# Patient Record
Sex: FEMALE | Race: WHITE | NOT HISPANIC OR LATINO | Employment: FULL TIME | URBAN - METROPOLITAN AREA
[De-identification: names, ages, dates, MRNs, and addresses within clinical notes are randomized per-mention and may not be internally consistent; named-entity substitution may affect disease eponyms.]

---

## 2017-01-16 ENCOUNTER — ALLSCRIPTS OFFICE VISIT (OUTPATIENT)
Dept: OTHER | Facility: OTHER | Age: 52
End: 2017-01-16

## 2017-04-10 ENCOUNTER — ALLSCRIPTS OFFICE VISIT (OUTPATIENT)
Dept: OTHER | Facility: OTHER | Age: 52
End: 2017-04-10

## 2017-04-10 DIAGNOSIS — Z12.31 ENCOUNTER FOR SCREENING MAMMOGRAM FOR MALIGNANT NEOPLASM OF BREAST: ICD-10-CM

## 2017-07-20 ENCOUNTER — ALLSCRIPTS OFFICE VISIT (OUTPATIENT)
Dept: OTHER | Facility: OTHER | Age: 52
End: 2017-07-20

## 2017-10-25 ENCOUNTER — ALLSCRIPTS OFFICE VISIT (OUTPATIENT)
Dept: OTHER | Facility: OTHER | Age: 52
End: 2017-10-25

## 2017-10-25 DIAGNOSIS — Z13.1 ENCOUNTER FOR SCREENING FOR DIABETES MELLITUS: ICD-10-CM

## 2017-10-25 DIAGNOSIS — Z12.31 ENCOUNTER FOR SCREENING MAMMOGRAM FOR MALIGNANT NEOPLASM OF BREAST: ICD-10-CM

## 2017-10-25 DIAGNOSIS — M54.50 LOW BACK PAIN: ICD-10-CM

## 2017-10-25 DIAGNOSIS — Z13.220 ENCOUNTER FOR SCREENING FOR LIPOID DISORDERS: ICD-10-CM

## 2017-10-25 DIAGNOSIS — M19.90 OSTEOARTHRITIS: ICD-10-CM

## 2017-10-27 NOTE — PROGRESS NOTES
Assessment  1  Arthritis, degenerative (715 90) (M19 90)   2  Chronic low back pain (724 2,338 29) (M54 5,G89 29)   3  Screening, lipid (V77 91) (Z13 220)   4  Encounter for screening examination for impaired glucose regulation and diabetes mellitus (V77 1)   (Z13 1)   5  Encounter for screening mammogram for breast cancer (V76 12) (Z12 31)   6  Encounter for screening colonoscopy (V76 51) (Z12 11)    Plan  Arthritis, degenerative, Chronic low back pain, Encounter for screening examination for impaired  glucose regulation and diabetes mellitus, Screening, lipid    · (1) CBC/PLT/DIFF; Status:Active; Requested LIDYA:[id number];    · (1) COMPREHENSIVE METABOLIC PANEL; Status:Active; Requested [id number];   Chronic low back pain    · Hydrocodone-Acetaminophen 7 5-325 MG Oral Tablet; TAKE 1 TABLET EVERY 4 HOURS AS  NEEDED  Encounter for screening colonoscopy    · COLONOSCOPY; Status:Active; Requested OQM:[id number];    · 1 - Philippe SAN, Elidia Wilkerson  (Gastroenterology) Co-Management  *  Status: Active  Requested for:  [id number]  Care Summary provided  : Yes  Encounter for screening mammogram for breast cancer    · * MAMMO SCREENING BILATERAL W CAD; Status:Hold For - Scheduling; Requested for:2017;   PMH: History of screening mammography    · * MAMMO SCREENING BILATERAL W CAD; Status:Active; Requested GCR:[id number];   PMH: Screening for malignant neoplasm of colon    · COLONOSCOPY; Status:Active; Requested CRYSTAL:[id number];   Screening, lipid    · (1) LIPID PANEL, FASTING; Status:Active; Requested IFJ:[id number];     Discussion/Summary  Discussion Summary:   Discussed importance of continued daily activity, continue low fat low cholesterol diet  GO for mammogram  Go for colonoscopy  Patient will consider in future as she is trying to get insurance coverage  Commended on stopping smoking   Discussed low cost lab alternatives including Call [address] [place] for cost adjusted options for those without insurance or local lab [facility] [phone]  Continue with regular PCP follow up  Refill given  Counseling Documentation With Imm: The patient was counseled regarding diagnostic results,-- instructions for management,-- risk factor reductions,-- prognosis,-- patient and family education,-- impressions,-- risks and benefits of treatment options,-- importance of compliance with treatment  Medication SE Review and Pt Understands Tx: Possible side effects of new medications were reviewed with the patient/guardian today  The treatment plan was reviewed with the patient/guardian  The patient/guardian understands and agrees with the treatment plan      Chief Complaint  Chief Complaint Free Text Note Form: PT here for 3 month follow up and medication refills  PT stated no complaints is self pay      History of Present Illness  HPI: 45 yo female notes she stopped smoking  Had been trying to do so for a while  HAs not had labs  Notes she was motivated to stop smoking due to the cosmetics effects of it  No recent imaging  Has not had colonoscopy, pap  Pain stable  Chronic but notes it is livable  NO change in bowel or bladder fxn  Worse with weather changes  Looking for new job, does not have insurance  Chronic Pain (Follow-Up): The patient is being seen for follow-up of chronic back pain  The patient reports no change in the condition  Interval symptoms:  stable back pain,-- denies abdominal pain-- and-- stable lower extremity pain  Tobacco Cessation Pathway: The patient is being seen for Stopped smoking this past week  Doing well  NO complaints at this time  Feels good  and clinic follow-up for tobacco cessation assistance  Review of Systems  Complete-Female:   Constitutional: no fever  ENT: chorinc horase voice per patient unchanged  Cardiovascular: no chest pain-- and-- no palpitations  Respiratory: no shortness of breath-- and-- no cough  Gastrointestinal: No prior colonoscopy  , but-- no abdominal pain,-- no nausea,-- no vomiting-- and-- no diarrhea  Active Problems  1  Advance directive in chart (V49 89) (Z78 9)   2  Arthritis, degenerative (715 90) (M19 90)   3  Chronic low back pain (724 2,338 29) (M54 5,G89 29)   4  Vitamin D deficiency (268 9) (E55 9)    Past Medical History  1  History of Acute suppurative otitis media of right ear without spontaneous rupture of tympanic   membrane, recurrence not specified (382 00) (H66 001)   2  History of Carrier or suspected carrier of other venereal diseases (V02 8) (Z22 4)   3  History of Chronic low back pain (724 2,338 29) (M54 5,G89 29)   4  History of Eustachian tube dysfunction (381 81) (H69 80)   5  History of osteoarthritis (V13 4) (Z87 39)   6  History of Pain in joint of left hip (719 45) (M25 552)   7  History of Right ankle sprain (845 00) (S93 401A)   8  History of Rotator cuff tendinitis (726 10) (M75 80)   9  History of Stress reaction (308 9) (F43 0)   10  History of Viral upper respiratory infection (465 9) (J06 9,B97 89)  Active Problems And Past Medical History Reviewed: The active problems and past medical history were reviewed and updated today  Surgical History  1  History of Dental Surgery   2  History of Tonsillectomy With Adenoidectomy   3  History of Tubal Ligation  Surgical History Reviewed: The surgical history was reviewed and updated today  Family History  Mother    1  Family history of Benign essential hypertension   2  History of stroke  Father    3  Family history of Benign essential hypertension  Grandparent    4  Family history of malignant neoplasm of stomach (V16 0) (Z80 0)  Maternal Grandfather    5  History of cardiac disorder  Family History    6  Family history of Type 2 Diabetes Mellitus  Family History Reviewed: The family history was reviewed and updated today         Social History   · Advance directive in chart (V49 89) (Z78 9)   · Caffeine Use   · Former smoker (V15 82) (Y09 773)   · No illicit drug use   · Rarely consumes alcohol (V49 89) (Z78 9)  Social History Reviewed: The social history was reviewed and updated today  Current Meds   1  Fish Oil 1200 MG Oral Capsule; TAKE AS DIRECTED; Therapy: (Recorded:24Oct2017) to Recorded   2  Hydrocodone-Acetaminophen 7 5-325 MG Oral Tablet; TAKE 1 TABLET EVERY 4 HOURS AS   NEEDED; Last Rx:[id number] Ordered   3  Multivitamins TABS; TAKE 1 TABLET DAILY; Therapy: (SYLWIA:55JTG3423) to Recorded   4  Vitamin B-12 TABS; TAKE 1 TABLET DAILY; Therapy: (JIZJTQWU:57GFJ2985) to Recorded  Medication List Reviewed: The medication list was reviewed and updated today  Allergies  1  Codeine Derivatives  2  No Known Environmental Allergies   3  No Known Food Allergies    Vitals  Vital Signs    Recorded: 13AUT7426 10:48AM   Temperature 98 1 F, Oral   Heart Rate 58   Systolic 203, LUE, Sitting   Diastolic 60, LUE, Sitting   Height 5 ft 5 in   Weight 113 lb 4 oz   BMI Calculated 18 85   BSA Calculated 1 55   O2 Saturation 98, RA     Physical Exam    Constitutional   General appearance: No acute distress, well appearing and well nourished  -- Alert, thing build, seated in room in NAD  Eyes   Conjunctiva and lids: No swelling, erythema or discharge  Ears, Nose, Mouth, and Throat   Oropharynx: Normal with no erythema, edema, exudate or lesions  -- MMM, normal pharynx  Pulmonary   Respiratory effort: No increased work of breathing or signs of respiratory distress  -- CTA throughout  No crackles, no wheeze  Auscultation of lungs: Clear to auscultation  Cardiovascular   Auscultation of heart: Normal rate and rhythm, normal S1 and S2, without murmurs  -- RRR -- No LE edema  Abdomen soft  Musculoskeletal + LS spine paraspinus muscle TTP  Neurologic No gross focal neuro deficits on exam    Psychiatric   Mood and affect: Normal          Health Management  History of Breast cancer screening   Digital Bilateral Screening Mammogram With CAD; every 1 year; Last 30LJI4545;  Next Due:  57AXT4034; Overdue  History of Need for influenza vaccination   Fluzone everyuadrivalent Intramuscular Suspension; every 1 year; Next Due: 98UWY5896; Overdue  History of Screening for depression   PHevery-2 Adult Depression Screening; every 1 year; Last 78IPL7234; Next Due: 74WWH8825; Active  PHevery-9 Adult Depression Screening; every 1 year; Last 14Qsw6466; Next Due: 83SRK1955;   Overdue    Future Appointments    Date/Time Provider Specialty Site   01/25/2018 04:30 PM Shelley Chakraborty MD Internal Medicine [facility]     Signatures   Electronically signed by : Pj Oconnor [facility]; Oct 25 2017 12:06PM EST                       (Author)    Electronically signed by : Isaias Pallas, MD; Oct 26 2017  5:35PM EST

## 2018-01-13 VITALS
OXYGEN SATURATION: 98 % | TEMPERATURE: 98.1 F | HEART RATE: 58 BPM | WEIGHT: 113.25 LBS | SYSTOLIC BLOOD PRESSURE: 108 MMHG | DIASTOLIC BLOOD PRESSURE: 60 MMHG | BODY MASS INDEX: 18.87 KG/M2 | HEIGHT: 65 IN

## 2018-01-14 VITALS
SYSTOLIC BLOOD PRESSURE: 102 MMHG | TEMPERATURE: 98.1 F | HEART RATE: 58 BPM | OXYGEN SATURATION: 98 % | BODY MASS INDEX: 19.16 KG/M2 | DIASTOLIC BLOOD PRESSURE: 58 MMHG | WEIGHT: 115 LBS | HEIGHT: 65 IN

## 2018-01-15 VITALS
TEMPERATURE: 98.4 F | DIASTOLIC BLOOD PRESSURE: 62 MMHG | HEIGHT: 65 IN | OXYGEN SATURATION: 97 % | SYSTOLIC BLOOD PRESSURE: 102 MMHG | WEIGHT: 108 LBS | HEART RATE: 74 BPM | BODY MASS INDEX: 17.99 KG/M2

## 2018-01-15 VITALS
TEMPERATURE: 99.1 F | DIASTOLIC BLOOD PRESSURE: 72 MMHG | HEIGHT: 65 IN | WEIGHT: 108.5 LBS | BODY MASS INDEX: 18.08 KG/M2 | SYSTOLIC BLOOD PRESSURE: 117 MMHG | OXYGEN SATURATION: 99 % | HEART RATE: 55 BPM

## 2018-01-15 NOTE — PROGRESS NOTES
Assessment    1  Viral upper respiratory infection (465 9) (J06 9)    Plan  Viral upper respiratory infection    · Chlorpheniramine Maleate 4 MG Oral Tablet; 1 tablet po q8h prn nasal congestion   · Nasacort Allergy 24HR 55 MCG/ACT Nasal Aerosol; 2 sprays each nostril daily    Discussion/Summary    Patient's symptoms are most consistent with viral upper respiratory infection  I believe the rash is secondary to her coughing rather than a true exanthem  Treatment at this point will be supportive with use of chlorpheniramine and over-the-counter and nasal steroids  Her cough is not severe enough where it wants a cough suppressant  Patient states that Mucinex is sufficient for cough control  She's instructed to contact the office should she develop any fever or worsening symptoms  Chief Complaint    1  Cold Symptoms  Patient is here c/o cough, pain on ears, congestion behind ears, shortness of breath, chills, symptoms are going on for three days, and they are getting worse  History of Present Illness  HPI: The patient presents with a three-day history of sinus congestion, ear congestion, feeling of fullness behind the ears, and generalized malaise associated with some moderate increase in chronic low back pain  She denies any fevers or chills  She's been taking DayQuil and Mucinex with minimal relief of symptoms  She also notes the appearance of a faint erythematous rash in the upper chest        Cold Symptoms:   Associated symptoms include nasal congestion, runny nose, post nasal drainage, scratchy throat, productive cough, plugged ear(s), ear pain and fatigue, but no sore throat, no hoarseness, no dry cough, no facial pressure, no facial pain, no headache, no swollen lymph nodes, no wheezing, no shortness of breath, no weakness, no nausea, no vomiting, no diarrhea, no fever and no chills  Review of Systems    Constitutional: as noted in HPI    ENT: as noted in HPI     Cardiovascular: no complaints of slow or fast heart rate, no chest pain, no palpitations, no leg claudication or lower extremity edema  Respiratory: as noted in HPI  Gastrointestinal: no complaints of abdominal pain, no constipation, no nausea or diarrhea, no vomiting, no bloody stools  Genitourinary: no complaints of dysuria, no incontinence, no pelvic pain, no dysmenorrhea, no vaginal discharge or abnormal vaginal bleeding  Musculoskeletal: myalgias  Integumentary: rash  Active Problems    1  Arthritis, degenerative (715 90) (M19 90)   2  Chronic low back pain (724 2,338 29) (M54 5,G89 29)   3  Vitamin D deficiency (268 9) (E55 9)    Past Medical History  Active Problems And Past Medical History Reviewed: The active problems and past medical history were reviewed and updated today  Social History    · Being A Social Drinker   · Caffeine Use   · Current Every Day Smoker (305 1)   · Former smoker (V15 82) (M05 262)   · Marital History -  (V61 03)   · 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  The social history was reviewed and is unchanged  Current Meds   1  Antioxidant TABS; TAKE 1 TABLET DAILY; Therapy: (Recorded:58Xfe0512) to Recorded   2  Fish Oil 1200 MG Oral Capsule; Therapy: ([place]) to Recorded   3  Hydrocodone-Acetaminophen 7 5-325 MG Oral Tablet; TAKE 1 TABLET EVERY 6 HOURS   AS NEEDED; Last Rx:[id number] Ordered   4  Multivitamins TABS; TAKE 1 TABLET DAILY; Therapy: ([place]) to Recorded   5  Vitamin B-12 TABS; TAKE 1 TABLET DAILY; Therapy: ([place]) to Recorded   6  Vitamin D3 5000 UNIT Oral Tablet; Take 1 tablet daily; Therapy: (Recorded:50Gcf5980) to Recorded    The medication list was reviewed and updated today  Allergies    1   Codeine Derivatives    Vitals   Recorded: 77DZU6810 10:00AM   Temperature 98 7 F, Oral   Heart Rate 76   Systolic 98, LUE, Sitting   Diastolic 70, LUE, Sitting   Height 5 ft 4 5 in Weight 116 lb 4 oz   BMI Calculated 19 65   BSA Calculated 1 56   O2 Saturation 96     Physical Exam    Constitutional   General appearance: No acute distress, well appearing and well nourished  acutely ill, uncomfortable, normal body odor, underweight, clothing appropriate, well groomed, well hydrated and appearance reflects stated age  Eyes   Conjunctiva and lids: No swelling, erythema or discharge  Pupils and irises: Equal, round and reactive to light  Ears, Nose, Mouth, and Throat   External inspection of ears and nose: Normal     Otoscopic examination: Tympanic membranes translucent with normal light reflex  Canals patent without erythema  Nasal mucosa, septum, and turbinates: Normal without edema or erythema  Oropharynx: Normal with no erythema, edema, exudate or lesions  Pulmonary   Respiratory effort: No increased work of breathing or signs of respiratory distress  Auscultation of lungs: Clear to auscultation  Cardiovascular   Auscultation of heart: Normal rate and rhythm, normal S1 and S2, without murmurs  Examination of extremities for edema and/or varicosities: Normal     Musculoskeletal   Gait and station: Normal     Skin A mild erythematous rash consisting of small telangiectatic vessels is noted the mid and upper chest  It is nonpruritic  Neurologic Grossly no focal deficits     Psychiatric   Orientation to person, place, and time: Normal     Mood and affect: Normal          Future Appointments    Date/Time Provider Specialty Site   03/31/2016 04:30 PM Aaliyah Reza MD Internal Medicine [facility]     Signatures   Electronically signed by : Brandon Kyle MD; Lamberto 15 2016 10:31AM EST                       (Author)

## 2018-01-25 ENCOUNTER — OFFICE VISIT (OUTPATIENT)
Dept: INTERNAL MEDICINE CLINIC | Facility: CLINIC | Age: 53
End: 2018-01-25

## 2018-01-25 VITALS
WEIGHT: 111.8 LBS | OXYGEN SATURATION: 98 % | BODY MASS INDEX: 18.63 KG/M2 | HEIGHT: 65 IN | TEMPERATURE: 98.4 F | DIASTOLIC BLOOD PRESSURE: 60 MMHG | SYSTOLIC BLOOD PRESSURE: 108 MMHG | HEART RATE: 64 BPM

## 2018-01-25 DIAGNOSIS — M54.50 CHRONIC MIDLINE LOW BACK PAIN WITHOUT SCIATICA: Primary | ICD-10-CM

## 2018-01-25 DIAGNOSIS — G89.29 CHRONIC MIDLINE LOW BACK PAIN WITHOUT SCIATICA: Primary | ICD-10-CM

## 2018-01-25 PROCEDURE — 99213 OFFICE O/P EST LOW 20 MIN: CPT | Performed by: INTERNAL MEDICINE

## 2018-01-25 RX ORDER — HYDROCODONE BITARTRATE AND ACETAMINOPHEN 7.5; 325 MG/1; MG/1
1 TABLET ORAL EVERY 4 HOURS PRN
COMMUNITY
End: 2018-01-25 | Stop reason: SDUPTHER

## 2018-01-25 RX ORDER — HYDROCODONE BITARTRATE AND ACETAMINOPHEN 7.5; 325 MG/1; MG/1
1 TABLET ORAL EVERY 4 HOURS PRN
Qty: 90 TABLET | Refills: 0 | Status: SHIPPED | OUTPATIENT
Start: 2018-01-25 | End: 2018-02-24

## 2018-01-25 RX ORDER — UBIDECARENONE 75 MG
1 CAPSULE ORAL DAILY
COMMUNITY
End: 2018-01-25 | Stop reason: SDDI

## 2018-01-25 RX ORDER — AMOXICILLIN 500 MG
1200 CAPSULE ORAL DAILY
COMMUNITY
End: 2021-01-25 | Stop reason: ALTCHOICE

## 2018-01-25 RX ORDER — PNV NO.121/IRON/FOLIC ACID 28MG-0.8MG
1 TABLET ORAL DAILY
COMMUNITY
End: 2018-04-26

## 2018-01-25 NOTE — PATIENT INSTRUCTIONS
Pain medication was renewed  The patient is in between jobs at the present time and does not have health insurance but once she is successful in obtaining gainful employment she will proceed with the previously recommended testing

## 2018-01-25 NOTE — PROGRESS NOTES
Assessment/Plan:    No problem-specific Assessment & Plan notes found for this encounter  Problem List Items Addressed This Visit        Other    Chronic low back pain - Primary    Relevant Medications    HYDROcodone-acetaminophen (NORCO) 7 5-325 mg per tablet            Subjective:      Patient ID: Radha Duggan is a 45 y o  female  The patient presents to the office for a follow-up visit relating to chronic low back pain  She relates that she has discontinued smoking  It has been almost 2 weeks since her last cigarette and she is feeling fairly comfortable  Additionally, she notes that her back pain seems to be less since she has stopped smoking  She has not had any significant cravings  She has a little bit concerned after losing 1/4 of an inch in height upon check and in the office today  The following portions of the patient's history were reviewed and updated as appropriate: allergies, current medications, past social history and problem list     Review of Systems   Constitutional: Negative for activity change and appetite change  HENT: Negative  Eyes: Negative  Respiratory: Negative  Cardiovascular: Negative  Gastrointestinal: Negative  Genitourinary: Negative  Musculoskeletal: Positive for back pain (Without sciatica and somewhat improved after smoking cessation)  Neurological: Negative  Psychiatric/Behavioral: Negative  Objective:     Physical Exam   Constitutional: She is oriented to person, place, and time  She appears well-developed and well-nourished  HENT:   Head: Normocephalic and atraumatic  Eyes: Conjunctivae and EOM are normal  Pupils are equal, round, and reactive to light  No scleral icterus  Neck: Normal range of motion  Neck supple  No JVD present  Cardiovascular: Normal rate and regular rhythm  Exam reveals no gallop  No murmur heard  Pulmonary/Chest: Effort normal and breath sounds normal    Abdominal: Soft   She exhibits no distension  Musculoskeletal: Normal range of motion  Neurological: She is alert and oriented to person, place, and time  Skin: Skin is warm and dry  Psychiatric: She has a normal mood and affect

## 2018-03-22 ENCOUNTER — TELEPHONE (OUTPATIENT)
Dept: INTERNAL MEDICINE CLINIC | Facility: CLINIC | Age: 53
End: 2018-03-22

## 2018-03-22 NOTE — TELEPHONE ENCOUNTER
Noted  Will refill when Dr Smith Sanford is in [address] [facility] office tomorrow

## 2018-03-22 NOTE — TELEPHONE ENCOUNTER
Jemima called and stated that she needed her Hydrocodone refilled, the last time she was in she stated that the quantity of the medication was wrong, it is supposed to be a quantity of 120  I sent her to the prescription refill line but I also wanted to document it to make you aware that is what she wants  Thank you

## 2018-03-23 DIAGNOSIS — M54.50 CHRONIC BILATERAL LOW BACK PAIN WITHOUT SCIATICA: Primary | ICD-10-CM

## 2018-03-23 DIAGNOSIS — G89.29 CHRONIC BILATERAL LOW BACK PAIN WITHOUT SCIATICA: Primary | ICD-10-CM

## 2018-03-23 RX ORDER — HYDROCODONE BITARTRATE AND ACETAMINOPHEN 7.5; 325 MG/1; MG/1
1 TABLET ORAL EVERY 4 HOURS PRN
Qty: 120 TABLET | Refills: 0 | Status: SHIPPED | OUTPATIENT
Start: 2018-03-23 | End: 2018-04-26 | Stop reason: SDUPTHER

## 2018-04-26 ENCOUNTER — OFFICE VISIT (OUTPATIENT)
Dept: INTERNAL MEDICINE CLINIC | Facility: CLINIC | Age: 53
End: 2018-04-26

## 2018-04-26 VITALS
WEIGHT: 121.4 LBS | HEIGHT: 65 IN | BODY MASS INDEX: 20.23 KG/M2 | HEART RATE: 61 BPM | TEMPERATURE: 99.1 F | DIASTOLIC BLOOD PRESSURE: 68 MMHG | OXYGEN SATURATION: 97 % | SYSTOLIC BLOOD PRESSURE: 120 MMHG

## 2018-04-26 DIAGNOSIS — G89.29 CHRONIC BILATERAL LOW BACK PAIN WITHOUT SCIATICA: ICD-10-CM

## 2018-04-26 DIAGNOSIS — M47.817 SPONDYLOSIS OF LUMBOSACRAL REGION WITHOUT MYELOPATHY OR RADICULOPATHY: Primary | ICD-10-CM

## 2018-04-26 DIAGNOSIS — M54.50 CHRONIC BILATERAL LOW BACK PAIN WITHOUT SCIATICA: ICD-10-CM

## 2018-04-26 DIAGNOSIS — K59.09 OTHER CONSTIPATION: ICD-10-CM

## 2018-04-26 PROCEDURE — 99212 OFFICE O/P EST SF 10 MIN: CPT | Performed by: INTERNAL MEDICINE

## 2018-04-26 RX ORDER — DIPHENOXYLATE HYDROCHLORIDE AND ATROPINE SULFATE 2.5; .025 MG/1; MG/1
1 TABLET ORAL DAILY
COMMUNITY

## 2018-04-26 RX ORDER — HYDROCODONE BITARTRATE AND ACETAMINOPHEN 7.5; 325 MG/1; MG/1
1 TABLET ORAL EVERY 4 HOURS PRN
Qty: 120 TABLET | Refills: 0 | Status: SHIPPED | OUTPATIENT
Start: 2018-04-26 | End: 2018-06-18 | Stop reason: SDUPTHER

## 2018-04-26 NOTE — PROGRESS NOTES
Assessment/Plan:    Arthritis, degenerative  Improved with the use of probiotics  Most likely due to combination of smoking cessation and use of hydrocodone       Diagnoses and all orders for this visit:    Spondylosis of lumbosacral region without myelopathy or radiculopathy    Chronic bilateral low back pain without sciatica  -     HYDROcodone-acetaminophen (NORCO) 7 5-325 mg per tablet; Take 1 tablet by mouth every 4 (four) hours as needed for pain Earliest Fill Date: 4/26/18 Max Daily Amount: 6 tablets    Other constipation    Other orders  -     multivitamin (THERAGRAN) TABS; Take 1 tablet by mouth daily  -     Probiotic PACK; Take by mouth          Subjective:      Patient ID: Tad Beauchamp is a 45 y o  female  Patient presents for follow-up visit for chronic low back pain  She has managed to stop smoking approximately 2 months ago  In the interim she has gained 10 lb of weight but she states that she feels better  She is a little upset over the fact that she now has a small protuberant belly  She has begun doing some toning exercises  Her back pain is somewhat improved  She finds that on some days she home it is taking her hydrocodone tablet for the 3rd dose in the evening because she does not have severe enough back pain  Appetite remains good  She did develop some constipation after stopping smoking but this improved with the use of probiotics  Family History   Problem Relation Age of Onset    Hypertension Mother      Benign Essential    Stroke Mother     Hypertension Father      Benign Essential    Other Maternal Grandfather      Cardiac disorder    Other Other      Malignant neoplasm of stomach    Diabetes type II Other      Social History     Social History    Marital status:      Spouse name: N/A    Number of children: N/A    Years of education: N/A     Occupational History    Not on file       Social History Main Topics    Smoking status: Former Smoker     Quit date: 1/12/2018    Smokeless tobacco: Never Used    Alcohol use Yes      Comment: Rarely consumes alcohol    Drug use: No    Sexual activity: Not on file     Other Topics Concern    Not on file     Social History Narrative    Advance directive in chart    Caffeine Use         Past Medical History:   Diagnosis Date   Georgina Sheaprd Carrier of disease     Carrier or suspected carrier of other venereal diseases: Hepatitis B (Previous exposure, cannot donate blood)    Chronic low back pain     Eustachian tube dysfunction     Rotator cuff tendinitis        Current Outpatient Prescriptions:     calcium carbonate-vitamin D (OSCAL-D) 500 mg-200 units per tablet, Take 1 tablet by mouth daily, Disp: , Rfl:     HYDROcodone-acetaminophen (NORCO) 7 5-325 mg per tablet, Take 1 tablet by mouth every 4 (four) hours as needed for pain Earliest Fill Date: 4/26/18 Max Daily Amount: 6 tablets, Disp: 120 tablet, Rfl: 0    multivitamin (THERAGRAN) TABS, Take 1 tablet by mouth daily, Disp: , Rfl:     Omega-3 Fatty Acids (FISH OIL) 1200 MG CAPS, Take by mouth daily, Disp: , Rfl:     Probiotic PACK, Take by mouth, Disp: , Rfl:   Allergies   Allergen Reactions    Codeine Nausea Only and Vomiting     Past Surgical History:   Procedure Laterality Date    DENTAL SURGERY      Impacted wisdom teeth    TONSILECTOMY AND ADNOIDECTOMY      TUBAL LIGATION Bilateral          Review of Systems   Constitutional: Positive for unexpected weight change (Beneficial weight gain after smoking cessation)  HENT: Negative  Eyes: Negative  Respiratory: Negative  Cardiovascular: Negative  Gastrointestinal: Negative  Endocrine: Negative  Genitourinary: Negative  Musculoskeletal: Positive for back pain (Chronic low back pain which is mildly improved)  Skin: Negative  Neurological: Negative  Hematological: Negative            Objective:      /68 (BP Location: Left arm, Patient Position: Sitting, Cuff Size: Standard)   Pulse 61 Temp 99 1 °F (37 3 °C) (Oral)   Ht 5' 5" (1 651 m)   Wt 55 1 kg (121 lb 6 4 oz)   SpO2 97%   BMI 20 20 kg/m²          Physical Exam   Constitutional: She is oriented to person, place, and time  She appears well-developed and well-nourished  HENT:   Head: Normocephalic and atraumatic  Eyes: EOM are normal  Pupils are equal, round, and reactive to light  Neck: Neck supple  No JVD present  No tracheal deviation present  Cardiovascular: Normal rate, regular rhythm, normal heart sounds and intact distal pulses  Pulmonary/Chest: Effort normal and breath sounds normal  She has no rales  Abdominal: Soft  She exhibits no distension  There is no tenderness  Musculoskeletal: Normal range of motion  She exhibits no edema or deformity  Lymphadenopathy:     She has no cervical adenopathy  Neurological: She is alert and oriented to person, place, and time  Skin: Skin is warm and dry  Psychiatric: She has a normal mood and affect   Thought content normal

## 2018-04-26 NOTE — ASSESSMENT & PLAN NOTE
Improved with the use of probiotics    Most likely due to combination of smoking cessation and use of hydrocodone

## 2018-04-26 NOTE — PATIENT INSTRUCTIONS
Hydrocodone was renewed  The patient is still not covered by any type of health insurance but hopefully with resumption of gainful employment at [address] [place] is a benefit she will be able to once again have some follow-up labs and needed studies done

## 2018-06-18 DIAGNOSIS — G89.29 CHRONIC BILATERAL LOW BACK PAIN WITHOUT SCIATICA: ICD-10-CM

## 2018-06-18 DIAGNOSIS — M54.50 CHRONIC BILATERAL LOW BACK PAIN WITHOUT SCIATICA: ICD-10-CM

## 2018-06-18 RX ORDER — HYDROCODONE BITARTRATE AND ACETAMINOPHEN 7.5; 325 MG/1; MG/1
1 TABLET ORAL EVERY 4 HOURS PRN
Qty: 120 TABLET | Refills: 0 | Status: SHIPPED | OUTPATIENT
Start: 2018-06-18 | End: 2018-07-26 | Stop reason: SDUPTHER

## 2018-07-26 DIAGNOSIS — M54.50 CHRONIC BILATERAL LOW BACK PAIN WITHOUT SCIATICA: ICD-10-CM

## 2018-07-26 DIAGNOSIS — G89.29 CHRONIC BILATERAL LOW BACK PAIN WITHOUT SCIATICA: ICD-10-CM

## 2018-07-27 RX ORDER — HYDROCODONE BITARTRATE AND ACETAMINOPHEN 7.5; 325 MG/1; MG/1
1 TABLET ORAL EVERY 4 HOURS PRN
Qty: 120 TABLET | Refills: 0 | Status: SHIPPED | OUTPATIENT
Start: 2018-07-27 | End: 2018-09-06 | Stop reason: SDUPTHER

## 2018-08-15 ENCOUNTER — OFFICE VISIT (OUTPATIENT)
Dept: INTERNAL MEDICINE CLINIC | Facility: CLINIC | Age: 53
End: 2018-08-15

## 2018-08-15 VITALS
HEIGHT: 65 IN | OXYGEN SATURATION: 97 % | BODY MASS INDEX: 20.76 KG/M2 | DIASTOLIC BLOOD PRESSURE: 52 MMHG | TEMPERATURE: 97.5 F | WEIGHT: 124.6 LBS | SYSTOLIC BLOOD PRESSURE: 102 MMHG | HEART RATE: 55 BPM

## 2018-08-15 DIAGNOSIS — M47.817 SPONDYLOSIS OF LUMBOSACRAL REGION WITHOUT MYELOPATHY OR RADICULOPATHY: ICD-10-CM

## 2018-08-15 DIAGNOSIS — M54.50 CHRONIC MIDLINE LOW BACK PAIN WITHOUT SCIATICA: Primary | ICD-10-CM

## 2018-08-15 DIAGNOSIS — G89.29 CHRONIC MIDLINE LOW BACK PAIN WITHOUT SCIATICA: Primary | ICD-10-CM

## 2018-08-15 DIAGNOSIS — M70.61 TROCHANTERIC BURSITIS OF RIGHT HIP: ICD-10-CM

## 2018-08-15 PROCEDURE — 99213 OFFICE O/P EST LOW 20 MIN: CPT | Performed by: INTERNAL MEDICINE

## 2018-08-15 NOTE — ASSESSMENT & PLAN NOTE
No change to current medications    We will continue with as needed hydrocodone and the addition of homeopathic treatments as noted

## 2018-08-15 NOTE — ASSESSMENT & PLAN NOTE
Will initiate some homeopathic remedies for the trochanteric bursitis with arnica cream and tablets

## 2018-08-15 NOTE — PATIENT INSTRUCTIONS
The patient did not want to consider any inter trochanteric steroid injections at this time  She will trial with over-the-counter anti-inflammatory medications and homeopathic remedies such as arnica [place]  She anticipates getting a job at [employer] at the end of the month at which time she will have healthcare insurance and hopefully can begin some diagnostics

## 2018-08-15 NOTE — ASSESSMENT & PLAN NOTE
The pain is controlled with current medications  We had a discussion about the addition of Celebrex and/or Cymbalta as an adjunctive therapy  We will defer for now

## 2018-08-15 NOTE — PROGRESS NOTES
Assessment/Plan:    Chronic low back pain  The pain is controlled with current medications  We had a discussion about the addition of Celebrex and/or Cymbalta as an adjunctive therapy  We will defer for now  Trochanteric bursitis of right hip    Will initiate some homeopathic remedies for the trochanteric bursitis with arnica cream and tablets  Arthritis, degenerative    No change to current medications  We will continue with as needed hydrocodone and the addition of homeopathic treatments as noted       Diagnoses and all orders for this visit:    Chronic midline low back pain without sciatica    Spondylosis of lumbosacral region without myelopathy or radiculopathy    Trochanteric bursitis of right hip          Subjective:      Patient ID: Hiral Ren is a 45 y o  female  Patient presents for 3 month follow-up for chronic low back pain  She is currently doing well  She is only requiring to take water to doses of her pain medication per day she has a new problem today which is that of some right hip discomfort  She is concerned that she could have some arthritis in her hip  The pain bothers her somewhat going up and downstairs but for the most part is just precipitated by walking and is aggravated when she tries to sleep on that side  She has had no fevers or chills  Family History   Problem Relation Age of Onset    Hypertension Mother         Benign Essential    Stroke Mother     Hypertension Father         Benign Essential    Other Maternal Grandfather         Cardiac disorder    Other Other         Malignant neoplasm of stomach    Diabetes type II Other      Social History     Social History    Marital status:      Spouse name: N/A    Number of children: N/A    Years of education: N/A     Occupational History    Not on file       Social History Main Topics    Smoking status: Former Smoker     Quit date: 1/12/2018    Smokeless tobacco: Never Used    Alcohol use Yes Comment: Rarely consumes alcohol    Drug use: No    Sexual activity: Not on file     Other Topics Concern    Not on file     Social History Narrative    Advance directive in chart    Caffeine Use         Past Medical History:   Diagnosis Date   [facility] Carrier of disease     Carrier or suspected carrier of other venereal diseases: Hepatitis B (Previous exposure, cannot donate blood)    Chronic low back pain     Eustachian tube dysfunction     Rotator cuff tendinitis        Current Outpatient Prescriptions:     calcium carbonate-vitamin D (OSCAL-D) 500 mg-200 units per tablet, Take 1 tablet by mouth daily, Disp: , Rfl:     HYDROcodone-acetaminophen (NORCO) 7 5-325 mg per tablet, Take 1 tablet by mouth every 4 (four) hours as needed for pain Max Daily Amount: 6 tablets, Disp: 120 tablet, Rfl: 0    multivitamin (THERAGRAN) TABS, Take 1 tablet by mouth daily, Disp: , Rfl:     Probiotic PACK, Take by mouth, Disp: , Rfl:     Omega-3 Fatty Acids (FISH OIL) 1200 MG CAPS, Take by mouth daily, Disp: , Rfl:   Allergies   Allergen Reactions    Codeine Nausea Only and Vomiting     Past Surgical History:   Procedure Laterality Date    DENTAL SURGERY      Impacted wisdom teeth    TONSILECTOMY AND ADNOIDECTOMY      TUBAL LIGATION Bilateral          Review of Systems   Constitutional: Negative  HENT: Negative  Eyes: Negative  Respiratory: Negative  Cardiovascular: Negative  Gastrointestinal: Negative  Endocrine: Negative  Genitourinary: Negative  Musculoskeletal: Positive for arthralgias (Right hip discomfort)  Neurological: Negative  Hematological: Negative  Objective:      /52 (BP Location: Left arm, Patient Position: Sitting, Cuff Size: Adult)   Pulse 55   Temp 97 5 °F (36 4 °C) (Oral)   Ht 5' 4 76" (1 645 m)   Wt 56 5 kg (124 lb 9 6 oz)   SpO2 97%   BMI 20 89 kg/m²          Physical Exam   Constitutional: She is oriented to person, place, and time   She appears well-developed and well-nourished  HENT:   Head: Normocephalic and atraumatic  Eyes: Conjunctivae are normal  Pupils are equal, round, and reactive to light  Neck: No JVD present  No tracheal deviation present  No thyromegaly present  Cardiovascular: Normal rate and regular rhythm  Pulmonary/Chest: Effort normal  No respiratory distress  Abdominal: Soft  She exhibits no distension  Musculoskeletal: Normal range of motion  She exhibits tenderness (There is tenderness of the right trochanteric bursa)  She exhibits no deformity  Neurological: She is alert and oriented to person, place, and time  Grossly, no focal neurological deficits  Skin: Skin is warm and dry  Psychiatric: She has a normal mood and affect  Her behavior is normal    Vitals reviewed

## 2018-09-06 DIAGNOSIS — G89.29 CHRONIC BILATERAL LOW BACK PAIN WITHOUT SCIATICA: ICD-10-CM

## 2018-09-06 DIAGNOSIS — M54.50 CHRONIC BILATERAL LOW BACK PAIN WITHOUT SCIATICA: ICD-10-CM

## 2018-09-07 RX ORDER — HYDROCODONE BITARTRATE AND ACETAMINOPHEN 7.5; 325 MG/1; MG/1
1 TABLET ORAL EVERY 4 HOURS PRN
Qty: 120 TABLET | Refills: 0 | Status: SHIPPED | OUTPATIENT
Start: 2018-09-07 | End: 2018-10-15 | Stop reason: SDUPTHER

## 2018-10-15 DIAGNOSIS — M54.50 CHRONIC BILATERAL LOW BACK PAIN WITHOUT SCIATICA: ICD-10-CM

## 2018-10-15 DIAGNOSIS — G89.29 CHRONIC BILATERAL LOW BACK PAIN WITHOUT SCIATICA: ICD-10-CM

## 2018-10-15 RX ORDER — HYDROCODONE BITARTRATE AND ACETAMINOPHEN 7.5; 325 MG/1; MG/1
1 TABLET ORAL EVERY 4 HOURS PRN
Qty: 120 TABLET | Refills: 0 | Status: SHIPPED | OUTPATIENT
Start: 2018-10-15 | End: 2018-11-15 | Stop reason: SDUPTHER

## 2018-10-18 ENCOUNTER — TELEPHONE (OUTPATIENT)
Dept: INTERNAL MEDICINE CLINIC | Facility: CLINIC | Age: 53
End: 2018-10-18

## 2018-10-18 NOTE — TELEPHONE ENCOUNTER
[facility] prior authorization started for Hydrocodone-Acetaminophen 7 5-325 mg - Take 1 tab q4h prn, [phone]  Prior auth completed over-the-phone since requested fax form was never received by our office      [facility] prior auth approved, PA# [id number], Eff 9/18/18 - 10/18/18

## 2018-10-22 ENCOUNTER — OFFICE VISIT (OUTPATIENT)
Dept: INTERNAL MEDICINE CLINIC | Facility: CLINIC | Age: 53
End: 2018-10-22
Payer: COMMERCIAL

## 2018-10-22 VITALS
SYSTOLIC BLOOD PRESSURE: 100 MMHG | OXYGEN SATURATION: 98 % | WEIGHT: 122 LBS | HEIGHT: 65 IN | HEART RATE: 47 BPM | BODY MASS INDEX: 20.33 KG/M2 | TEMPERATURE: 97.7 F | DIASTOLIC BLOOD PRESSURE: 60 MMHG

## 2018-10-22 DIAGNOSIS — M54.50 CHRONIC MIDLINE LOW BACK PAIN WITHOUT SCIATICA: Primary | ICD-10-CM

## 2018-10-22 DIAGNOSIS — E55.9 VITAMIN D DEFICIENCY: ICD-10-CM

## 2018-10-22 DIAGNOSIS — G89.29 CHRONIC MIDLINE LOW BACK PAIN WITHOUT SCIATICA: Primary | ICD-10-CM

## 2018-10-22 PROCEDURE — 99213 OFFICE O/P EST LOW 20 MIN: CPT | Performed by: INTERNAL MEDICINE

## 2018-10-23 NOTE — PROGRESS NOTES
Assessment/Plan:    Chronic low back pain  Will schedule x-rays of the lumbar spine and consider following this with EMG studies  Vitamin D deficiency  Continue vitamin-D supplementation       Diagnoses and all orders for this visit:    Chronic midline low back pain without sciatica  -     CBC and differential  -     Comprehensive metabolic panel  -     C-reactive protein  -     Sedimentation rate, automated  -     Lipid panel; Future  -     XR spine lumbar minimum 4 views non injury; Future    Vitamin D deficiency          Subjective:      Patient ID: Tawny Bustillo is a 45 y o  female  Patient presents to the office for follow-up visit for chronic back pain  She also has a history of vitamin-D deficiency  With her new job she now has medical insurance and cannot afford to have some evaluations done in reference to her low back pain  She does not have any sciatic radiation at this time  She has not had any imaging done for her chronic back pain  She did briefly attend physical therapy but only for a few sessions several years ago  She has been maintained on hydrocodone for back pain and would like to find an alternative  Family History   Problem Relation Age of Onset    Hypertension Mother         Benign Essential    Stroke Mother     Hypertension Father         Benign Essential    Other Maternal Grandfather         Cardiac disorder    Other Other         Malignant neoplasm of stomach    Diabetes type II Other      Social History     Social History    Marital status:      Spouse name: N/A    Number of children: N/A    Years of education: N/A     Occupational History    Not on file       Social History Main Topics    Smoking status: Former Smoker     Quit date: 1/12/2018    Smokeless tobacco: Never Used    Alcohol use Yes      Comment: Rarely consumes alcohol    Drug use: No    Sexual activity: Not on file     Other Topics Concern    Not on file     Social History Narrative Advance directive in chart    Caffeine Use         Past Medical History:   Diagnosis Date   [facility] Carrier of disease     Carrier or suspected carrier of other venereal diseases: Hepatitis B (Previous exposure, cannot donate blood)    Chronic low back pain     Eustachian tube dysfunction     Rotator cuff tendinitis        Current Outpatient Prescriptions:     calcium carbonate-vitamin D (OSCAL-D) 500 mg-200 units per tablet, Take 1 tablet by mouth daily, Disp: , Rfl:     HYDROcodone-acetaminophen (NORCO) 7 5-325 mg per tablet, Take 1 tablet by mouth every 4 (four) hours as needed for pain Max Daily Amount: 6 tablets, Disp: 120 tablet, Rfl: 0    multivitamin (THERAGRAN) TABS, Take 1 tablet by mouth daily, Disp: , Rfl:     Omega-3 Fatty Acids (FISH OIL) 1200 MG CAPS, Take by mouth daily, Disp: , Rfl:     Probiotic PACK, Take by mouth, Disp: , Rfl:   Allergies   Allergen Reactions    Codeine Nausea Only and Vomiting     Past Surgical History:   Procedure Laterality Date    DENTAL SURGERY      Impacted wisdom teeth    TONSILECTOMY AND ADNOIDECTOMY      TUBAL LIGATION Bilateral        Review of Systems   Constitutional: Negative  HENT: Negative  Eyes: Negative  Respiratory: Negative  Cardiovascular: Negative  Gastrointestinal: Negative  Endocrine: Negative  Genitourinary: Negative  Musculoskeletal: Positive for back pain (Chronic low back pain)  Skin: Negative  Allergic/Immunologic: Negative  Neurological: Negative  Hematological: Negative  Psychiatric/Behavioral: Negative  Objective:      /60 (BP Location: Left arm, Patient Position: Sitting, Cuff Size: Adult)   Pulse (!) 47   Temp 97 7 °F (36 5 °C) (Oral)   Ht 5' 4 76" (1 645 m)   Wt 55 3 kg (122 lb)   SpO2 98%   BMI 20 45 kg/m²          Physical Exam   Constitutional: She is oriented to person, place, and time  She appears well-developed and well-nourished  No distress     HENT:   Head: Normocephalic and atraumatic  Eyes: Conjunctivae are normal  No scleral icterus  Neck: Neck supple  No JVD present  No tracheal deviation present  Cardiovascular: Normal rate, regular rhythm and normal heart sounds  Pulmonary/Chest: Effort normal  No respiratory distress  Abdominal: She exhibits no distension  Musculoskeletal: Normal range of motion  She exhibits no edema or deformity  Lymphadenopathy:     She has no cervical adenopathy  Neurological: She is alert and oriented to person, place, and time  Skin: Skin is warm and dry  She is not diaphoretic  Psychiatric: She has a normal mood and affect  Thought content normal    Vitals reviewed

## 2018-10-25 ENCOUNTER — TELEPHONE (OUTPATIENT)
Dept: INTERNAL MEDICINE CLINIC | Facility: CLINIC | Age: 53
End: 2018-10-25

## 2018-10-25 NOTE — TELEPHONE ENCOUNTER
Pt called requesting that the prior auth for Hydrocodone-Acetaminophen 7 5-325 mg be back dated  She indicated that her insurance coverage began on 9/1/18, but she had to pay out-of-pocket for her Rx on 9/7/18  Current prior auth eff date is 9/18/18 - 10/18/18

## 2018-10-25 NOTE — TELEPHONE ENCOUNTER
Called into [facility], [phone] to try and get this resolved for the pt  They indicated that they can't backdate the prior auth b/c there was no rejected claim at the pharmacy when Rx was filled  Called pt and notified her  She said she was going to call [employer] and see what can be done  She indicated that she paid $84 00 for the Rx and was told she could be reimbursed       Case ID # [id number]

## 2018-11-05 ENCOUNTER — TRANSCRIBE ORDERS (OUTPATIENT)
Dept: RADIOLOGY | Facility: HOSPITAL | Age: 53
End: 2018-11-05

## 2018-11-05 ENCOUNTER — ANNUAL EXAM (OUTPATIENT)
Dept: OBGYN CLINIC | Facility: CLINIC | Age: 53
End: 2018-11-05
Payer: COMMERCIAL

## 2018-11-05 ENCOUNTER — HOSPITAL ENCOUNTER (OUTPATIENT)
Dept: RADIOLOGY | Facility: HOSPITAL | Age: 53
Discharge: HOME | End: 2018-11-05
Attending: INTERNAL MEDICINE
Payer: COMMERCIAL

## 2018-11-05 VITALS
SYSTOLIC BLOOD PRESSURE: 124 MMHG | BODY MASS INDEX: 21.07 KG/M2 | HEIGHT: 64 IN | WEIGHT: 123.4 LBS | DIASTOLIC BLOOD PRESSURE: 72 MMHG

## 2018-11-05 DIAGNOSIS — G89.29 CHRONIC MIDLINE LOW BACK PAIN WITHOUT SCIATICA: ICD-10-CM

## 2018-11-05 DIAGNOSIS — Z12.39 BREAST CANCER SCREENING: ICD-10-CM

## 2018-11-05 DIAGNOSIS — Z01.419 ENCOUNTER FOR ANNUAL ROUTINE GYNECOLOGICAL EXAMINATION: Primary | ICD-10-CM

## 2018-11-05 DIAGNOSIS — M54.50 CHRONIC MIDLINE LOW BACK PAIN WITHOUT SCIATICA: ICD-10-CM

## 2018-11-05 PROCEDURE — 72110 X-RAY EXAM L-2 SPINE 4/>VWS: CPT

## 2018-11-05 PROCEDURE — S0610 ANNUAL GYNECOLOGICAL EXAMINA: HCPCS | Performed by: OBSTETRICS & GYNECOLOGY

## 2018-11-05 NOTE — PROGRESS NOTES
Assessment/Plan:    Pap smear done as well as annual   Encouraged self-breast examination as well as calcium supplementation  Recommend baseline mammogram   Also recommending baseline screening colonoscopy  Reviewed menopausal symptoms  She will continue to follow-up with her primary care physician as scheduled  Return to office in 1 year  No problem-specific Assessment & Plan notes found for this encounter  Diagnoses and all orders for this visit:    Encounter for annual routine gynecological examination    Breast cancer screening  -     Mammo screening bilateral w cad; Future          Subjective:      Patient ID: Maksim Felix is a 45 y o  female  HPI     This is a 49-year-old female  ( x2) presents as a new patient for annual well gyn exam   Her last gyn exam was approximately 8 years ago  Patient went through menopause at age 44  She has never been on hormone replacement therapy  Patient has been  for 17 years  She has last been sexually active approximately 3 years ago  She does recall having an abnormal Pap smear many years prior  She denies any vaginal bleeding or spotting  No changes in bowel or bladder function  She denies any hot flashes or night sweats  She does follow up with her primary care physician for chronic back pain every 3 months  She has never had a screening mammogram or colonoscopy  She did have routine labs done today  The following portions of the patient's history were reviewed and updated as appropriate: allergies, current medications, past family history, past medical history, past social history, past surgical history and problem list     Review of Systems   Constitutional: Negative for fatigue, fever and unexpected weight change  Respiratory: Negative for cough, chest tightness, shortness of breath and wheezing  Cardiovascular: Negative  Negative for chest pain and palpitations  Gastrointestinal: Negative    Negative for abdominal distention, abdominal pain, blood in stool, constipation, diarrhea, nausea and vomiting  Genitourinary: Negative  Negative for difficulty urinating, dyspareunia, dysuria, flank pain, frequency, genital sores, hematuria, pelvic pain, urgency, vaginal bleeding, vaginal discharge and vaginal pain  Skin: Negative for rash  Objective:      /72   Ht 5' 4" (1 626 m)   Wt 56 kg (123 lb 6 4 oz)   Breastfeeding? No   BMI 21 18 kg/m²          Physical Exam   Constitutional: She appears well-developed and well-nourished  Cardiovascular: Normal rate and regular rhythm  Pulmonary/Chest: Effort normal and breath sounds normal  Right breast exhibits no inverted nipple, no mass, no nipple discharge, no skin change and no tenderness  Left breast exhibits no inverted nipple, no mass, no nipple discharge, no skin change and no tenderness  Abdominal: Soft  Bowel sounds are normal  She exhibits no distension  There is no tenderness  There is no rebound and no guarding  Genitourinary: Rectum normal, vagina normal and uterus normal  There is no lesion on the right labia  There is no lesion on the left labia  Cervix exhibits no discharge and no friability  Right adnexum displays no mass, no tenderness and no fullness  Left adnexum displays no mass, no tenderness and no fullness  No vaginal discharge found  Genitourinary Comments: The vagina is evident of estrogen deficiency  There is no evidence of pelvic floor prolapse  Rectovaginal exam is confirmatory

## 2018-11-06 LAB
ALBUMIN SERPL-MCNC: 4.4 G/DL (ref 3.6–5.1)
ALBUMIN/GLOB SERPL: 2.3 (CALC) (ref 1–2.5)
ALP SERPL-CCNC: 48 U/L (ref 33–130)
ALT SERPL-CCNC: 9 U/L (ref 6–29)
AST SERPL-CCNC: 12 U/L (ref 10–35)
BASOPHILS # BLD AUTO: 50 CELLS/UL (ref 0–200)
BASOPHILS NFR BLD AUTO: 0.9 %
BILIRUB SERPL-MCNC: 0.5 MG/DL (ref 0.2–1.2)
BUN SERPL-MCNC: 21 MG/DL (ref 7–25)
BUN/CREAT SERPL: NORMAL (CALC) (ref 6–22)
CALCIUM SERPL-MCNC: 9.3 MG/DL (ref 8.6–10.4)
CHLORIDE SERPL-SCNC: 108 MMOL/L (ref 98–110)
CHOLEST SERPL-MCNC: 191 MG/DL
CHOLEST/HDLC SERPL: 3.2 (CALC)
CO2 SERPL-SCNC: 27 MMOL/L (ref 20–32)
CREAT SERPL-MCNC: 0.81 MG/DL (ref 0.5–1.05)
CRP SERPL-MCNC: 1.1 MG/L
EOSINOPHIL # BLD AUTO: 101 CELLS/UL (ref 15–500)
EOSINOPHIL NFR BLD AUTO: 1.8 %
ERYTHROCYTE [DISTWIDTH] IN BLOOD BY AUTOMATED COUNT: 12.5 % (ref 11–15)
ERYTHROCYTE [SEDIMENTATION RATE] IN BLOOD BY WESTERGREN METHOD: 6 MM/H
GLOBULIN SER CALC-MCNC: 1.9 G/DL (CALC) (ref 1.9–3.7)
GLUCOSE SERPL-MCNC: 88 MG/DL (ref 65–99)
HCT VFR BLD AUTO: 44 % (ref 35–45)
HDLC SERPL-MCNC: 60 MG/DL
HGB BLD-MCNC: 14.7 G/DL (ref 11.7–15.5)
LDLC SERPL CALC-MCNC: 115 MG/DL (CALC)
LYMPHOCYTES # BLD AUTO: 1848 CELLS/UL (ref 850–3900)
LYMPHOCYTES NFR BLD AUTO: 33 %
MCH RBC QN AUTO: 31.7 PG (ref 27–33)
MCHC RBC AUTO-ENTMCNC: 33.4 G/DL (ref 32–36)
MCV RBC AUTO: 95 FL (ref 80–100)
MONOCYTES # BLD AUTO: 482 CELLS/UL (ref 200–950)
MONOCYTES NFR BLD AUTO: 8.6 %
NEUTROPHILS # BLD AUTO: 3119 CELLS/UL (ref 1500–7800)
NEUTROPHILS NFR BLD AUTO: 55.7 %
NONHDLC SERPL-MCNC: 131 MG/DL (CALC)
PLATELET # BLD AUTO: 229 THOUSAND/UL (ref 140–400)
PMV BLD REES-ECKER: 10.1 FL (ref 7.5–12.5)
POTASSIUM SERPL-SCNC: 4.4 MMOL/L (ref 3.5–5.3)
PROT SERPL-MCNC: 6.3 G/DL (ref 6.1–8.1)
RBC # BLD AUTO: 4.63 MILLION/UL (ref 3.8–5.1)
SL AMB EGFR AFRICAN AMERICAN: 95 ML/MIN/1.73M2
SL AMB EGFR NON AFRICAN AMERICAN: 82 ML/MIN/1.73M2
SODIUM SERPL-SCNC: 142 MMOL/L (ref 135–146)
TRIGL SERPL-MCNC: 72 MG/DL
WBC # BLD AUTO: 5.6 THOUSAND/UL (ref 3.8–10.8)

## 2018-11-09 LAB
CLINICAL INFO: NORMAL
CYTOLOGY CMNT CVX/VAG CYTO-IMP: NORMAL
DATE PREVIOUS BX: NORMAL
HPV E6+E7 MRNA CVX QL NAA+PROBE: NOT DETECTED
LMP START DATE: NORMAL
SL AMB PREV. PAP:: NORMAL
SPECIMEN SOURCE CVX/VAG CYTO: NORMAL

## 2018-11-15 ENCOUNTER — OFFICE VISIT (OUTPATIENT)
Dept: INTERNAL MEDICINE CLINIC | Facility: CLINIC | Age: 53
End: 2018-11-15
Payer: COMMERCIAL

## 2018-11-15 VITALS
DIASTOLIC BLOOD PRESSURE: 60 MMHG | BODY MASS INDEX: 20.03 KG/M2 | OXYGEN SATURATION: 93 % | SYSTOLIC BLOOD PRESSURE: 102 MMHG | HEART RATE: 57 BPM | TEMPERATURE: 98.3 F | HEIGHT: 65 IN | WEIGHT: 120.2 LBS

## 2018-11-15 DIAGNOSIS — J01.00 ACUTE NON-RECURRENT MAXILLARY SINUSITIS: Primary | ICD-10-CM

## 2018-11-15 DIAGNOSIS — G89.29 CHRONIC BILATERAL LOW BACK PAIN WITHOUT SCIATICA: ICD-10-CM

## 2018-11-15 DIAGNOSIS — M54.50 CHRONIC BILATERAL LOW BACK PAIN WITHOUT SCIATICA: ICD-10-CM

## 2018-11-15 PROCEDURE — 1036F TOBACCO NON-USER: CPT | Performed by: NURSE PRACTITIONER

## 2018-11-15 PROCEDURE — 99213 OFFICE O/P EST LOW 20 MIN: CPT | Performed by: NURSE PRACTITIONER

## 2018-11-15 PROCEDURE — 3008F BODY MASS INDEX DOCD: CPT | Performed by: NURSE PRACTITIONER

## 2018-11-15 RX ORDER — HYDROCODONE BITARTRATE AND ACETAMINOPHEN 7.5; 325 MG/1; MG/1
1 TABLET ORAL EVERY 4 HOURS PRN
Qty: 120 TABLET | Refills: 0 | Status: SHIPPED | OUTPATIENT
Start: 2018-11-15 | End: 2018-12-20 | Stop reason: SDUPTHER

## 2018-11-15 RX ORDER — FLUTICASONE PROPIONATE 50 MCG
1 SPRAY, SUSPENSION (ML) NASAL DAILY
Qty: 16 G | Refills: 0 | Status: SHIPPED | OUTPATIENT
Start: 2018-11-15 | End: 2019-06-10 | Stop reason: ALTCHOICE

## 2018-11-15 RX ORDER — AMOXICILLIN AND CLAVULANATE POTASSIUM 875; 125 MG/1; MG/1
1 TABLET, FILM COATED ORAL EVERY 12 HOURS SCHEDULED
Qty: 14 TABLET | Refills: 0 | Status: SHIPPED | OUTPATIENT
Start: 2018-11-15 | End: 2018-11-22

## 2018-11-15 NOTE — LETTER
November 15, 2018     Radha Duggan  [address]    Patient: Radha Duggan   YOB: 1965   Date of Visit: 11/15/2018       To Whom It May Concern,    Please excuse Tang Palaciso from work on 11/15/2018  She is currently under our professional care  If you have any questions please do not hesitate to call the office  Thank you!         [id number] [facility]       CC: No Recipients

## 2018-11-15 NOTE — PROGRESS NOTES
Assessment/Plan:     Diagnoses and all orders for this visit:    Acute non-recurrent maxillary sinusitis  -     amoxicillin-clavulanate (AUGMENTIN) 875-125 mg per tablet; Take 1 tablet by mouth every 12 (twelve) hours for 7 days  -     fluticasone (FLONASE) 50 mcg/act nasal spray; 1 spray into each nostril daily  -     Can continue OTC cold medication for symptom relief  -     Notify office if symptoms fail to improve in 3-5 days  Subjective:      Patient ID: Konrad Garza is a 45 y o  female  HPI    Patient is here today complaining of cold symptoms  Symptom onset was 3 weeks ago  Symptoms include right ear pain, right teeth pain, sinus pressure, feverish feeling and chills  Symptoms have been waxing and waning  OTC Tylenol cold/sinus and Mucinex D with no relief  The following portions of the patient's history were reviewed and updated as appropriate: allergies, current medications, past family history, past medical history, past social history, past surgical history and problem list     Review of Systems   Constitutional: Positive for activity change (run down), chills, diaphoresis, fatigue and fever  Negative for appetite change  HENT: Positive for congestion, ear pain (right side), postnasal drip (intermittent), rhinorrhea, sinus pain, sinus pressure and tinnitus (resolved)  Negative for sore throat  Eyes: Positive for discharge (watery)  Negative for redness and itching  Respiratory: Negative for cough, chest tightness, shortness of breath and wheezing  Neurological: Positive for dizziness (resolved) and headaches           Past Medical History:   Diagnosis Date    Carrier of disease     Carrier or suspected carrier of other venereal diseases: Hepatitis B (Previous exposure, cannot donate blood)    Chronic low back pain     Eustachian tube dysfunction     Rotator cuff tendinitis          Current Outpatient Prescriptions:     calcium carbonate-vitamin D (OSCAL-D) 500 mg-200 units per tablet, Take 1 tablet by mouth daily, Disp: , Rfl:     HYDROcodone-acetaminophen (NORCO) 7 5-325 mg per tablet, Take 1 tablet by mouth every 4 (four) hours as needed for pain Max Daily Amount: 6 tablets, Disp: 120 tablet, Rfl: 0    multivitamin (THERAGRAN) TABS, Take 1 tablet by mouth daily, Disp: , Rfl:     Omega-3 Fatty Acids (FISH OIL) 1200 MG CAPS, Take 1,200 mg by mouth daily  , Disp: , Rfl:     Probiotic CAPS, Take 1 capsule by mouth daily  , Disp: , Rfl:     Allergies   Allergen Reactions    Codeine Nausea Only and Vomiting       Social History   Past Surgical History:   Procedure Laterality Date    DENTAL SURGERY      Impacted wisdom teeth    TONSILECTOMY AND ADNOIDECTOMY      TUBAL LIGATION Bilateral      Family History   Problem Relation Age of Onset    Hypertension Mother         Benign Essential    Stroke Mother     Hypertension Father         Benign Essential    Other Maternal Grandfather         Cardiac disorder    Other Other         Malignant neoplasm of stomach    Diabetes type II Other        Objective:  /60 (BP Location: Left arm, Patient Position: Sitting, Cuff Size: Standard)   Pulse 57   Temp 98 3 °F (36 8 °C) (Oral)   Ht 5' 4 76" (1 645 m)   Wt 54 5 kg (120 lb 3 2 oz)   SpO2 93%   BMI 20 15 kg/m²      Physical Exam   Constitutional: She is oriented to person, place, and time  She appears well-developed and well-nourished  No distress  HENT:   Head: Normocephalic and atraumatic  Right Ear: External ear normal  A middle ear effusion is present  Left Ear: Tympanic membrane and external ear normal    Nose: Rhinorrhea present  No mucosal edema  Right sinus exhibits no maxillary sinus tenderness and no frontal sinus tenderness  Left sinus exhibits maxillary sinus tenderness  Left sinus exhibits no frontal sinus tenderness  Mouth/Throat: Uvula is midline, oropharynx is clear and moist and mucous membranes are normal  Normal dentition   No oropharyngeal exudate, posterior oropharyngeal edema or posterior oropharyngeal erythema  Raspy voice   Eyes: Pupils are equal, round, and reactive to light  Conjunctivae and EOM are normal    Neck: Normal range of motion  Neck supple  Cardiovascular: Normal rate, regular rhythm and normal heart sounds  No murmur heard  Pulmonary/Chest: Effort normal and breath sounds normal  No respiratory distress  She has no decreased breath sounds  She has no wheezes  She has no rhonchi  Musculoskeletal: She exhibits no edema  Lymphadenopathy:     She has no cervical adenopathy  Neurological: She is alert and oriented to person, place, and time  Skin: Skin is warm and dry  She is not diaphoretic  Psychiatric: She has a normal mood and affect  Her behavior is normal    Vitals reviewed

## 2018-11-15 NOTE — PATIENT INSTRUCTIONS
Start antibiotic and take as directed  Start floanse 2 spray each nostril daily x10 days then 1 spray each nostril daily until symptoms resolve  Can continue over the counter cold medications  Follow up if not improving in 3-5 days

## 2018-12-20 DIAGNOSIS — M54.50 CHRONIC BILATERAL LOW BACK PAIN WITHOUT SCIATICA: ICD-10-CM

## 2018-12-20 DIAGNOSIS — G89.29 CHRONIC BILATERAL LOW BACK PAIN WITHOUT SCIATICA: ICD-10-CM

## 2018-12-20 RX ORDER — HYDROCODONE BITARTRATE AND ACETAMINOPHEN 7.5; 325 MG/1; MG/1
1 TABLET ORAL EVERY 4 HOURS PRN
Qty: 120 TABLET | Refills: 0 | Status: SHIPPED | OUTPATIENT
Start: 2018-12-20 | End: 2019-01-14 | Stop reason: SDUPTHER

## 2019-01-07 ENCOUNTER — OFFICE VISIT (OUTPATIENT)
Dept: INTERNAL MEDICINE CLINIC | Facility: CLINIC | Age: 54
End: 2019-01-07
Payer: COMMERCIAL

## 2019-01-07 VITALS
DIASTOLIC BLOOD PRESSURE: 70 MMHG | HEART RATE: 68 BPM | OXYGEN SATURATION: 98 % | SYSTOLIC BLOOD PRESSURE: 118 MMHG | TEMPERATURE: 98.7 F

## 2019-01-07 DIAGNOSIS — R68.89 FLU-LIKE SYMPTOMS: Primary | ICD-10-CM

## 2019-01-07 LAB
SL AMB POCT RAPID FLU A: NORMAL
SL AMB POCT RAPID FLU B: NORMAL

## 2019-01-07 PROCEDURE — 87804 INFLUENZA ASSAY W/OPTIC: CPT | Performed by: NURSE PRACTITIONER

## 2019-01-07 PROCEDURE — 1036F TOBACCO NON-USER: CPT | Performed by: NURSE PRACTITIONER

## 2019-01-07 PROCEDURE — 99213 OFFICE O/P EST LOW 20 MIN: CPT | Performed by: NURSE PRACTITIONER

## 2019-01-07 NOTE — LETTER
January 7, 2019     Patient: Nesha Caicedo   YOB: 1965   Date of Visit: 1/7/2019       To Whom it May Concern:    Carlos Selby is under my professional care  She was seen in my office on 1/7/2019  She may return to work on 1/9/19  If you have any questions or concerns, please don't hesitate to call           Sincerely,          SASCHA Meza        CC: No Recipients

## 2019-01-07 NOTE — PATIENT INSTRUCTIONS
Influenza is negative  Appears viral  Would recommend mucinex OTC  Stay hydrated  Rest  Return for new/worsenign symtpoms

## 2019-01-07 NOTE — PROGRESS NOTES
Assessment/Plan:    Influenza is negative  Appears viral  Would recommend mucinex DM OTC  Stay hydrated  Rest  Return for new/worsenign symtpoms     Diagnoses and all orders for this visit:    Flu-like symptoms  -     POCT rapid flu A and B        Subjective:      Patient ID: Bernardo Vickers is a 52 y o  female  URI    This is a new problem  The current episode started yesterday  There has been no fever  Associated symptoms include coughing (dry), ear pain, headaches, a plugged ear sensation, rhinorrhea, a sore throat and swollen glands  Pertinent negatives include no chest pain, congestion, diarrhea, nausea, rash, sinus pain, vomiting or wheezing  Treatments tried: dayquil and motrin  The treatment provided no relief  The following portions of the patient's history were reviewed and updated as appropriate: allergies, current medications, past family history, past medical history, past social history, past surgical history and problem list     Review of Systems   Constitutional: Positive for chills and fatigue  Negative for appetite change, fever and unexpected weight change  HENT: Positive for ear pain, postnasal drip, rhinorrhea and sore throat  Negative for congestion, sinus pain and sinus pressure  Respiratory: Positive for cough (dry)  Negative for chest tightness, shortness of breath and wheezing  Cardiovascular: Negative for chest pain and palpitations  Gastrointestinal: Negative for constipation, diarrhea, nausea and vomiting  Musculoskeletal: Positive for myalgias  Skin: Negative for rash  Neurological: Positive for headaches  Negative for dizziness and light-headedness           Past Medical History:   Diagnosis Date    Carrier of disease     Carrier or suspected carrier of other venereal diseases: Hepatitis B (Previous exposure, cannot donate blood)    Chronic low back pain     Eustachian tube dysfunction     Rotator cuff tendinitis          Current Outpatient Prescriptions:    calcium carbonate-vitamin D (OSCAL-D) 500 mg-200 units per tablet, Take 1 tablet by mouth daily, Disp: , Rfl:     fluticasone (FLONASE) 50 mcg/act nasal spray, 1 spray into each nostril daily, Disp: 16 g, Rfl: 0    HYDROcodone-acetaminophen (NORCO) 7 5-325 mg per tablet, Take 1 tablet by mouth every 4 (four) hours as needed for pain Max Daily Amount: 6 tablets, Disp: 120 tablet, Rfl: 0    multivitamin (THERAGRAN) TABS, Take 1 tablet by mouth daily, Disp: , Rfl:     Omega-3 Fatty Acids (FISH OIL) 1200 MG CAPS, Take 1,200 mg by mouth daily  , Disp: , Rfl:     Probiotic CAPS, Take 1 capsule by mouth daily  , Disp: , Rfl:     Allergies   Allergen Reactions    Codeine Nausea Only and Vomiting       Social History   Past Surgical History:   Procedure Laterality Date    DENTAL SURGERY      Impacted wisdom teeth    TONSILECTOMY AND ADNOIDECTOMY      TUBAL LIGATION Bilateral      Family History   Problem Relation Age of Onset    Hypertension Mother         Benign Essential    Stroke Mother     Hypertension Father         Benign Essential    Other Maternal Grandfather         Cardiac disorder    Other Other         Malignant neoplasm of stomach    Diabetes type II Other        Objective:  /70 (BP Location: Left arm, Patient Position: Sitting, Cuff Size: Standard)   Pulse 68   Temp 98 7 °F (37 1 °C) (Oral)   SpO2 98%      Physical Exam   Constitutional: She is oriented to person, place, and time  She appears well-developed and well-nourished  No distress  HENT:   Head: Normocephalic and atraumatic  Right Ear: Hearing, tympanic membrane, external ear and ear canal normal    Left Ear: Hearing, tympanic membrane, external ear and ear canal normal    Nose: No mucosal edema or rhinorrhea  Right sinus exhibits maxillary sinus tenderness  Right sinus exhibits no frontal sinus tenderness  Left sinus exhibits no maxillary sinus tenderness and no frontal sinus tenderness     Mouth/Throat: Uvula is midline, oropharynx is clear and moist and mucous membranes are normal  No oropharyngeal exudate  Cardiovascular: Normal rate and regular rhythm  Pulmonary/Chest: Effort normal and breath sounds normal  No respiratory distress  She has no wheezes  Lymphadenopathy:     She has no cervical adenopathy  Neurological: She is alert and oriented to person, place, and time  Skin: Skin is warm and dry  No rash noted  Psychiatric: She has a normal mood and affect  Her behavior is normal  Judgment and thought content normal    Nursing note and vitals reviewed

## 2019-01-10 ENCOUNTER — TELEPHONE (OUTPATIENT)
Dept: INTERNAL MEDICINE CLINIC | Facility: CLINIC | Age: 54
End: 2019-01-10

## 2019-01-10 NOTE — TELEPHONE ENCOUNTER
Pt called back  Reports mucinex is improving her URI symptoms, but when it wears off she continues with ear and sinus pain  Additionally she reports that she is starting with GI symptoms - looser stool, cramping in abd intermittently   She has started a bland diet  D/W pt that her symptoms still appear viral   They started on Sunday and has only been four days I would hold an antibiotic without her being re-seen as there was no symptoms of bacterial infection when I evaluated her on Monday  I encouraged her to continue her mucinex and flonase  In regard to her GI symptoms would continue with bland diet - soup, chicken broth, rice, toast   Avoiding dairy, sugars  Staying well hydrated is important as well  Informed pt I would extend her work note through weekend  Pt still wants abx and wants to speak with Dr Bo Money   I informed her he was on hospital rotation, but I would pass message along to him  Will send him a text as well as forward message to him  Of note she had a negative influenza on Monday

## 2019-01-10 NOTE — TELEPHONE ENCOUNTER
Jemima called this morning, she was seen on Monday for a sick visit  She is still taking Mucinex and is running a fever on and off  She hasn't been feeling well still and was asking if there can be an extension on her work note  She stated that when she lays down she feels a little better but once she stands up she gets a headache and needs to sit back down  And with her constantly moving around at work she doesn't feel like she should go back to work today  Also, because she is not feeling well she wants to know if there can be something else sent into the pharamcy, she did say that with being out of work and not making money she really can't afford to come in again to get something new

## 2019-01-14 ENCOUNTER — OFFICE VISIT (OUTPATIENT)
Dept: INTERNAL MEDICINE CLINIC | Facility: CLINIC | Age: 54
End: 2019-01-14
Payer: COMMERCIAL

## 2019-01-14 ENCOUNTER — TELEPHONE (OUTPATIENT)
Dept: INTERNAL MEDICINE CLINIC | Facility: CLINIC | Age: 54
End: 2019-01-14

## 2019-01-14 VITALS
HEART RATE: 51 BPM | TEMPERATURE: 98.6 F | SYSTOLIC BLOOD PRESSURE: 122 MMHG | OXYGEN SATURATION: 98 % | WEIGHT: 116.6 LBS | BODY MASS INDEX: 19.55 KG/M2 | DIASTOLIC BLOOD PRESSURE: 74 MMHG

## 2019-01-14 DIAGNOSIS — G89.29 CHRONIC BILATERAL LOW BACK PAIN WITHOUT SCIATICA: ICD-10-CM

## 2019-01-14 DIAGNOSIS — M54.50 CHRONIC BILATERAL LOW BACK PAIN WITHOUT SCIATICA: ICD-10-CM

## 2019-01-14 DIAGNOSIS — H65.91 RIGHT NON-SUPPURATIVE OTITIS MEDIA: Primary | ICD-10-CM

## 2019-01-14 DIAGNOSIS — J20.8 ACUTE BRONCHITIS DUE TO OTHER SPECIFIED ORGANISMS: ICD-10-CM

## 2019-01-14 PROCEDURE — 99213 OFFICE O/P EST LOW 20 MIN: CPT | Performed by: INTERNAL MEDICINE

## 2019-01-14 RX ORDER — CHLORPHENIRAMINE MALEATE 4 MG/1
4 TABLET ORAL EVERY 6 HOURS PRN
Qty: 30 TABLET | Refills: 0 | Status: SHIPPED | OUTPATIENT
Start: 2019-01-14 | End: 2019-06-10 | Stop reason: ALTCHOICE

## 2019-01-14 RX ORDER — HYDROCODONE BITARTRATE AND ACETAMINOPHEN 7.5; 325 MG/1; MG/1
1 TABLET ORAL EVERY 4 HOURS PRN
Qty: 120 TABLET | Refills: 0 | Status: SHIPPED | OUTPATIENT
Start: 2019-01-14 | End: 2019-02-08 | Stop reason: SDUPTHER

## 2019-01-14 RX ORDER — AZITHROMYCIN 250 MG/1
500 TABLET, FILM COATED ORAL EVERY 24 HOURS
Qty: 6 TABLET | Refills: 0 | Status: SHIPPED | OUTPATIENT
Start: 2019-01-14 | End: 2019-01-14 | Stop reason: SDUPTHER

## 2019-01-14 RX ORDER — AZITHROMYCIN 250 MG/1
TABLET, FILM COATED ORAL
Qty: 6 TABLET | Refills: 0 | Status: SHIPPED | OUTPATIENT
Start: 2019-01-14 | End: 2019-01-18

## 2019-01-14 NOTE — ASSESSMENT & PLAN NOTE
Continues with hydrocodone and acetaminophen  X-rays of her low back was reviewed  She has some joint space narrowing at L5-S1 with some facet arthropathy of the lower lumbar spine  We discussed physical therapy as a main stay in her treatment of her chronic back pain    Now that she has insurance this is an option that may be of value

## 2019-01-14 NOTE — PROGRESS NOTES
Assessment/Plan:    Chronic low back pain  Continues with hydrocodone and acetaminophen  X-rays of her low back was reviewed  She has some joint space narrowing at L5-S1 with some facet arthropathy of the lower lumbar spine  We discussed physical therapy as a main stay in her treatment of her chronic back pain  Now that she has insurance this is an option that may be of value    Acute bronchitis due to other specified organisms  Continue Mucinex tablets  We will prescribe azithromycin as well as chlorpheniramine as decongestant    Right non-suppurative otitis media  Chlorpheniramine and azithromycin have been prescribed  Diagnoses and all orders for this visit:    Right non-suppurative otitis media  -     azithromycin (ZITHROMAX) 250 mg tablet; Take 2 tablets (500 mg total) by mouth every 24 hours for 5 days  -     chlorpheniramine (CHLOR-TRIMETON) 4 MG tablet; Take 1 tablet (4 mg total) by mouth every 6 (six) hours as needed for rhinitis for up to 7 days    Acute bronchitis due to other specified organisms  -     azithromycin (ZITHROMAX) 250 mg tablet; Take 2 tablets (500 mg total) by mouth every 24 hours for 5 days    Chronic bilateral low back pain without sciatica  -     HYDROcodone-acetaminophen (NORCO) 7 5-325 mg per tablet; Take 1 tablet by mouth every 4 (four) hours as needed for pain for up to 30 days Max Daily Amount: 6 tablets          Subjective:      Patient ID: Grisel Meier is a 52 y o  female  Patient presents to the office for follow-up for upper respiratory infection  She complains of significant right ear discomfort along with nasal congestion sinus fullness and a painful, nonproductive cough  She denies fevers or chills but feels as if she may have had a low-grade fever today  Previously she had tested negative for influenza  She has been treated symptomatic lately and is not improving  She denies any sore throat    Given her many years of smoking there may be a very mild COPD element to her symptomatology  She denies night sweats or shaking chills  Appetite has been fair  Weight has been stable        Family History   Problem Relation Age of Onset    Hypertension Mother         Benign Essential    Stroke Mother     Hypertension Father         Benign Essential    Other Maternal Grandfather         Cardiac disorder    Other Other         Malignant neoplasm of stomach    Diabetes type II Other      Social History     Social History    Marital status:      Spouse name: N/A    Number of children: N/A    Years of education: N/A     Occupational History    Not on file       Social History Main Topics    Smoking status: Former Smoker     Quit date: 1/12/2018    Smokeless tobacco: Never Used    Alcohol use Yes      Comment: Rarely consumes alcohol    Drug use: No    Sexual activity: Not on file     Other Topics Concern    Not on file     Social History Narrative    Advance directive in chart    Caffeine Use         Past Medical History:   Diagnosis Date   Luis Eduardo Jyotsna Carrier of disease     Carrier or suspected carrier of other venereal diseases: Hepatitis B (Previous exposure, cannot donate blood)    Chronic low back pain     Eustachian tube dysfunction     Rotator cuff tendinitis        Current Outpatient Prescriptions:     calcium carbonate-vitamin D (OSCAL-D) 500 mg-200 units per tablet, Take 1 tablet by mouth daily, Disp: , Rfl:     fluticasone (FLONASE) 50 mcg/act nasal spray, 1 spray into each nostril daily, Disp: 16 g, Rfl: 0    HYDROcodone-acetaminophen (NORCO) 7 5-325 mg per tablet, Take 1 tablet by mouth every 4 (four) hours as needed for pain for up to 30 days Max Daily Amount: 6 tablets, Disp: 120 tablet, Rfl: 0    multivitamin (THERAGRAN) TABS, Take 1 tablet by mouth daily, Disp: , Rfl:     Omega-3 Fatty Acids (FISH OIL) 1200 MG CAPS, Take 1,200 mg by mouth daily  , Disp: , Rfl:     Probiotic CAPS, Take 1 capsule by mouth daily  , Disp: , Rfl:    azithromycin (ZITHROMAX) 250 mg tablet, Take 2 tablets (500 mg total) by mouth every 24 hours for 5 days, Disp: 6 tablet, Rfl: 0    chlorpheniramine (CHLOR-TRIMETON) 4 MG tablet, Take 1 tablet (4 mg total) by mouth every 6 (six) hours as needed for rhinitis for up to 7 days, Disp: 30 tablet, Rfl: 0  Allergies   Allergen Reactions    Codeine Nausea Only and Vomiting     Past Surgical History:   Procedure Laterality Date    DENTAL SURGERY      Impacted wisdom teeth    TONSILECTOMY AND ADNOIDECTOMY      TUBAL LIGATION Bilateral          Review of Systems   Constitutional: Positive for fatigue and fever (Possibly low-grade)  HENT: Positive for ear pain and sinus pressure  Eyes: Negative  Respiratory: Positive for cough  Negative for shortness of breath, wheezing and stridor  Cardiovascular: Positive for chest pain (Associated with coughing)  Gastrointestinal: Negative  Genitourinary: Negative  Musculoskeletal: Positive for myalgias  Allergic/Immunologic: Negative  Neurological: Negative  Hematological: Negative  Psychiatric/Behavioral: Negative  Objective:      /74 (BP Location: Left arm, Patient Position: Sitting, Cuff Size: Standard)   Pulse (!) 51   Temp 98 6 °F (37 °C) (Oral)   Wt 52 9 kg (116 lb 9 6 oz) Comment: with boots  SpO2 98%   BMI 19 55 kg/m²          Physical Exam   Constitutional: She is oriented to person, place, and time  She appears well-developed and well-nourished  No distress  HENT:   Head: Normocephalic and atraumatic  Right Ear: External ear normal  No drainage  Tympanic membrane is retracted  Decreased hearing is noted  Left Ear: Hearing, tympanic membrane, external ear and ear canal normal    Neck: No tracheal tenderness present  Carotid bruit is not present  No tracheal deviation present  No thyroid mass present  Cardiovascular: Normal rate, regular rhythm and normal heart sounds  No murmur heard    Pulmonary/Chest: Effort normal and breath sounds normal  No respiratory distress  She has no wheezes  She has no rales  Abdominal: Soft  She exhibits no distension  Musculoskeletal: Normal range of motion  She exhibits no edema  Lymphadenopathy:     She has no cervical adenopathy  Neurological: She is alert and oriented to person, place, and time  Skin: Skin is warm and dry  No rash noted  She is not diaphoretic  Psychiatric: She has a normal mood and affect  Thought content normal    Vitals reviewed

## 2019-01-14 NOTE — TELEPHONE ENCOUNTER
[address] called looking for clarification on the azithromycin (ZITHROMAX) 250 mg tablet     The quantity sent does not match the instructions  They are requesting the script be resent with proper instructions or updated quantity      Thank you-

## 2019-01-15 NOTE — TELEPHONE ENCOUNTER
Patient called to see if this was handled  Attempted three times to reach the patient but, there was no answer  Left a message to call the office if she would still like care advice

## 2019-02-08 DIAGNOSIS — M54.50 CHRONIC BILATERAL LOW BACK PAIN WITHOUT SCIATICA: ICD-10-CM

## 2019-02-08 DIAGNOSIS — G89.29 CHRONIC BILATERAL LOW BACK PAIN WITHOUT SCIATICA: ICD-10-CM

## 2019-02-08 RX ORDER — HYDROCODONE BITARTRATE AND ACETAMINOPHEN 7.5; 325 MG/1; MG/1
1 TABLET ORAL EVERY 4 HOURS PRN
Qty: 120 TABLET | Refills: 0 | Status: SHIPPED | OUTPATIENT
Start: 2019-02-08 | End: 2019-03-01 | Stop reason: SDUPTHER

## 2019-03-01 DIAGNOSIS — M54.50 CHRONIC BILATERAL LOW BACK PAIN WITHOUT SCIATICA: ICD-10-CM

## 2019-03-01 DIAGNOSIS — G89.29 CHRONIC BILATERAL LOW BACK PAIN WITHOUT SCIATICA: ICD-10-CM

## 2019-03-01 RX ORDER — HYDROCODONE BITARTRATE AND ACETAMINOPHEN 7.5; 325 MG/1; MG/1
1 TABLET ORAL EVERY 4 HOURS PRN
Qty: 120 TABLET | Refills: 0 | Status: SHIPPED | OUTPATIENT
Start: 2019-03-01 | End: 2019-03-25 | Stop reason: SDUPTHER

## 2019-03-25 DIAGNOSIS — G89.29 CHRONIC BILATERAL LOW BACK PAIN WITHOUT SCIATICA: ICD-10-CM

## 2019-03-25 DIAGNOSIS — M54.50 CHRONIC BILATERAL LOW BACK PAIN WITHOUT SCIATICA: ICD-10-CM

## 2019-03-25 RX ORDER — HYDROCODONE BITARTRATE AND ACETAMINOPHEN 7.5; 325 MG/1; MG/1
1 TABLET ORAL EVERY 4 HOURS PRN
Qty: 120 TABLET | Refills: 0 | Status: SHIPPED | OUTPATIENT
Start: 2019-03-25 | End: 2019-04-17 | Stop reason: SDUPTHER

## 2019-03-27 ENCOUNTER — HOSPITAL ENCOUNTER (OUTPATIENT)
Dept: RADIOLOGY | Facility: HOSPITAL | Age: 54
Discharge: HOME | End: 2019-03-27
Attending: OBSTETRICS & GYNECOLOGY
Payer: COMMERCIAL

## 2019-03-27 VITALS — BODY MASS INDEX: 19.33 KG/M2 | WEIGHT: 116 LBS | HEIGHT: 65 IN

## 2019-03-27 DIAGNOSIS — Z12.39 BREAST CANCER SCREENING: ICD-10-CM

## 2019-03-27 PROCEDURE — 77067 SCR MAMMO BI INCL CAD: CPT

## 2019-04-17 DIAGNOSIS — M54.50 CHRONIC BILATERAL LOW BACK PAIN WITHOUT SCIATICA: ICD-10-CM

## 2019-04-17 DIAGNOSIS — G89.29 CHRONIC BILATERAL LOW BACK PAIN WITHOUT SCIATICA: ICD-10-CM

## 2019-04-17 RX ORDER — HYDROCODONE BITARTRATE AND ACETAMINOPHEN 7.5; 325 MG/1; MG/1
1 TABLET ORAL EVERY 4 HOURS PRN
Qty: 120 TABLET | Refills: 0 | Status: SHIPPED | OUTPATIENT
Start: 2019-04-17 | End: 2019-05-13 | Stop reason: SDUPTHER

## 2019-05-13 DIAGNOSIS — M54.50 CHRONIC BILATERAL LOW BACK PAIN WITHOUT SCIATICA: ICD-10-CM

## 2019-05-13 DIAGNOSIS — G89.29 CHRONIC BILATERAL LOW BACK PAIN WITHOUT SCIATICA: ICD-10-CM

## 2019-05-14 RX ORDER — HYDROCODONE BITARTRATE AND ACETAMINOPHEN 7.5; 325 MG/1; MG/1
1 TABLET ORAL EVERY 4 HOURS PRN
Qty: 120 TABLET | Refills: 0 | Status: SHIPPED | OUTPATIENT
Start: 2019-05-14 | End: 2019-06-03

## 2019-06-10 ENCOUNTER — OFFICE VISIT (OUTPATIENT)
Dept: INTERNAL MEDICINE CLINIC | Facility: CLINIC | Age: 54
End: 2019-06-10
Payer: COMMERCIAL

## 2019-06-10 VITALS
TEMPERATURE: 98.3 F | OXYGEN SATURATION: 98 % | HEIGHT: 65 IN | DIASTOLIC BLOOD PRESSURE: 58 MMHG | BODY MASS INDEX: 19.46 KG/M2 | SYSTOLIC BLOOD PRESSURE: 100 MMHG | HEART RATE: 63 BPM | WEIGHT: 116.8 LBS

## 2019-06-10 DIAGNOSIS — Z11.59 NEED FOR HEPATITIS C SCREENING TEST: ICD-10-CM

## 2019-06-10 DIAGNOSIS — Z12.11 SCREENING FOR MALIGNANT NEOPLASM OF COLON: ICD-10-CM

## 2019-06-10 DIAGNOSIS — Z23 NEED FOR DIPHTHERIA-TETANUS-PERTUSSIS (TDAP) VACCINE: ICD-10-CM

## 2019-06-10 DIAGNOSIS — M47.817 SPONDYLOSIS OF LUMBOSACRAL REGION WITHOUT MYELOPATHY OR RADICULOPATHY: ICD-10-CM

## 2019-06-10 DIAGNOSIS — G89.29 CHRONIC MIDLINE LOW BACK PAIN WITHOUT SCIATICA: Primary | ICD-10-CM

## 2019-06-10 DIAGNOSIS — M54.50 CHRONIC MIDLINE LOW BACK PAIN WITHOUT SCIATICA: Primary | ICD-10-CM

## 2019-06-10 PROBLEM — R68.89 FLU-LIKE SYMPTOMS: Status: RESOLVED | Noted: 2019-01-07 | Resolved: 2019-06-10

## 2019-06-10 PROBLEM — M70.61 TROCHANTERIC BURSITIS OF RIGHT HIP: Status: RESOLVED | Noted: 2018-08-15 | Resolved: 2019-06-10

## 2019-06-10 PROBLEM — H65.91 RIGHT NON-SUPPURATIVE OTITIS MEDIA: Status: RESOLVED | Noted: 2019-01-14 | Resolved: 2019-06-10

## 2019-06-10 PROBLEM — J01.00 ACUTE NON-RECURRENT MAXILLARY SINUSITIS: Status: RESOLVED | Noted: 2018-11-15 | Resolved: 2019-06-10

## 2019-06-10 PROBLEM — J20.8 ACUTE BRONCHITIS DUE TO OTHER SPECIFIED ORGANISMS: Status: RESOLVED | Noted: 2019-01-14 | Resolved: 2019-06-10

## 2019-06-10 PROCEDURE — 99213 OFFICE O/P EST LOW 20 MIN: CPT | Performed by: INTERNAL MEDICINE

## 2019-06-10 PROCEDURE — 3008F BODY MASS INDEX DOCD: CPT | Performed by: INTERNAL MEDICINE

## 2019-06-10 PROCEDURE — 1036F TOBACCO NON-USER: CPT | Performed by: INTERNAL MEDICINE

## 2019-06-10 RX ORDER — HYDROCODONE BITARTRATE AND ACETAMINOPHEN 7.5; 325 MG/1; MG/1
1 TABLET ORAL EVERY 4 HOURS PRN
Qty: 120 TABLET | Refills: 0 | Status: SHIPPED | OUTPATIENT
Start: 2019-06-10 | End: 2019-07-11 | Stop reason: SDUPTHER

## 2019-07-11 DIAGNOSIS — M54.50 CHRONIC MIDLINE LOW BACK PAIN WITHOUT SCIATICA: ICD-10-CM

## 2019-07-11 DIAGNOSIS — G89.29 CHRONIC MIDLINE LOW BACK PAIN WITHOUT SCIATICA: ICD-10-CM

## 2019-07-11 RX ORDER — HYDROCODONE BITARTRATE AND ACETAMINOPHEN 7.5; 325 MG/1; MG/1
1 TABLET ORAL EVERY 4 HOURS PRN
Qty: 120 TABLET | Refills: 0 | Status: SHIPPED | OUTPATIENT
Start: 2019-07-11 | End: 2019-08-09 | Stop reason: SDUPTHER

## 2019-08-09 DIAGNOSIS — G89.29 CHRONIC MIDLINE LOW BACK PAIN WITHOUT SCIATICA: ICD-10-CM

## 2019-08-09 DIAGNOSIS — M54.50 CHRONIC MIDLINE LOW BACK PAIN WITHOUT SCIATICA: ICD-10-CM

## 2019-08-09 RX ORDER — HYDROCODONE BITARTRATE AND ACETAMINOPHEN 7.5; 325 MG/1; MG/1
1 TABLET ORAL EVERY 4 HOURS PRN
Qty: 120 TABLET | Refills: 0 | Status: SHIPPED | OUTPATIENT
Start: 2019-08-09 | End: 2019-09-04 | Stop reason: SDUPTHER

## 2019-09-04 DIAGNOSIS — M54.50 CHRONIC MIDLINE LOW BACK PAIN WITHOUT SCIATICA: ICD-10-CM

## 2019-09-04 DIAGNOSIS — G89.29 CHRONIC MIDLINE LOW BACK PAIN WITHOUT SCIATICA: ICD-10-CM

## 2019-09-05 RX ORDER — HYDROCODONE BITARTRATE AND ACETAMINOPHEN 7.5; 325 MG/1; MG/1
1 TABLET ORAL EVERY 4 HOURS PRN
Qty: 120 TABLET | Refills: 0 | Status: SHIPPED | OUTPATIENT
Start: 2019-09-05 | End: 2019-09-27 | Stop reason: SDUPTHER

## 2019-09-27 DIAGNOSIS — G89.29 CHRONIC MIDLINE LOW BACK PAIN WITHOUT SCIATICA: ICD-10-CM

## 2019-09-27 DIAGNOSIS — M54.50 CHRONIC MIDLINE LOW BACK PAIN WITHOUT SCIATICA: ICD-10-CM

## 2019-09-27 NOTE — TELEPHONE ENCOUNTER
[id number] [address] Traore per PDMP site    Last appt, 6/10/19    Next appt, none pending    Will have patient reschedule follow up appt

## 2019-09-30 RX ORDER — HYDROCODONE BITARTRATE AND ACETAMINOPHEN 7.5; 325 MG/1; MG/1
1 TABLET ORAL EVERY 4 HOURS PRN
Qty: 120 TABLET | Refills: 0 | Status: SHIPPED | OUTPATIENT
Start: 2019-09-30 | End: 2019-10-30 | Stop reason: SDUPTHER

## 2019-10-30 DIAGNOSIS — M54.50 CHRONIC MIDLINE LOW BACK PAIN WITHOUT SCIATICA: ICD-10-CM

## 2019-10-30 DIAGNOSIS — G89.29 CHRONIC MIDLINE LOW BACK PAIN WITHOUT SCIATICA: ICD-10-CM

## 2019-10-30 RX ORDER — HYDROCODONE BITARTRATE AND ACETAMINOPHEN 7.5; 325 MG/1; MG/1
1 TABLET ORAL EVERY 4 HOURS PRN
Qty: 120 TABLET | Refills: 0 | Status: SHIPPED | OUTPATIENT
Start: 2019-10-30 | End: 2019-11-29 | Stop reason: SDUPTHER

## 2019-11-11 ENCOUNTER — OFFICE VISIT (OUTPATIENT)
Dept: INTERNAL MEDICINE CLINIC | Age: 54
End: 2019-11-11
Payer: COMMERCIAL

## 2019-11-11 VITALS
BODY MASS INDEX: 19.22 KG/M2 | SYSTOLIC BLOOD PRESSURE: 108 MMHG | TEMPERATURE: 97.6 F | HEIGHT: 65 IN | DIASTOLIC BLOOD PRESSURE: 64 MMHG | WEIGHT: 115.4 LBS | HEART RATE: 64 BPM | OXYGEN SATURATION: 98 %

## 2019-11-11 DIAGNOSIS — H10.33 ACUTE CONJUNCTIVITIS OF BOTH EYES, UNSPECIFIED ACUTE CONJUNCTIVITIS TYPE: ICD-10-CM

## 2019-11-11 DIAGNOSIS — H10.33 ACUTE BACTERIAL CONJUNCTIVITIS OF BOTH EYES: Primary | ICD-10-CM

## 2019-11-11 PROCEDURE — 99213 OFFICE O/P EST LOW 20 MIN: CPT | Performed by: INTERNAL MEDICINE

## 2019-11-11 RX ORDER — OFLOXACIN 3 MG/ML
1 SOLUTION/ DROPS OPHTHALMIC 4 TIMES DAILY
Qty: 5 ML | Refills: 0 | Status: SHIPPED | OUTPATIENT
Start: 2019-11-11 | End: 2019-12-02 | Stop reason: ALTCHOICE

## 2019-11-11 RX ORDER — KETOTIFEN FUMARATE 0.35 MG/ML
1 SOLUTION/ DROPS OPHTHALMIC 2 TIMES DAILY
Qty: 5 ML | Refills: 0 | Status: SHIPPED | OUTPATIENT
Start: 2019-11-11 | End: 2021-01-25 | Stop reason: ALTCHOICE

## 2019-11-11 NOTE — PATIENT INSTRUCTIONS
Conjunctivitis   AMBULATORY CARE:   Conjunctivitis,  or pink eye, is inflammation of your conjunctiva  The conjunctiva is a thin tissue that covers the front of your eye and the back of your eyelids  The conjunctiva helps protect your eye and keep it moist  Conjunctivitis may be caused by bacteria, allergies, or a virus  If your conjunctivitis is caused by bacteria, it may get better on its own in about 7 days  Viral conjunctivitis can last up to 3 weeks  Common symptoms may include any of the following: You will usually have symptoms in both eyes if your conjunctivitis is caused by allergies  You may also have other allergic symptoms, such as a rash or runny nose  Symptoms will usually start in 1 eye if your conjunctivitis is caused by a virus or bacteria  · Redness in the whites of your eye    · Itching in your eye or around your eye    · Feeling like there is something in your eye    · Watery or thick, sticky discharge    · Crusty eyelids when you wake up in the morning    · Burning, stinging, or swelling in your eye    · Pain when you see bright light  Seek care immediately if:   · You have worsening eye pain  · The swelling in your eye gets worse, even after treatment  · Your vision suddenly becomes worse or you cannot see at all  Contact your healthcare provider if:   · You develop a fever and ear pain  · You have tiny bumps or spots of blood on your eye  · You have questions or concerns about your condition or care  Treatment  will depend on the cause of your conjunctivitis  You may need antibiotics or allergy medicine as a pill, eye drop, or eye ointment  Manage your symptoms:   · Apply a cool compress  Wet a washcloth with cold water and place it on your eye  This will help decrease itching and irritation  · Do not wear contact lenses  They can irritate your eye  Throw away the pair you are using and ask when you can wear them again   Use a new pair of lenses when your healthcare provider says it is okay  · Avoid irritants  Stay away from smoke filled areas  Shield your eyes from wind and sun  · Flush your eye  You may need to flush your eye with saline to help decrease your symptoms  Ask for more information on how to flush your eye  Medicines:  Treatment depends on what is causing your conjunctivitis  You may be given any of the following:  · Allergy medicine  helps decrease itchy, red, swollen eyes caused by allergies  It may be given as a pill, eye drops, or nasal spray  · Antibiotics  may be needed if your conjunctivitis is caused by bacteria  This medicine may be given as a pill, eye drops, or eye ointment  · Take your medicine as directed  Contact your healthcare provider if you think your medicine is not helping or if you have side effects  Tell him or her if you are allergic to any medicine  Keep a list of the medicines, vitamins, and herbs you take  Include the amounts, and when and why you take them  Bring the list or the pill bottles to follow-up visits  Carry your medicine list with you in case of an emergency  Prevent the spread of conjunctivitis:   · Wash your hands with soap and water often  Wash your hands before and after you touch your eyes  Also wash your hands before you prepare or eat food and after you use the bathroom or change a diaper  · Avoid allergens  Try to avoid the things that cause your allergies, such as pets, dust, or grass  · Avoid contact with others  Do not share towels or washcloths  Try to stay away from others as much as possible  Ask when you can return to work or school  · Throw away eye makeup  The bacteria that caused your conjunctivitis can stay in eye makeup  Throw away mascara and other eye makeup  © 2017 [address]  Information is for End User's use only and may not be sold, redistributed or otherwise used for commercial purposes   All illustrations and images included in [facility] are the copyrighted property of [employer]  or [employer]  The above information is an  only  It is not intended as medical advice for individual conditions or treatments  Talk to your doctor, nurse or pharmacist before following any medical regimen to see if it is safe and effective for you

## 2019-11-11 NOTE — LETTER
November 11, 2019     Patient: Smith Lawson   YOB: 1965   Date of Visit: 11/11/2019       To Whom it May Concern:    Mode Hernández is under my professional care  She was seen in my office on 11/11/2019  She may return to work on 11/13/19  If you have any questions or concerns, please don't hesitate to call           Sincerely,          Nisreen Burnham DO        CC: No Recipients

## 2019-11-11 NOTE — PROGRESS NOTES
Assessment/Plan:    Acute bacterial conjunctivitis of the left eye  - will start patient on ofloxacin eyedrops  -she has been counseled to avoid using make up and to throw away the make up she used on the infected left eye  -she was counseled to avoid touching her eyes  -will give patient a doctor's excuse note for today and tomorrow    Acute allergic conjunctivitis of both eyes  - will start patient on ketotifen eyedrops     Diagnoses and all orders for this visit:    Acute bacterial conjunctivitis of both eyes  -     ketotifen (ZADITOR) 0 025 % ophthalmic solution; Administer 1 drop to both eyes 2 (two) times a day  -     ofloxacin (OCUFLOX) 0 3 % ophthalmic solution; Administer 1 drop to both eyes 4 (four) times a day    Acute conjunctivitis of both eyes, unspecified acute conjunctivitis type  -     ketotifen (ZADITOR) 0 025 % ophthalmic solution; Administer 1 drop to both eyes 2 (two) times a day          Subjective:      Patient ID: Mindy Weber is a 52 y o  female  HPI  Patient presents with complaints of redness, itching, tearing that started in her left eye about 3 days ago and proceeded to involve the right eye also  She subsequently developed a light yellow discharge that sticks her eyelashes together and forms a crust on her eyelashes  She denies  pain but admits to initial foreign body sensation in her left eye  She also denies photophobia and change in vision  She denies fever, chills, night sweats, chest pain, shortness of breath, palpitations, nausea, vomiting abdominal pain, diarrhea, constipation  Of note, patient has a history of seasonal allergies  She denies a history of contact  The following portions of the patient's history were reviewed and updated as appropriate:   She  has a past medical history of Carrier of disease, Chronic low back pain, Eustachian tube dysfunction, and Rotator cuff tendinitis    She   Patient Active Problem List    Diagnosis Date Noted    Acute bacterial conjunctivitis of both eyes 11/11/2019    Other constipation 04/26/2018    Vitamin D deficiency 08/28/2014    Arthritis, degenerative 11/08/2013    Chronic low back pain 11/08/2013     She  has a past surgical history that includes Dental surgery; TONSILECTOMY AND ADNOIDECTOMY; and Tubal ligation (Bilateral)  Her family history includes Diabetes type II in her other; Hypertension in her father and mother; Other in her maternal grandfather and other; Stroke in her mother  She  reports that she quit smoking about 21 months ago  Her smoking use included cigarettes  She has never used smokeless tobacco  She reports that she drinks alcohol  She reports that she does not use drugs  Current Outpatient Medications   Medication Sig Dispense Refill    calcium carbonate-vitamin D (OSCAL-D) 500 mg-200 units per tablet Take 1 tablet by mouth daily      HYDROcodone-acetaminophen (NORCO) 7 5-325 mg per tablet Take 1 tablet by mouth every 4 (four) hours as needed for painMax Daily Amount: 6 tablets 120 tablet 0    Omega-3 Fatty Acids (FISH OIL) 1200 MG CAPS Take 1,200 mg by mouth daily        Probiotic CAPS Take 1 capsule by mouth daily        ketotifen (ZADITOR) 0 025 % ophthalmic solution Administer 1 drop to both eyes 2 (two) times a day 5 mL 0    multivitamin (THERAGRAN) TABS Take 1 tablet by mouth daily      ofloxacin (OCUFLOX) 0 3 % ophthalmic solution Administer 1 drop to both eyes 4 (four) times a day 5 mL 0     No current facility-administered medications for this visit        Current Outpatient Medications on File Prior to Visit   Medication Sig    calcium carbonate-vitamin D (OSCAL-D) 500 mg-200 units per tablet Take 1 tablet by mouth daily    HYDROcodone-acetaminophen (NORCO) 7 5-325 mg per tablet Take 1 tablet by mouth every 4 (four) hours as needed for painMax Daily Amount: 6 tablets    Omega-3 Fatty Acids (FISH OIL) 1200 MG CAPS Take 1,200 mg by mouth daily      Probiotic CAPS Take 1 capsule by mouth daily      multivitamin (THERAGRAN) TABS Take 1 tablet by mouth daily     No current facility-administered medications on file prior to visit  She is allergic to codeine       Review of Systems   Constitutional: Negative for activity change, chills, fatigue, fever and unexpected weight change  HENT: Negative for ear pain, postnasal drip, rhinorrhea, sinus pressure and sore throat  Eyes: Positive for discharge (clear colored discharge with crusty discharge on her lashes  started with a foreign body sensation 3 days ago), redness and itching  Negative for photophobia, pain and visual disturbance  Respiratory: Negative for cough, choking, chest tightness, shortness of breath and wheezing  Cardiovascular: Negative for chest pain, palpitations and leg swelling  Gastrointestinal: Negative for abdominal pain, constipation, diarrhea, nausea and vomiting  Genitourinary: Negative for dysuria and hematuria  Musculoskeletal: Negative for arthralgias, back pain, gait problem, joint swelling, myalgias and neck stiffness  Skin: Negative for pallor and rash  Neurological: Negative for dizziness, tremors, seizures, syncope, light-headedness and headaches  Hematological: Negative for adenopathy  Psychiatric/Behavioral: Negative for behavioral problems           Past Medical History:   Diagnosis Date   [facility] Carrier of disease     Carrier or suspected carrier of other venereal diseases: Hepatitis B (Previous exposure, cannot donate blood)    Chronic low back pain     Eustachian tube dysfunction     Rotator cuff tendinitis          Current Outpatient Medications:     calcium carbonate-vitamin D (OSCAL-D) 500 mg-200 units per tablet, Take 1 tablet by mouth daily, Disp: , Rfl:     HYDROcodone-acetaminophen (NORCO) 7 5-325 mg per tablet, Take 1 tablet by mouth every 4 (four) hours as needed for painMax Daily Amount: 6 tablets, Disp: 120 tablet, Rfl: 0    Omega-3 Fatty Acids (FISH OIL) 1200 MG CAPS, Take 1,200 mg by mouth daily  , Disp: , Rfl:     Probiotic CAPS, Take 1 capsule by mouth daily  , Disp: , Rfl:     ketotifen (ZADITOR) 0 025 % ophthalmic solution, Administer 1 drop to both eyes 2 (two) times a day, Disp: 5 mL, Rfl: 0    multivitamin (THERAGRAN) TABS, Take 1 tablet by mouth daily, Disp: , Rfl:     ofloxacin (OCUFLOX) 0 3 % ophthalmic solution, Administer 1 drop to both eyes 4 (four) times a day, Disp: 5 mL, Rfl: 0    Allergies   Allergen Reactions    Codeine Nausea Only and Vomiting       Social History   Past Surgical History:   Procedure Laterality Date    DENTAL SURGERY      Impacted wisdom teeth    TONSILECTOMY AND ADNOIDECTOMY      TUBAL LIGATION Bilateral      Family History   Problem Relation Age of Onset    Hypertension Mother         Benign Essential    Stroke Mother     Hypertension Father         Benign Essential    Other Maternal Grandfather         Cardiac disorder    Other Other         Malignant neoplasm of stomach    Diabetes type II Other        Objective:  /64 (BP Location: Left arm, Patient Position: Sitting, Cuff Size: Standard)   Pulse 64   Temp 97 6 °F (36 4 °C) (Tympanic)   Ht 5' 5" (1 651 m) Comment: shoes off  Wt 52 3 kg (115 lb 6 4 oz) Comment: shoes off  SpO2 98%   BMI 19 20 kg/m²        Physical Exam   Constitutional: She is oriented to person, place, and time  She appears well-developed and well-nourished  No distress  HENT:   Head: Normocephalic and atraumatic  Right Ear: External ear normal    Left Ear: External ear normal    Nose: Nose normal    Mouth/Throat: Oropharynx is clear and moist  No oropharyngeal exudate  Eyes: Pupils are equal, round, and reactive to light  EOM are normal  Right eye exhibits chemosis  Right eye exhibits no discharge  Left eye exhibits chemosis and discharge  Right conjunctiva is injected  Left conjunctiva is injected  No scleral icterus  Neck: Normal range of motion  Neck supple  No JVD present   No tracheal deviation present  No thyromegaly present  Cardiovascular: Normal rate, regular rhythm, normal heart sounds and intact distal pulses  Exam reveals no gallop and no friction rub  No murmur heard  Pulmonary/Chest: Effort normal and breath sounds normal  No respiratory distress  She has no wheezes  She has no rales  She exhibits no tenderness  Abdominal: Soft  Bowel sounds are normal  She exhibits no distension and no mass  There is no tenderness  There is no rebound and no guarding  Musculoskeletal: Normal range of motion  She exhibits no edema, tenderness or deformity  Lymphadenopathy:     She has no cervical adenopathy  Neurological: She is alert and oriented to person, place, and time  She has normal reflexes  No cranial nerve deficit  She exhibits normal muscle tone  Coordination normal    Skin: Skin is warm and dry  No rash noted  She is not diaphoretic  No erythema  No pallor  Psychiatric: She has a normal mood and affect   Her behavior is normal

## 2019-11-20 ENCOUNTER — ANNUAL EXAM (OUTPATIENT)
Dept: OBGYN CLINIC | Facility: CLINIC | Age: 54
End: 2019-11-20
Payer: COMMERCIAL

## 2019-11-20 VITALS
WEIGHT: 116.8 LBS | DIASTOLIC BLOOD PRESSURE: 62 MMHG | HEIGHT: 65 IN | BODY MASS INDEX: 19.46 KG/M2 | SYSTOLIC BLOOD PRESSURE: 108 MMHG

## 2019-11-20 DIAGNOSIS — Z12.39 BREAST CANCER SCREENING: ICD-10-CM

## 2019-11-20 DIAGNOSIS — Z01.419 ENCOUNTER FOR ANNUAL ROUTINE GYNECOLOGICAL EXAMINATION: Primary | ICD-10-CM

## 2019-11-20 PROCEDURE — S0612 ANNUAL GYNECOLOGICAL EXAMINA: HCPCS | Performed by: OBSTETRICS & GYNECOLOGY

## 2019-11-20 NOTE — PROGRESS NOTES
Assessment/Plan:  Pap smear done for cytology, reflex HPV  Encouraged self-breast examination as well as calcium supplementation  Continue annual mammogram   Reviewed colon cancer screening including colonoscopy  She will continue to follow-up with her family physician as scheduled  Return to office in 1 year or p r n  No problem-specific Assessment & Plan notes found for this encounter  Diagnoses and all orders for this visit:    Encounter for annual routine gynecological examination  -     Thinprep Tis Pap Reflex HPV mRNA E6/E7    Breast cancer screening  -     Mammo screening bilateral w 3d & cad; Future          Subjective:      Patient ID: Kaylan Hitchcock is a 52 y o  female  HPI     This is a 66-year-old female  ( x2, age 25, 23) presents for her annual gyn exam   Patient went through menopause at age 44  She has never been on hormone replacement therapy  She denies any vaginal bleeding or spotting  No changes in bowel or bladder function  Patient has been  for 18 years  She was last sexually active approximately 4 years ago  Patient does follow up with her family physician on a regular basis  She has never had a screening colonoscopy  The following portions of the patient's history were reviewed and updated as appropriate: allergies, current medications, past family history, past medical history, past social history, past surgical history and problem list     Review of Systems   Constitutional: Negative for fatigue, fever and unexpected weight change  Respiratory: Negative for cough, chest tightness, shortness of breath and wheezing  Cardiovascular: Negative  Negative for chest pain and palpitations  Gastrointestinal: Negative  Negative for abdominal distention, abdominal pain, blood in stool, constipation, diarrhea, nausea and vomiting  Genitourinary: Negative    Negative for difficulty urinating, dyspareunia, dysuria, flank pain, frequency, genital sores, hematuria, pelvic pain, urgency, vaginal bleeding, vaginal discharge and vaginal pain  Skin: Negative for rash  Objective:      /62   Ht 5' 5" (1 651 m)   Wt 53 kg (116 lb 12 8 oz)   Breastfeeding? No   BMI 19 44 kg/m²          Physical Exam   Constitutional: She appears well-developed and well-nourished  Cardiovascular: Normal rate and regular rhythm  Pulmonary/Chest: Effort normal and breath sounds normal  Right breast exhibits no inverted nipple, no mass, no nipple discharge, no skin change and no tenderness  Left breast exhibits no inverted nipple, no mass, no nipple discharge, no skin change and no tenderness  Abdominal: Soft  Bowel sounds are normal  She exhibits no distension  There is no tenderness  There is no rebound and no guarding  Genitourinary: Vagina normal and uterus normal  There is no lesion on the right labia  There is no lesion on the left labia  Cervix exhibits no discharge and no friability  Right adnexum displays no mass, no tenderness and no fullness  Left adnexum displays no mass, no tenderness and no fullness  No vaginal discharge found  Genitourinary Comments: The vagina is evident of slight estrogen deficiency  There is no evidence of pelvic floor prolapse  Rectovaginal exam is confirmatory

## 2019-11-26 LAB
CLINICAL INFO: NORMAL
CYTOLOGY CMNT CVX/VAG CYTO-IMP: NORMAL
DATE PREVIOUS BX: NORMAL
LMP START DATE: NORMAL
SL AMB PREV. PAP:: NORMAL
SPECIMEN SOURCE CVX/VAG CYTO: NORMAL

## 2019-11-29 DIAGNOSIS — M54.50 CHRONIC MIDLINE LOW BACK PAIN WITHOUT SCIATICA: ICD-10-CM

## 2019-11-29 DIAGNOSIS — G89.29 CHRONIC MIDLINE LOW BACK PAIN WITHOUT SCIATICA: ICD-10-CM

## 2019-11-29 RX ORDER — HYDROCODONE BITARTRATE AND ACETAMINOPHEN 7.5; 325 MG/1; MG/1
1 TABLET ORAL EVERY 4 HOURS PRN
Qty: 120 TABLET | Refills: 0 | Status: SHIPPED | OUTPATIENT
Start: 2019-11-29 | End: 2019-12-26 | Stop reason: SDUPTHER

## 2019-12-02 ENCOUNTER — OFFICE VISIT (OUTPATIENT)
Dept: INTERNAL MEDICINE CLINIC | Facility: CLINIC | Age: 54
End: 2019-12-02
Payer: COMMERCIAL

## 2019-12-02 VITALS
SYSTOLIC BLOOD PRESSURE: 106 MMHG | DIASTOLIC BLOOD PRESSURE: 64 MMHG | OXYGEN SATURATION: 98 % | BODY MASS INDEX: 18.66 KG/M2 | HEART RATE: 65 BPM | HEIGHT: 65 IN | WEIGHT: 112 LBS | TEMPERATURE: 98.2 F

## 2019-12-02 DIAGNOSIS — Z12.11 SCREENING FOR COLON CANCER: Primary | ICD-10-CM

## 2019-12-02 DIAGNOSIS — Z12.11 SCREENING FOR COLORECTAL CANCER: ICD-10-CM

## 2019-12-02 DIAGNOSIS — G89.29 CHRONIC MIDLINE LOW BACK PAIN WITHOUT SCIATICA: ICD-10-CM

## 2019-12-02 DIAGNOSIS — L81.9 PIGMENTED SKIN LESIONS: ICD-10-CM

## 2019-12-02 DIAGNOSIS — M54.50 CHRONIC MIDLINE LOW BACK PAIN WITHOUT SCIATICA: ICD-10-CM

## 2019-12-02 DIAGNOSIS — M47.817 SPONDYLOSIS OF LUMBOSACRAL REGION WITHOUT MYELOPATHY OR RADICULOPATHY: ICD-10-CM

## 2019-12-02 DIAGNOSIS — Z12.12 SCREENING FOR COLORECTAL CANCER: ICD-10-CM

## 2019-12-02 PROBLEM — K59.09 OTHER CONSTIPATION: Status: RESOLVED | Noted: 2018-04-26 | Resolved: 2019-12-02

## 2019-12-02 PROBLEM — H10.33 ACUTE BACTERIAL CONJUNCTIVITIS OF BOTH EYES: Status: RESOLVED | Noted: 2019-11-11 | Resolved: 2019-12-02

## 2019-12-02 PROCEDURE — 3008F BODY MASS INDEX DOCD: CPT | Performed by: INTERNAL MEDICINE

## 2019-12-02 PROCEDURE — 1036F TOBACCO NON-USER: CPT | Performed by: INTERNAL MEDICINE

## 2019-12-02 PROCEDURE — 99214 OFFICE O/P EST MOD 30 MIN: CPT | Performed by: INTERNAL MEDICINE

## 2019-12-02 NOTE — PROGRESS NOTES
Assessment/Plan:    Pigmented skin lesions  Multiple pigmented lesions of the left lower extremity  These have been present for a number of years  A referral was made to Dermatology to evaluate for possible excision    Chronic low back pain  Continued exercises for her low back and for strengthening of her abdominal muscles  As needed hydrocodone will be refilled    Screening for colorectal cancer  A referral was made to Gastroenterology for colon cancer screening  Arthritis, degenerative  Continued use of OTC anti-inflammatories as well as hydrocodone with Tylenol and physical training/exercises  Diagnoses and all orders for this visit:    Screening for colon cancer  -     Ambulatory referral for colonoscopy; Future    Pigmented skin lesions  -     Ambulatory referral to Dermatology; Future    Chronic midline low back pain without sciatica    Screening for colorectal cancer    Spondylosis of lumbosacral region without myelopathy or radiculopathy    Other orders  -     Cancel: Cologuard; Future          Subjective:   Chief Complaint   Patient presents with    Follow-up     10 M f/u visit for chronic low back pain   Skin Lesion     She c/o skin lesions on her left leg  Patient ID: Alfredo Cerda is a 52 y o  female w/ PMHx of chronic low back pain who presents today for a medication refill  Pt has been on low dose opiods for her low back pain and notes that she still continues to experience chronic pain  Pt states that she is now experiencing bilateral tingling extending down her lower limbs to her calves  She also recently began sharp stabbing pain in her right middle back  Pt denies chest pain, abdominal pain, dyspnea, and changes in bladder/bowel habits        HPI    The following portions of the patient's history were reviewed and updated as appropriate: past family history, past medical history, past social history, past surgical history and problem list     Review of Systems Constitutional: Negative  HENT: Positive for dental problem (Periodontal issues)  Multiple dental complaints  But is seeing a periodontist through her insurance provider  Eyes: Negative  Respiratory: Negative  Cardiovascular: Negative  Gastrointestinal: Negative  Endocrine: Negative  Genitourinary: Negative  Musculoskeletal: Positive for back pain (Chronic low back pain)  Allergic/Immunologic: Negative  Neurological: Negative  Psychiatric/Behavioral: Negative  Objective:      /64 (BP Location: Left arm, Patient Position: Sitting, Cuff Size: Standard)   Pulse 65   Temp 98 2 °F (36 8 °C) (Oral)   Ht 5' 4 88" (1 648 m)   Wt 50 8 kg (112 lb)   SpO2 98%   BMI 18 71 kg/m²          Physical Exam   Constitutional: She is oriented to person, place, and time  She appears well-developed and well-nourished  HENT:   Head: Normocephalic and atraumatic  Eyes: Pupils are equal, round, and reactive to light  Conjunctivae and EOM are normal    Neck: Normal range of motion  Neck supple  No JVD present  No tracheal deviation present  No thyromegaly present  Cardiovascular: Normal rate, regular rhythm, normal heart sounds and intact distal pulses  Exam reveals no gallop and no friction rub  No murmur heard  Pulmonary/Chest: Effort normal and breath sounds normal  No stridor  No respiratory distress  She has no wheezes  She has no rales  She exhibits no tenderness  Abdominal: Soft  Bowel sounds are normal  She exhibits no distension  There is no tenderness  Musculoskeletal: She exhibits no edema, tenderness or deformity    -Left paraspinal tenderness with palpable muscle spasm   -Left leg: negative SLR at 45 degrees  -Right leg: negative SLR at 45 degrees  Lymphadenopathy:     She has no cervical adenopathy  Neurological: She is alert and oriented to person, place, and time  Skin: Skin is warm and dry  Capillary refill takes less than 2 seconds  Multiple small round black macular lesions on the thighs and the distal extremities  Psychiatric: She has a normal mood and affect  Her behavior is normal    Vitals reviewed

## 2019-12-02 NOTE — PATIENT INSTRUCTIONS
-Continue with lower back sciatic stretches  Consider physical therapy or personal training to strengthen your core/abdominal muscles in addition to aforementioned stretches    -Take NSAIDs as needed when experiencing lower back stiffness   -Dermatology referral for lesions on the lower extremity

## 2019-12-02 NOTE — ASSESSMENT & PLAN NOTE
Continued use of OTC anti-inflammatories as well as hydrocodone with Tylenol and physical training/exercises

## 2019-12-02 NOTE — ASSESSMENT & PLAN NOTE
Continued exercises for her low back and for strengthening of her abdominal muscles    As needed hydrocodone will be refilled

## 2019-12-02 NOTE — ASSESSMENT & PLAN NOTE
Multiple pigmented lesions of the left lower extremity  These have been present for a number of years    A referral was made to Dermatology to evaluate for possible excision

## 2019-12-26 DIAGNOSIS — G89.29 CHRONIC MIDLINE LOW BACK PAIN WITHOUT SCIATICA: ICD-10-CM

## 2019-12-26 DIAGNOSIS — M54.50 CHRONIC MIDLINE LOW BACK PAIN WITHOUT SCIATICA: ICD-10-CM

## 2019-12-26 RX ORDER — HYDROCODONE BITARTRATE AND ACETAMINOPHEN 7.5; 325 MG/1; MG/1
1 TABLET ORAL EVERY 4 HOURS PRN
Qty: 120 TABLET | Refills: 0 | Status: SHIPPED | OUTPATIENT
Start: 2019-12-26 | End: 2020-02-13 | Stop reason: SDUPTHER

## 2020-02-13 DIAGNOSIS — G89.29 CHRONIC MIDLINE LOW BACK PAIN WITHOUT SCIATICA: ICD-10-CM

## 2020-02-13 DIAGNOSIS — M54.50 CHRONIC MIDLINE LOW BACK PAIN WITHOUT SCIATICA: ICD-10-CM

## 2020-02-13 RX ORDER — HYDROCODONE BITARTRATE AND ACETAMINOPHEN 7.5; 325 MG/1; MG/1
1 TABLET ORAL EVERY 4 HOURS PRN
Qty: 120 TABLET | Refills: 0 | Status: SHIPPED | OUTPATIENT
Start: 2020-02-13 | End: 2020-03-13 | Stop reason: SDUPTHER

## 2020-03-03 ENCOUNTER — OFFICE VISIT (OUTPATIENT)
Dept: INTERNAL MEDICINE CLINIC | Facility: CLINIC | Age: 55
End: 2020-03-03
Payer: COMMERCIAL

## 2020-03-03 VITALS
HEART RATE: 52 BPM | WEIGHT: 113.6 LBS | DIASTOLIC BLOOD PRESSURE: 60 MMHG | OXYGEN SATURATION: 97 % | BODY MASS INDEX: 18.93 KG/M2 | HEIGHT: 65 IN | TEMPERATURE: 98 F | SYSTOLIC BLOOD PRESSURE: 94 MMHG

## 2020-03-03 DIAGNOSIS — M47.817 SPONDYLOSIS OF LUMBOSACRAL REGION WITHOUT MYELOPATHY OR RADICULOPATHY: ICD-10-CM

## 2020-03-03 DIAGNOSIS — G89.29 CHRONIC MIDLINE LOW BACK PAIN WITHOUT SCIATICA: ICD-10-CM

## 2020-03-03 DIAGNOSIS — Z12.11 SCREENING FOR COLON CANCER: Primary | ICD-10-CM

## 2020-03-03 DIAGNOSIS — M54.50 CHRONIC MIDLINE LOW BACK PAIN WITHOUT SCIATICA: ICD-10-CM

## 2020-03-03 PROCEDURE — 1036F TOBACCO NON-USER: CPT | Performed by: INTERNAL MEDICINE

## 2020-03-03 PROCEDURE — 99213 OFFICE O/P EST LOW 20 MIN: CPT | Performed by: INTERNAL MEDICINE

## 2020-03-03 NOTE — PROGRESS NOTES
Assessment/Plan:    Chronic low back pain  Patient needs to begin a comprehensive exercise regimen to strengthen her low back and abdominal muscles  The issue remains largely financial to cover the co-pay is associated with therapy    Arthritis, degenerative  Mild degenerative changes of the lower lumbar spine       Diagnoses and all orders for this visit:    Screening for colon cancer  -     Ambulatory referral to Gastroenterology; Future  -     CBC and Platelet; Future  -     Comprehensive metabolic panel; Future    Chronic midline low back pain without sciatica  -     CBC and Platelet; Future  -     Comprehensive metabolic panel; Future    Spondylosis of lumbosacral region without myelopathy or radiculopathy  -     CBC and Platelet; Future  -     Comprehensive metabolic panel; Future          Subjective:      Patient ID: Erich Fregoso is a 62 y o  female  Patient presents today for follow up  She states she has been doing well but is still dealing with her low back pain and sciatica  Jarad Arnett helps to keep her pain controlled and she also is looking into going to the gym more regularly  She is otherwise in good health  She has not had her colonoscopy yet but plans to get it done soon  She is also waiting to see dermatology about a mole on her left leg        Family History   Problem Relation Age of Onset    Hypertension Mother         Benign Essential    Stroke Mother     Hypertension Father         Benign Essential    Other Maternal Grandfather         Cardiac disorder    Other Other         Malignant neoplasm of stomach    Diabetes type II Other      Social History     Socioeconomic History    Marital status:      Spouse name: Not on file    Number of children: Not on file    Years of education: Not on file    Highest education level: Not on file   Occupational History    Not on file   Social Needs    Financial resource strain: Not on file    Food insecurity:     Worry: Not on file Inability: Not on file    Transportation needs:     Medical: Not on file     Non-medical: Not on file   Tobacco Use    Smoking status: Former Smoker     Types: Cigarettes     Last attempt to quit: 2018     Years since quittin 1    Smokeless tobacco: Never Used    Tobacco comment: vapes flavored juice with nicotene   Substance and Sexual Activity    Alcohol use: Yes     Comment: Rarely consumes alcohol    Drug use: No    Sexual activity: Not on file   Lifestyle    Physical activity:     Days per week: Not on file     Minutes per session: Not on file    Stress: Not on file   Relationships    Social connections:     Talks on phone: Not on file     Gets together: Not on file     Attends Congregation service: Not on file     Active member of club or organization: Not on file     Attends meetings of clubs or organizations: Not on file     Relationship status: Not on file    Intimate partner violence:     Fear of current or ex partner: Not on file     Emotionally abused: Not on file     Physically abused: Not on file     Forced sexual activity: Not on file   Other Topics Concern    Not on file   Social History Narrative    Advance directive in chart    Caffeine Use     Past Medical History:   Diagnosis Date    Carrier of disease     Carrier or suspected carrier of other venereal diseases: Hepatitis B (Previous exposure, cannot donate blood)    Chronic low back pain     Eustachian tube dysfunction     Rotator cuff tendinitis        Current Outpatient Medications:     calcium carbonate-vitamin D (OSCAL-D) 500 mg-200 units per tablet, Take 1 tablet by mouth daily, Disp: , Rfl:     HYDROcodone-acetaminophen (NORCO) 7 5-325 mg per tablet, Take 1 tablet by mouth every 4 (four) hours as needed for painMax Daily Amount: 6 tablets, Disp: 120 tablet, Rfl: 0    ketotifen (ZADITOR) 0 025 % ophthalmic solution, Administer 1 drop to both eyes 2 (two) times a day, Disp: 5 mL, Rfl: 0    multivitamin ([place]) TABS, Take 1 tablet by mouth daily, Disp: , Rfl:     Omega-3 Fatty Acids (FISH OIL) 1200 MG CAPS, Take 1,200 mg by mouth daily  , Disp: , Rfl:     Probiotic CAPS, Take 1 capsule by mouth daily  , Disp: , Rfl:   Allergies   Allergen Reactions    Codeine Nausea Only and Vomiting     Past Surgical History:   Procedure Laterality Date    DENTAL SURGERY      Impacted wisdom teeth    TONSILECTOMY AND ADNOIDECTOMY      TUBAL LIGATION Bilateral          Review of Systems   Constitutional: Negative for fatigue and fever  HENT: Negative for sore throat and trouble swallowing  Eyes: Negative for visual disturbance  Respiratory: Negative for cough and shortness of breath  Cardiovascular: Negative for chest pain, palpitations and leg swelling  Gastrointestinal: Negative for abdominal pain, constipation and diarrhea  Genitourinary: Negative for difficulty urinating  Musculoskeletal: Positive for back pain (chronic low back pain with radiculopathy)  Neurological: Positive for numbness (sciatica)  Negative for weakness and headaches  Objective:      BP 94/60 (BP Location: Left arm, Patient Position: Sitting, Cuff Size: Standard)   Pulse (!) 52   Temp 98 °F (36 7 °C) (Oral)   Ht 5' 4 88" (1 648 m)   Wt 51 5 kg (113 lb 9 6 oz)   SpO2 97%   BMI 18 97 kg/m²          Physical Exam   Constitutional: She is oriented to person, place, and time  No distress  HENT:   Head: Normocephalic and atraumatic  Eyes: EOM are normal    Neck: No JVD present  Cardiovascular: Normal rate and regular rhythm  Pulmonary/Chest: Breath sounds normal  No respiratory distress  Abdominal: Soft  There is no tenderness  There is no rebound and no guarding  Musculoskeletal: She exhibits no edema  Neurological: She is alert and oriented to person, place, and time  Skin: Skin is warm and dry  Capillary refill takes less than 2 seconds

## 2020-03-05 NOTE — ASSESSMENT & PLAN NOTE
Patient needs to begin a comprehensive exercise regimen to strengthen her low back and abdominal muscles    The issue remains largely financial to cover the co-pay is associated with therapy

## 2020-03-13 DIAGNOSIS — G89.29 CHRONIC MIDLINE LOW BACK PAIN WITHOUT SCIATICA: ICD-10-CM

## 2020-03-13 DIAGNOSIS — M54.50 CHRONIC MIDLINE LOW BACK PAIN WITHOUT SCIATICA: ICD-10-CM

## 2020-03-16 RX ORDER — HYDROCODONE BITARTRATE AND ACETAMINOPHEN 7.5; 325 MG/1; MG/1
1 TABLET ORAL EVERY 4 HOURS PRN
Qty: 120 TABLET | Refills: 0 | Status: SHIPPED | OUTPATIENT
Start: 2020-03-16 | End: 2020-04-17 | Stop reason: SDUPTHER

## 2020-04-17 DIAGNOSIS — G89.29 CHRONIC MIDLINE LOW BACK PAIN WITHOUT SCIATICA: ICD-10-CM

## 2020-04-17 DIAGNOSIS — M54.50 CHRONIC MIDLINE LOW BACK PAIN WITHOUT SCIATICA: ICD-10-CM

## 2020-04-17 RX ORDER — HYDROCODONE BITARTRATE AND ACETAMINOPHEN 7.5; 325 MG/1; MG/1
1 TABLET ORAL EVERY 4 HOURS PRN
Qty: 120 TABLET | Refills: 0 | Status: SHIPPED | OUTPATIENT
Start: 2020-04-17 | End: 2020-05-27 | Stop reason: SDUPTHER

## 2020-05-27 DIAGNOSIS — G89.29 CHRONIC MIDLINE LOW BACK PAIN WITHOUT SCIATICA: ICD-10-CM

## 2020-05-27 DIAGNOSIS — M54.50 CHRONIC MIDLINE LOW BACK PAIN WITHOUT SCIATICA: ICD-10-CM

## 2020-05-28 RX ORDER — HYDROCODONE BITARTRATE AND ACETAMINOPHEN 7.5; 325 MG/1; MG/1
1 TABLET ORAL EVERY 4 HOURS PRN
Qty: 120 TABLET | Refills: 0 | Status: SHIPPED | OUTPATIENT
Start: 2020-05-28 | End: 2020-07-06 | Stop reason: SDUPTHER

## 2020-06-09 ENCOUNTER — OFFICE VISIT (OUTPATIENT)
Dept: INTERNAL MEDICINE CLINIC | Facility: CLINIC | Age: 55
End: 2020-06-09
Payer: COMMERCIAL

## 2020-06-09 VITALS
HEIGHT: 65 IN | DIASTOLIC BLOOD PRESSURE: 64 MMHG | HEART RATE: 50 BPM | OXYGEN SATURATION: 97 % | SYSTOLIC BLOOD PRESSURE: 96 MMHG | WEIGHT: 112.4 LBS | BODY MASS INDEX: 18.73 KG/M2 | TEMPERATURE: 98.7 F

## 2020-06-09 DIAGNOSIS — M47.817 SPONDYLOSIS OF LUMBOSACRAL REGION WITHOUT MYELOPATHY OR RADICULOPATHY: Primary | ICD-10-CM

## 2020-06-09 DIAGNOSIS — Z12.11 SCREENING FOR COLON CANCER: ICD-10-CM

## 2020-06-09 DIAGNOSIS — M54.50 CHRONIC MIDLINE LOW BACK PAIN WITHOUT SCIATICA: ICD-10-CM

## 2020-06-09 DIAGNOSIS — G89.29 CHRONIC MIDLINE LOW BACK PAIN WITHOUT SCIATICA: ICD-10-CM

## 2020-06-09 PROCEDURE — 99213 OFFICE O/P EST LOW 20 MIN: CPT | Performed by: INTERNAL MEDICINE

## 2020-06-09 PROCEDURE — 1036F TOBACCO NON-USER: CPT | Performed by: INTERNAL MEDICINE

## 2020-06-09 PROCEDURE — 3008F BODY MASS INDEX DOCD: CPT | Performed by: INTERNAL MEDICINE

## 2020-07-06 DIAGNOSIS — M54.50 CHRONIC MIDLINE LOW BACK PAIN WITHOUT SCIATICA: ICD-10-CM

## 2020-07-06 DIAGNOSIS — G89.29 CHRONIC MIDLINE LOW BACK PAIN WITHOUT SCIATICA: ICD-10-CM

## 2020-07-09 RX ORDER — HYDROCODONE BITARTRATE AND ACETAMINOPHEN 7.5; 325 MG/1; MG/1
1 TABLET ORAL EVERY 4 HOURS PRN
Qty: 120 TABLET | Refills: 0 | Status: SHIPPED | OUTPATIENT
Start: 2020-07-09 | End: 2020-08-13 | Stop reason: SDUPTHER

## 2020-07-13 ENCOUNTER — TELEPHONE (OUTPATIENT)
Dept: INTERNAL MEDICINE CLINIC | Facility: CLINIC | Age: 55
End: 2020-07-13

## 2020-07-13 NOTE — TELEPHONE ENCOUNTER
Left message on machine for patient to call me at [facility]  Pt has pending cologuard order  Does she still have kit? If so, Dr Rylee Garcia would like her to return it within next week  Please let me know if I have to reorder

## 2020-08-13 DIAGNOSIS — G89.29 CHRONIC MIDLINE LOW BACK PAIN WITHOUT SCIATICA: ICD-10-CM

## 2020-08-13 DIAGNOSIS — M54.50 CHRONIC MIDLINE LOW BACK PAIN WITHOUT SCIATICA: ICD-10-CM

## 2020-08-13 RX ORDER — HYDROCODONE BITARTRATE AND ACETAMINOPHEN 7.5; 325 MG/1; MG/1
1 TABLET ORAL EVERY 4 HOURS PRN
Qty: 120 TABLET | Refills: 0 | Status: SHIPPED | OUTPATIENT
Start: 2020-08-13 | End: 2020-09-15 | Stop reason: SDUPTHER

## 2020-08-14 ENCOUNTER — TELEPHONE (OUTPATIENT)
Dept: INTERNAL MEDICINE CLINIC | Facility: CLINIC | Age: 55
End: 2020-08-14

## 2020-08-14 NOTE — TELEPHONE ENCOUNTER
Pharmacy needs a lil more inf on the pt past medical history and if there is any history of Pt or pain management

## 2020-08-27 ENCOUNTER — TELEPHONE (OUTPATIENT)
Dept: INTERNAL MEDICINE CLINIC | Facility: CLINIC | Age: 55
End: 2020-08-27

## 2020-09-15 ENCOUNTER — OFFICE VISIT (OUTPATIENT)
Dept: INTERNAL MEDICINE CLINIC | Facility: CLINIC | Age: 55
End: 2020-09-15
Payer: COMMERCIAL

## 2020-09-15 VITALS
OXYGEN SATURATION: 98 % | TEMPERATURE: 98.4 F | HEIGHT: 65 IN | SYSTOLIC BLOOD PRESSURE: 92 MMHG | DIASTOLIC BLOOD PRESSURE: 62 MMHG | WEIGHT: 114.8 LBS | HEART RATE: 54 BPM | BODY MASS INDEX: 19.13 KG/M2

## 2020-09-15 DIAGNOSIS — M54.50 CHRONIC MIDLINE LOW BACK PAIN WITHOUT SCIATICA: ICD-10-CM

## 2020-09-15 DIAGNOSIS — Z11.59 NEED FOR HEPATITIS C SCREENING TEST: Primary | ICD-10-CM

## 2020-09-15 DIAGNOSIS — G89.29 CHRONIC MIDLINE LOW BACK PAIN WITHOUT SCIATICA: ICD-10-CM

## 2020-09-15 PROCEDURE — 99213 OFFICE O/P EST LOW 20 MIN: CPT | Performed by: INTERNAL MEDICINE

## 2020-09-15 PROCEDURE — 3725F SCREEN DEPRESSION PERFORMED: CPT | Performed by: INTERNAL MEDICINE

## 2020-09-15 PROCEDURE — 1036F TOBACCO NON-USER: CPT | Performed by: INTERNAL MEDICINE

## 2020-09-15 RX ORDER — HYDROCODONE BITARTRATE AND ACETAMINOPHEN 7.5; 325 MG/1; MG/1
1 TABLET ORAL EVERY 4 HOURS PRN
Qty: 120 TABLET | Refills: 0 | Status: SHIPPED | OUTPATIENT
Start: 2020-09-15 | End: 2020-10-19 | Stop reason: SDUPTHER

## 2020-09-15 NOTE — PROGRESS NOTES
Assessment/Plan:    Problem List Items Addressed This Visit        Other    Chronic low back pain     Patient is currently using an inversion table along with her pain medications in an effort to better mitigate her back pain         Relevant Medications    HYDROcodone-acetaminophen (NORCO) 7 5-325 mg per tablet      Other Visit Diagnoses     Need for hepatitis C screening test    -  Primary    Relevant Orders    Hepatitis C antibody        Family History   Problem Relation Age of Onset    Hypertension Mother         Benign Essential    Stroke Mother     Hypertension Father         Benign Essential    Other Maternal Grandfather         Cardiac disorder    Other Other         Malignant neoplasm of stomach    Diabetes type II Other      Social History     Socioeconomic History    Marital status:      Spouse name: Not on file    Number of children: Not on file    Years of education: Not on file    Highest education level: Not on file   Occupational History    Not on file   Social Needs    Financial resource strain: Not on file    Food insecurity     Worry: Not on file     Inability: Not on file    Transportation needs     Medical: Not on file     Non-medical: Not on file   Tobacco Use    Smoking status: Former Smoker     Types: Cigarettes     Last attempt to quit: 2018     Years since quittin 6    Smokeless tobacco: Never Used    Tobacco comment: vapes flavored juice with nicotene   Substance and Sexual Activity    Alcohol use: Yes     Comment: Rarely consumes alcohol    Drug use: No    Sexual activity: Not on file   Lifestyle    Physical activity     Days per week: Not on file     Minutes per session: Not on file    Stress: Not on file   Relationships    Social connections     Talks on phone: Not on file     Gets together: Not on file     Attends Worship service: Not on file     Active member of club or organization: Not on file     Attends meetings of clubs or organizations: Not on file     Relationship status: Not on file    Intimate partner violence     Fear of current or ex partner: Not on file     Emotionally abused: Not on file     Physically abused: Not on file     Forced sexual activity: Not on file   Other Topics Concern    Not on file   Social History Narrative    Advance directive in chart    Caffeine Use     Past Medical History:   Diagnosis Date    Carrier of disease     Carrier or suspected carrier of other venereal diseases: Hepatitis B (Previous exposure, cannot donate blood)    Chronic low back pain     Eustachian tube dysfunction     Rotator cuff tendinitis        Current Outpatient Medications:     calcium carbonate-vitamin D (OSCAL-D) 500 mg-200 units per tablet, Take 1 tablet by mouth daily, Disp: , Rfl:     HYDROcodone-acetaminophen (NORCO) 7 5-325 mg per tablet, Take 1 tablet by mouth every 4 (four) hours as needed for painMax Daily Amount: 6 tablets, Disp: 120 tablet, Rfl: 0    multivitamin (THERAGRAN) TABS, Take 1 tablet by mouth daily, Disp: , Rfl:     Omega-3 Fatty Acids (FISH OIL) 1200 MG CAPS, Take 1,200 mg by mouth daily  , Disp: , Rfl:     Probiotic CAPS, Take 1 capsule by mouth daily  , Disp: , Rfl:     ketotifen (ZADITOR) 0 025 % ophthalmic solution, Administer 1 drop to both eyes 2 (two) times a day (Patient not taking: Reported on 6/9/2020), Disp: 5 mL, Rfl: 0  Allergies   Allergen Reactions    Codeine Nausea Only and Vomiting     Past Surgical History:   Procedure Laterality Date    DENTAL SURGERY      Impacted wisdom teeth    TONSILECTOMY AND ADNOIDECTOMY      TUBAL LIGATION Bilateral          [employer] software was used to dictate this note  It may contain errors with dictating incorrect words or incorrect spelling  Please contact the provider directly with any questions  Subjective:      Patient ID: Kay Singh is a 62 y o  female  HPI patient presents to the office for a follow up on her lower back pain   She has degenerative arthritis and spondylosis of the lumbosacral spine  Last seen 6/9/2020  She is stable and is thinking about getting an inversion table  She has used her mother's with some benefit  She is still working from home and finds she has bad posture, so this seems to be making things worse  She is using norco 7 5-325mg she is using about 3 a day and reports it is effective  She had cologard completed 8/8/20 and it was negative  Review of Systems   Constitutional: Negative for chills, fatigue and fever  HENT: Negative for congestion, sinus pressure, sinus pain, sore throat and trouble swallowing  Eyes: Negative for visual disturbance  Respiratory: Negative for cough, chest tightness and shortness of breath  Cardiovascular: Negative for chest pain, palpitations and leg swelling  Gastrointestinal: Negative for abdominal pain, blood in stool, constipation, diarrhea, nausea and vomiting  Genitourinary: Negative for difficulty urinating, dyspareunia, pelvic pain and vaginal pain  Musculoskeletal: Positive for back pain (chronic; bilateral with sciatic radiation)  Skin: Negative for rash  Neurological: Negative for dizziness, weakness, light-headedness, numbness and headaches  Psychiatric/Behavioral: The patient is not nervous/anxious  All other systems reviewed and are negative          Past Medical History:   Diagnosis Date   [place] Carrier of disease     Carrier or suspected carrier of other venereal diseases: Hepatitis B (Previous exposure, cannot donate blood)    Chronic low back pain     Eustachian tube dysfunction     Rotator cuff tendinitis          Current Outpatient Medications:     calcium carbonate-vitamin D (OSCAL-D) 500 mg-200 units per tablet, Take 1 tablet by mouth daily, Disp: , Rfl:     HYDROcodone-acetaminophen (NORCO) 7 5-325 mg per tablet, Take 1 tablet by mouth every 4 (four) hours as needed for painMax Daily Amount: 6 tablets, Disp: 120 tablet, Rfl: 0   multivitamin (THERAGRAN) TABS, Take 1 tablet by mouth daily, Disp: , Rfl:     Omega-3 Fatty Acids (FISH OIL) 1200 MG CAPS, Take 1,200 mg by mouth daily  , Disp: , Rfl:     Probiotic CAPS, Take 1 capsule by mouth daily  , Disp: , Rfl:     ketotifen (ZADITOR) 0 025 % ophthalmic solution, Administer 1 drop to both eyes 2 (two) times a day (Patient not taking: Reported on 6/9/2020), Disp: 5 mL, Rfl: 0    Allergies   Allergen Reactions    Codeine Nausea Only and Vomiting       Social History   Past Surgical History:   Procedure Laterality Date    DENTAL SURGERY      Impacted wisdom teeth    TONSILECTOMY AND ADNOIDECTOMY      TUBAL LIGATION Bilateral      Family History   Problem Relation Age of Onset    Hypertension Mother         Benign Essential    Stroke Mother     Hypertension Father         Benign Essential    Other Maternal Grandfather         Cardiac disorder    Other Other         Malignant neoplasm of stomach    Diabetes type II Other        Objective:  BP 92/62 (BP Location: Left arm, Patient Position: Sitting, Cuff Size: Standard)   Pulse (!) 54   Temp 98 4 °F (36 9 °C) (Temporal)   Ht 5' 5 12" (1 654 m)   Wt 52 1 kg (114 lb 12 8 oz)   SpO2 98%   BMI 19 03 kg/m²      Physical Exam  Constitutional:       Appearance: Normal appearance  She is normal weight  HENT:      Head: Normocephalic and atraumatic  Right Ear: Tympanic membrane, ear canal and external ear normal       Left Ear: Tympanic membrane, ear canal and external ear normal       Nose: Nose normal       Mouth/Throat:      Mouth: Mucous membranes are moist       Pharynx: Oropharynx is clear  Eyes:      Extraocular Movements: Extraocular movements intact  Conjunctiva/sclera: Conjunctivae normal       Pupils: Pupils are equal, round, and reactive to light  Neck:      Musculoskeletal: Normal range of motion and neck supple  Muscular tenderness present     Cardiovascular:      Rate and Rhythm: Normal rate and regular rhythm  Pulses: Normal pulses  Heart sounds: Normal heart sounds  Pulmonary:      Effort: Pulmonary effort is normal       Breath sounds: Normal breath sounds  Abdominal:      General: Abdomen is flat  Bowel sounds are normal       Palpations: Abdomen is soft  Musculoskeletal: Normal range of motion  General: Tenderness (lumbar spine mid line-right) present  Skin:     General: Skin is warm and dry  Capillary Refill: Capillary refill takes less than 2 seconds  Neurological:      General: No focal deficit present  Mental Status: She is alert and oriented to person, place, and time  Psychiatric:         Mood and Affect: Mood normal          Behavior: Behavior normal          Thought Content:  Thought content normal          Judgment: Judgment normal

## 2020-09-17 NOTE — ASSESSMENT & PLAN NOTE
Patient is currently using an inversion table along with her pain medications in an effort to better mitigate her back pain

## 2020-10-19 DIAGNOSIS — G89.29 CHRONIC MIDLINE LOW BACK PAIN WITHOUT SCIATICA: ICD-10-CM

## 2020-10-19 DIAGNOSIS — M54.50 CHRONIC MIDLINE LOW BACK PAIN WITHOUT SCIATICA: ICD-10-CM

## 2020-10-19 RX ORDER — HYDROCODONE BITARTRATE AND ACETAMINOPHEN 7.5; 325 MG/1; MG/1
1 TABLET ORAL EVERY 4 HOURS PRN
Qty: 120 TABLET | Refills: 0 | Status: SHIPPED | OUTPATIENT
Start: 2020-10-19 | End: 2020-11-23 | Stop reason: SDUPTHER

## 2020-11-23 DIAGNOSIS — M54.50 CHRONIC MIDLINE LOW BACK PAIN WITHOUT SCIATICA: ICD-10-CM

## 2020-11-23 DIAGNOSIS — G89.29 CHRONIC MIDLINE LOW BACK PAIN WITHOUT SCIATICA: ICD-10-CM

## 2020-11-24 RX ORDER — HYDROCODONE BITARTRATE AND ACETAMINOPHEN 7.5; 325 MG/1; MG/1
1 TABLET ORAL EVERY 4 HOURS PRN
Qty: 120 TABLET | Refills: 0 | Status: SHIPPED | OUTPATIENT
Start: 2020-11-24 | End: 2020-12-24 | Stop reason: SDUPTHER

## 2020-11-28 LAB
ALBUMIN SERPL-MCNC: 4.1 G/DL (ref 3.6–5.1)
ALBUMIN/GLOB SERPL: 1.9 (CALC) (ref 1–2.5)
ALP SERPL-CCNC: 45 U/L (ref 37–153)
ALT SERPL-CCNC: 15 U/L (ref 6–29)
AST SERPL-CCNC: 17 U/L (ref 10–35)
BILIRUB SERPL-MCNC: 0.8 MG/DL (ref 0.2–1.2)
BUN SERPL-MCNC: 17 MG/DL (ref 7–25)
BUN/CREAT SERPL: NORMAL (CALC) (ref 6–22)
CALCIUM SERPL-MCNC: 9.3 MG/DL (ref 8.6–10.4)
CHLORIDE SERPL-SCNC: 105 MMOL/L (ref 98–110)
CO2 SERPL-SCNC: 29 MMOL/L (ref 20–32)
CREAT SERPL-MCNC: 0.82 MG/DL (ref 0.5–1.05)
ERYTHROCYTE [DISTWIDTH] IN BLOOD BY AUTOMATED COUNT: 12.4 % (ref 11–15)
GLOBULIN SER CALC-MCNC: 2.2 G/DL (CALC) (ref 1.9–3.7)
GLUCOSE SERPL-MCNC: 85 MG/DL (ref 65–99)
HCT VFR BLD AUTO: 44 % (ref 35–45)
HCV AB S/CO SERPL IA: 0.01
HCV AB SERPL QL IA: NORMAL
HGB BLD-MCNC: 14.3 G/DL (ref 11.7–15.5)
MCH RBC QN AUTO: 31 PG (ref 27–33)
MCHC RBC AUTO-ENTMCNC: 32.5 G/DL (ref 32–36)
MCV RBC AUTO: 95.2 FL (ref 80–100)
PLATELET # BLD AUTO: 272 THOUSAND/UL (ref 140–400)
PMV BLD REES-ECKER: 9.9 FL (ref 7.5–12.5)
POTASSIUM SERPL-SCNC: 4.4 MMOL/L (ref 3.5–5.3)
PROT SERPL-MCNC: 6.3 G/DL (ref 6.1–8.1)
RBC # BLD AUTO: 4.62 MILLION/UL (ref 3.8–5.1)
SL AMB EGFR AFRICAN AMERICAN: 93 ML/MIN/1.73M2
SL AMB EGFR NON AFRICAN AMERICAN: 80 ML/MIN/1.73M2
SODIUM SERPL-SCNC: 141 MMOL/L (ref 135–146)
WBC # BLD AUTO: 5 THOUSAND/UL (ref 3.8–10.8)

## 2020-12-24 DIAGNOSIS — G89.29 CHRONIC MIDLINE LOW BACK PAIN WITHOUT SCIATICA: ICD-10-CM

## 2020-12-24 DIAGNOSIS — M54.50 CHRONIC MIDLINE LOW BACK PAIN WITHOUT SCIATICA: ICD-10-CM

## 2020-12-24 RX ORDER — HYDROCODONE BITARTRATE AND ACETAMINOPHEN 7.5; 325 MG/1; MG/1
1 TABLET ORAL EVERY 4 HOURS PRN
Qty: 120 TABLET | Refills: 0 | Status: SHIPPED | OUTPATIENT
Start: 2020-12-24 | End: 2021-01-22 | Stop reason: SDUPTHER

## 2021-01-18 ENCOUNTER — TELEPHONE (OUTPATIENT)
Dept: INTERNAL MEDICINE CLINIC | Facility: CLINIC | Age: 56
End: 2021-01-18

## 2021-01-22 DIAGNOSIS — M54.50 CHRONIC MIDLINE LOW BACK PAIN WITHOUT SCIATICA: ICD-10-CM

## 2021-01-22 DIAGNOSIS — G89.29 CHRONIC MIDLINE LOW BACK PAIN WITHOUT SCIATICA: ICD-10-CM

## 2021-01-22 RX ORDER — HYDROCODONE BITARTRATE AND ACETAMINOPHEN 7.5; 325 MG/1; MG/1
1 TABLET ORAL EVERY 4 HOURS PRN
Qty: 120 TABLET | Refills: 0 | Status: SHIPPED | OUTPATIENT
Start: 2021-01-22 | End: 2021-02-24 | Stop reason: SDUPTHER

## 2021-01-25 ENCOUNTER — OFFICE VISIT (OUTPATIENT)
Dept: INTERNAL MEDICINE CLINIC | Facility: CLINIC | Age: 56
End: 2021-01-25
Payer: COMMERCIAL

## 2021-01-25 VITALS
HEIGHT: 65 IN | BODY MASS INDEX: 19.13 KG/M2 | DIASTOLIC BLOOD PRESSURE: 66 MMHG | TEMPERATURE: 97.3 F | WEIGHT: 114.8 LBS | HEART RATE: 74 BPM | SYSTOLIC BLOOD PRESSURE: 102 MMHG | OXYGEN SATURATION: 99 %

## 2021-01-25 DIAGNOSIS — G89.29 CHRONIC MIDLINE LOW BACK PAIN WITHOUT SCIATICA: ICD-10-CM

## 2021-01-25 DIAGNOSIS — Z12.31 ENCOUNTER FOR SCREENING MAMMOGRAM FOR MALIGNANT NEOPLASM OF BREAST: Primary | ICD-10-CM

## 2021-01-25 DIAGNOSIS — E55.9 VITAMIN D DEFICIENCY: ICD-10-CM

## 2021-01-25 DIAGNOSIS — M54.50 CHRONIC MIDLINE LOW BACK PAIN WITHOUT SCIATICA: ICD-10-CM

## 2021-01-25 PROCEDURE — 3008F BODY MASS INDEX DOCD: CPT | Performed by: INTERNAL MEDICINE

## 2021-01-25 PROCEDURE — 1036F TOBACCO NON-USER: CPT | Performed by: INTERNAL MEDICINE

## 2021-01-25 PROCEDURE — 99213 OFFICE O/P EST LOW 20 MIN: CPT | Performed by: INTERNAL MEDICINE

## 2021-01-25 NOTE — PROGRESS NOTES
Assessment/Plan:    Vitamin D deficiency  Recommended the patient increase vitamin-D intake to 1000 units twice daily    Chronic low back pain  Continues to utilize Norco for control of back pain but uses it sparingly and has not required any dose adjustment  She has recently purchased an inversion table which has helped significantly in the control of her back pain  Diagnoses and all orders for this visit:    Encounter for screening mammogram for malignant neoplasm of breast  -     Mammo screening bilateral w cad; Future    Vitamin D deficiency    Chronic midline low back pain without sciatica          Subjective:      Patient ID: Darcey Mcburney is a 60 y o  female  Patient presents to the office for follow-up visit for chronic back pain  She also has a history of vitamin-D deficiency is taking vitamin D with calcium supplements  She has purchased an inversion table to help with her chronic back pain  While she still uses the hydrocodone as needed she has not needed to escalate her dose or increase the frequency of use  She tries to use it twice a day at the most   Appetite has remained good she has no other symptoms or concerns other than her back pain  Recent labs from November were reviewed  No significant abnormalities were noted  CBC was within normal limits    CMP was like was was normal   Hepatitis C studies were negative      Family History   Problem Relation Age of Onset    Hypertension Mother         Benign Essential    Stroke Mother     Hypertension Father         Benign Essential    Other Maternal Grandfather         Cardiac disorder    Other Other         Malignant neoplasm of stomach    Diabetes type II Other      Social History     Socioeconomic History    Marital status:      Spouse name: Not on file    Number of children: Not on file    Years of education: Not on file    Highest education level: Not on file   Occupational History    Not on file   Social Needs    Financial resource strain: Not on file    Food insecurity     Worry: Not on file     Inability: Not on file    Transportation needs     Medical: Not on file     Non-medical: Not on file   Tobacco Use    Smoking status: Former Smoker     Types: Cigarettes     Quit date: 1/12/2018     Years since quitting: 3 0    Smokeless tobacco: Never Used    Tobacco comment: vapes flavored juice with nicotine   Substance and Sexual Activity    Alcohol use: Not Currently     Comment: Rarely consumes alcohol    Drug use: No    Sexual activity: Not on file   Lifestyle    Physical activity     Days per week: Not on file     Minutes per session: Not on file    Stress: Not on file   Relationships    Social connections     Talks on phone: Not on file     Gets together: Not on file     Attends Yarsanism service: Not on file     Active member of club or organization: Not on file     Attends meetings of clubs or organizations: Not on file     Relationship status: Not on file    Intimate partner violence     Fear of current or ex partner: Not on file     Emotionally abused: Not on file     Physically abused: Not on file     Forced sexual activity: Not on file   Other Topics Concern    Not on file   Social History Narrative    Advance directive in chart    Caffeine Use     Past Medical History:   Diagnosis Date    Carrier of disease     Carrier or suspected carrier of other venereal diseases: Hepatitis B (Previous exposure, cannot donate blood)    Chronic low back pain     Eustachian tube dysfunction     Rotator cuff tendinitis        Current Outpatient Medications:     HYDROcodone-acetaminophen (NORCO) 7 5-325 mg per tablet, Take 1 tablet by mouth every 4 (four) hours as needed for painMax Daily Amount: 6 tablets, Disp: 120 tablet, Rfl: 0    multivitamin (THERAGRAN) TABS, Take 1 tablet by mouth daily, Disp: , Rfl:     Probiotic CAPS, Take 1 capsule by mouth daily  , Disp: , Rfl:     calcium carbonate-vitamin D (OSCAL-D) 500 mg-200 units per tablet, Take 1 tablet by mouth daily, Disp: , Rfl:   Allergies   Allergen Reactions    Codeine Nausea Only and Vomiting     Past Surgical History:   Procedure Laterality Date    DENTAL SURGERY      Impacted wisdom teeth    TONSILECTOMY AND ADNOIDECTOMY      TUBAL LIGATION Bilateral          Review of Systems   Constitutional: Negative  HENT: Negative  Eyes: Negative  Respiratory: Negative  Cardiovascular: Negative  Gastrointestinal: Negative  Endocrine: Negative  Genitourinary: Negative  Musculoskeletal: Positive for back pain  Skin: Negative  Neurological: Negative  Hematological: Negative  Psychiatric/Behavioral: Negative  Objective:      /66 (BP Location: Left arm, Patient Position: Sitting, Cuff Size: Standard)   Pulse 74   Temp (!) 97 3 °F (36 3 °C) (Tympanic)   Ht 5' 5 43" (1 662 m)   Wt 52 1 kg (114 lb 12 8 oz)   SpO2 99%   BMI 18 85 kg/m²          Physical Exam  Vitals signs reviewed  Constitutional:       General: She is not in acute distress  Appearance: Normal appearance  She is normal weight  She is not ill-appearing, toxic-appearing or diaphoretic  HENT:      Head: Normocephalic and atraumatic  Right Ear: External ear normal       Left Ear: External ear normal    Eyes:      Conjunctiva/sclera: Conjunctivae normal       Pupils: Pupils are equal, round, and reactive to light  Neck:      Musculoskeletal: Neck supple  No muscular tenderness  Vascular: No carotid bruit  Cardiovascular:      Rate and Rhythm: Normal rate and regular rhythm  Pulses: Normal pulses  Heart sounds: Normal heart sounds  Pulmonary:      Effort: Pulmonary effort is normal  No respiratory distress  Breath sounds: Normal breath sounds  Abdominal:      General: Abdomen is flat  There is no distension  Tenderness: There is no abdominal tenderness  Musculoskeletal:         General: No swelling        Right lower leg: No edema  Left lower leg: No edema  Skin:     General: Skin is warm  Coloration: Skin is not jaundiced  Findings: No erythema or rash  Neurological:      General: No focal deficit present  Mental Status: She is alert and oriented to person, place, and time  Mental status is at baseline  Psychiatric:         Mood and Affect: Mood normal          Behavior: Behavior normal          Thought Content:  Thought content normal          Judgment: Judgment normal

## 2021-01-26 NOTE — ASSESSMENT & PLAN NOTE
Continues to utilize [address] [facility] for control of back pain but uses it sparingly and has not required any dose adjustment  She has recently purchased an inversion table which has helped significantly in the control of her back pain

## 2021-02-24 DIAGNOSIS — M54.50 CHRONIC MIDLINE LOW BACK PAIN WITHOUT SCIATICA: ICD-10-CM

## 2021-02-24 DIAGNOSIS — G89.29 CHRONIC MIDLINE LOW BACK PAIN WITHOUT SCIATICA: ICD-10-CM

## 2021-02-25 RX ORDER — HYDROCODONE BITARTRATE AND ACETAMINOPHEN 7.5; 325 MG/1; MG/1
1 TABLET ORAL EVERY 4 HOURS PRN
Qty: 120 TABLET | Refills: 0 | Status: SHIPPED | OUTPATIENT
Start: 2021-02-25 | End: 2021-03-30 | Stop reason: SDUPTHER

## 2021-03-05 ENCOUNTER — TRANSCRIBE ORDERS (OUTPATIENT)
Dept: ADMINISTRATIVE | Facility: HOSPITAL | Age: 56
End: 2021-03-05

## 2021-03-05 DIAGNOSIS — Z12.31 OTHER SCREENING MAMMOGRAM: Primary | ICD-10-CM

## 2021-03-11 ENCOUNTER — HOSPITAL ENCOUNTER (OUTPATIENT)
Dept: RADIOLOGY | Age: 56
Discharge: HOME | End: 2021-03-11
Payer: COMMERCIAL

## 2021-03-11 VITALS — WEIGHT: 112 LBS | HEIGHT: 65 IN | BODY MASS INDEX: 18.66 KG/M2

## 2021-03-11 DIAGNOSIS — Z12.31 OTHER SCREENING MAMMOGRAM: ICD-10-CM

## 2021-03-11 PROCEDURE — 77067 SCR MAMMO BI INCL CAD: CPT

## 2021-03-11 PROCEDURE — 77063 BREAST TOMOSYNTHESIS BI: CPT

## 2021-03-30 DIAGNOSIS — M54.50 CHRONIC MIDLINE LOW BACK PAIN WITHOUT SCIATICA: ICD-10-CM

## 2021-03-30 DIAGNOSIS — G89.29 CHRONIC MIDLINE LOW BACK PAIN WITHOUT SCIATICA: ICD-10-CM

## 2021-03-30 RX ORDER — HYDROCODONE BITARTRATE AND ACETAMINOPHEN 7.5; 325 MG/1; MG/1
1 TABLET ORAL EVERY 4 HOURS PRN
Qty: 120 TABLET | Refills: 0 | Status: SHIPPED | OUTPATIENT
Start: 2021-03-30 | End: 2021-04-30 | Stop reason: SDUPTHER

## 2021-04-09 DIAGNOSIS — Z23 ENCOUNTER FOR IMMUNIZATION: ICD-10-CM

## 2021-04-14 ENCOUNTER — ANNUAL EXAM (OUTPATIENT)
Dept: OBGYN CLINIC | Facility: CLINIC | Age: 56
End: 2021-04-14
Payer: COMMERCIAL

## 2021-04-14 VITALS
BODY MASS INDEX: 19.66 KG/M2 | DIASTOLIC BLOOD PRESSURE: 60 MMHG | WEIGHT: 118 LBS | HEIGHT: 65 IN | SYSTOLIC BLOOD PRESSURE: 104 MMHG

## 2021-04-14 DIAGNOSIS — Z01.419 ENCOUNTER FOR ANNUAL ROUTINE GYNECOLOGICAL EXAMINATION: Primary | ICD-10-CM

## 2021-04-14 DIAGNOSIS — Z12.31 ENCOUNTER FOR SCREENING MAMMOGRAM FOR BREAST CANCER: ICD-10-CM

## 2021-04-14 DIAGNOSIS — N95.0 POSTMENOPAUSAL BLEEDING: ICD-10-CM

## 2021-04-14 PROCEDURE — S0612 ANNUAL GYNECOLOGICAL EXAMINA: HCPCS | Performed by: OBSTETRICS & GYNECOLOGY

## 2021-04-14 NOTE — PROGRESS NOTES
Assessment/Plan:    Pap smear done for cytology, reflex HPV  Encouraged self-breast examination as well as calcium supplementation  Continue annual mammogram   Will obtain pelvic ultrasound, assess endometrial stripe  She is aware if this is abnormal I would recommend endometrial biopsy  Reviewed over-the-counter agents to treat symptomatic vasomotor symptoms  Patient will return to office with pending follow-up  All questions answered at this time  No problem-specific Assessment & Plan notes found for this encounter  Diagnoses and all orders for this visit:    Encounter for annual routine gynecological examination  -     Thinprep Tis Pap Reflex HPV mRNA E6/E7    Encounter for screening mammogram for breast cancer  -     Mammo screening bilateral w 3d & cad; Future    Postmenopausal bleeding  -     US pelvis complete w transvaginal; Future    Other orders  -     Zinc Sulfate (ZINC 15 PO); Take by mouth          Subjective:      Patient ID: Maursiio Waterman is a 60 y o  female  HPI       This is a 64-year-old female  ( x2, age 26, 30) presents for annual gyn exam   Patient went through menopause at age 44  She has never been on hormone replacement therapy  Over the course of the year, she has had 3 episodes of scant pink vaginal staining  She has noticed increase in hot flashes and night sweats  There has been no changes in bowel or bladder function  Patient is not sexually active over the last 6 years  She has been dealing with chronic back pain, managed conservatively  She does follow up with her family physician on a regular basis    Her colon cancer screening, Cologuard 2020 normal     The following portions of the patient's history were reviewed and updated as appropriate: allergies, current medications, past family history, past medical history, past social history, past surgical history and problem list     Review of Systems   Constitutional: Negative for fatigue, fever and unexpected weight change  Respiratory: Negative for cough, chest tightness, shortness of breath and wheezing  Cardiovascular: Negative  Negative for chest pain and palpitations  Gastrointestinal: Negative  Negative for abdominal distention, abdominal pain, blood in stool, constipation, diarrhea, nausea and vomiting  Genitourinary: Negative  Negative for difficulty urinating, dyspareunia, dysuria, flank pain, frequency, genital sores, hematuria, pelvic pain, urgency, vaginal bleeding, vaginal discharge and vaginal pain  Skin: Negative for rash  Objective:      /60   Ht 5' 5" (1 651 m)   Wt 53 5 kg (118 lb)   BMI 19 64 kg/m²          Physical Exam  Constitutional:       Appearance: Normal appearance  She is well-developed  Cardiovascular:      Rate and Rhythm: Normal rate and regular rhythm  Pulmonary:      Effort: Pulmonary effort is normal       Breath sounds: Normal breath sounds  Chest:      Breasts:         Right: No inverted nipple, mass, nipple discharge, skin change or tenderness  Left: No inverted nipple, mass, nipple discharge, skin change or tenderness  Abdominal:      General: Bowel sounds are normal  There is no distension  Palpations: Abdomen is soft  Tenderness: There is no abdominal tenderness  There is no guarding or rebound  Genitourinary:     Labia:         Right: No rash, tenderness or lesion  Left: No rash, tenderness or lesion  Vagina: Normal  No signs of injury  No vaginal discharge, tenderness or lesions  Cervix: No cervical motion tenderness, discharge, friability, lesion, erythema or cervical bleeding  Uterus: Not enlarged and not tender  Adnexa:         Right: No mass, tenderness or fullness  Left: No mass, tenderness or fullness  Comments:   External genitalia is evident of significant atrophy  The vagina is evident of estrogen deficiency  She has mild pelvic floor prolapse  Rectovaginal exam is confirmatory  Skin:     General: Skin is warm and dry  Neurological:      Mental Status: She is alert and oriented to person, place, and time

## 2021-04-16 LAB
CLINICAL INFO: NORMAL
CYTO CVX: NORMAL
CYTOLOGY CMNT CVX/VAG CYTO-IMP: NORMAL
DATE PREVIOUS BX: NORMAL
LMP START DATE: NORMAL
SL AMB PREV. PAP:: NORMAL
SPECIMEN SOURCE CVX/VAG CYTO: NORMAL

## 2021-04-19 ENCOUNTER — HOSPITAL ENCOUNTER (OUTPATIENT)
Dept: RADIOLOGY | Facility: HOSPITAL | Age: 56
Discharge: HOME | End: 2021-04-19
Attending: OBSTETRICS & GYNECOLOGY
Payer: COMMERCIAL

## 2021-04-19 DIAGNOSIS — N95.0 POSTMENOPAUSAL BLEEDING: ICD-10-CM

## 2021-04-19 PROCEDURE — 76830 TRANSVAGINAL US NON-OB: CPT

## 2021-04-19 PROCEDURE — 76856 US EXAM PELVIC COMPLETE: CPT

## 2021-04-26 ENCOUNTER — TELEPHONE (OUTPATIENT)
Dept: OBGYN CLINIC | Facility: CLINIC | Age: 56
End: 2021-04-26

## 2021-04-26 NOTE — TELEPHONE ENCOUNTER
----- Message from Crystal Veras DO sent at 4/25/2021  5:17 PM EDT -----  Inform pt pelvic US reveals normal endometrial stripe/uterus   Resume annual gyn exams and call if any further bleeding/spotting

## 2021-04-29 ENCOUNTER — CONSULT (OUTPATIENT)
Dept: DERMATOLOGY | Facility: CLINIC | Age: 56
End: 2021-04-29
Payer: COMMERCIAL

## 2021-04-29 VITALS — WEIGHT: 112 LBS | TEMPERATURE: 97.9 F | HEIGHT: 65 IN | BODY MASS INDEX: 18.66 KG/M2

## 2021-04-29 DIAGNOSIS — D48.9 NEOPLASM OF UNCERTAIN BEHAVIOR: Primary | ICD-10-CM

## 2021-04-29 DIAGNOSIS — D23.9 DERMATOFIBROMA: ICD-10-CM

## 2021-04-29 PROCEDURE — 88342 IMHCHEM/IMCYTCHM 1ST ANTB: CPT | Performed by: STUDENT IN AN ORGANIZED HEALTH CARE EDUCATION/TRAINING PROGRAM

## 2021-04-29 PROCEDURE — 88341 IMHCHEM/IMCYTCHM EA ADD ANTB: CPT | Performed by: STUDENT IN AN ORGANIZED HEALTH CARE EDUCATION/TRAINING PROGRAM

## 2021-04-29 PROCEDURE — 99204 OFFICE O/P NEW MOD 45 MIN: CPT | Performed by: DERMATOLOGY

## 2021-04-29 PROCEDURE — 88305 TISSUE EXAM BY PATHOLOGIST: CPT | Performed by: STUDENT IN AN ORGANIZED HEALTH CARE EDUCATION/TRAINING PROGRAM

## 2021-04-29 PROCEDURE — 11104 PUNCH BX SKIN SINGLE LESION: CPT | Performed by: DERMATOLOGY

## 2021-04-29 NOTE — PATIENT INSTRUCTIONS
Plan:  1  Instructed to keep the wound dry and covered for 24-48h and clean thereafter  2  Warning signs of infection were reviewed  3  Recommended that the patient use acetaminophen as needed for pain  4   Sutures if any should be removed in 10-14 days

## 2021-04-29 NOTE — PROGRESS NOTES
[facility] Note     Patient Name: Goyo Sanchez  Encounter Date: 4/29/2021     Have you been cared for by a [facility] Dermatologist in the last 3 years and, if so, which one? No    · Have you traveled outside of the [address] [place] [address] in the past 3 months or outside of the [facility] area in the last 2 weeks? No     May we call your Preferred Phone number to discuss your specific medical information? Yes     May we leave a detailed message that includes your specific medical information? Yes      Today's Chief Concerns:   Concern #1:  Spots on leg    Past Medical History:  Have you personally ever had or currently have any of the following? · Skin cancer (such as Melanoma, Basal Cell Carcinoma, Squamous Cell Carcinoma? (If Yes, please provide more detail)- No  · Eczema: No  · Psoriasis: No  · HIV/AIDS: No  · Hepatitis B or C: YES - Hepatitis B  · Tuberculosis: No  · Systemic Immunosuppression such as Diabetes, Biologic or Immunotherapy, Chemotherapy, Organ Transplantation, Bone Marrow Transplantation (If YES, please provide more detail): No  · Radiation Treatment (If YES, please provide more detail): No  · Any other major medical conditions/concerns? (If Yes, which types)- No      Family History:  Have any of your "first degree relatives" (parent, brother, sister, or child) had any of the following       · Skin cancer such as Melanoma or Merkel Cell Carcinoma or Pancreatic Cancer? No  · Eczema, Asthma, Hay Fever or Seasonal Allergies: YES, Daughter has Asthma  · Psoriasis or Psoriatic Arthritis: No  · Do any other medical conditions seem to run in your family? If Yes, what condition and which relatives?   No    Current Medications:   (please update all dermatological medications before printing patient's AVS!)      Current Outpatient Medications:     calcium carbonate-vitamin D (OSCAL-D) 500 mg-200 units per tablet, Take 1 tablet by mouth daily, Disp: , Rfl:   HYDROcodone-acetaminophen (NORCO) 7 5-325 mg per tablet, Take 1 tablet by mouth every 4 (four) hours as needed for painMax Daily Amount: 6 tablets, Disp: 120 tablet, Rfl: 0    multivitamin (THERAGRAN) TABS, Take 1 tablet by mouth daily, Disp: , Rfl:     Probiotic CAPS, Take 1 capsule by mouth daily  , Disp: , Rfl:     Zinc Sulfate (ZINC 15 PO), Take by mouth, Disp: , Rfl:       Review of Systems:  Have you recently had or currently have any of the following? If YES, what are you doing for the problem? · Fever, chills or unintended weight loss: No  · Sudden loss or change in your vision: No  · Nausea, vomiting or blood in your stool: No  · Painful or swollen joints: No  · Wheezing or cough: No  · Changing mole or non-healing wound: No  · Nosebleeds: No  · Excessive sweating: YES, Pre menopause   · Easy or prolonged bleeding? No  · Over the last 2 weeks, how often have you been bothered by the following problems? · Taking little interest or pleasure in doing things: 1 - Not at All  · Feeling down, depressed, or hopeless: 1 - Not at All  · Rapid heartbeat with epinephrine:  No    · FEMALES ONLY:    · Are you pregnant or planning to become pregnant? No  · Are you currently or planning to be nursing or breast feeding? No    · Any known allergies? Allergies   Allergen Reactions    Codeine Nausea Only and Vomiting   ·       Physical Exam:     Was a chaperone (Derm Clinical Assistant) present throughout the entire Physical Exam? Yes     Did the Dermatology Team specifically  the patient on the importance of a Full Skin Exam to be sure that nothing is missed clinically?  Yes}  o Did the patient ultimately request or accept a Full Skin Exam?  NO  o Did the patient specifically refuse to have the areas "under-the-bra" examined by the Dermatologist? No  o Did the patient specifically refuse to have the areas "under-the-underwear" examined by the Dermatologist? No    CONSTITUTIONAL:   There were no vitals filed for this visit  PSYCH: Normal mood and affect  EYES: Normal conjunctiva  ENT: Normal lips and oral mucosa  CARDIOVASCULAR: No edema  RESPIRATORY: Normal respirations  HEME/LYMPH/IMMUNO:  No regional lymphadenopathy except as noted below in "ASSESSMENT AND PLAN BY DIAGNOSIS"    SKIN:  FULL ORGAN SYSTEM EXAM   Left Leg, Foot, Toes Normal except as noted below in Assessment        Assessment and Plan by Diagnosis:    History of Present Condition:     Duration:  How long has this been an issue for you?    o  26 years   Location Affected:  Where on the body is this affecting you? o  Left leg   Quality:  Is there any bleeding, pain, itch, burning/irritation, or redness associated with the skin lesion?    o  Denies   Severity:  Describe any bleeding, pain, itch, burning/irritation, or redness on a scale of 1 to 10 (with 10 being the worst)  o  Denies   Timing:  Does this condition seem to be there pretty constantly or do you notice it more at specific times throughout the day?    o  Constant   Context:  Have you ever noticed that this condition seems to be associated with specific activities you do?    o  Denies   Modifying Factors:    o Anything that seems to make the condition worse?    -  Denies  o What have you tried to do to make the condition better? -  Denies   Associated Signs and Symptoms:  Does this skin lesion seem to be associated with any of the following:  o  SL AMB DERM SIGNS AND SYMPTOMS: Skin color changes       1  NEOPLASM OF UNCERTAIN BEHAVIOR OF SKIN    Physical Exam:   (Anatomic Location); (Size and Morphological Description); (Differential Diagnosis):  o Left calf; with 0 8 cm firm pink to brown papule;Differential diagnosis; Dermatofibroma; Rule out Squamous cell carcinoma       Additional History of Present Condition:  Present for at least 26 years  Patient denies using any creams or products for the spots   Denies any bleeding, pain, itching, burning, irritation or redness  Assessment and Plan:   I have discussed with the patient that a sample of skin via a "skin biopsy would be potentially helpful to further make a specific diagnosis under the microscope   Based on a thorough discussion of this condition and the management approach to it (including a comprehensive discussion of the known risks, side effects and potential benefits of treatment), the patient (family) agrees to implement the following specific plan:    o Procedure:  Skin Biopsy  After a thorough discussion of treatment options and risk/benefits/alternatives (including but not limited to local pain, scarring, dyspigmentation, blistering, possible superinfection, and inability to confirm a diagnosis via histopathology), verbal and written consent were obtained and portion of the rash was biopsied for tissue sample  See below for consent that was obtained from patient and subsequent Procedure Note  PROCEDURE NOTE:  PUNCH BIOPSY      Performing Physician: Dr Delgadillo    Anatomic Location; Clinical Description with size (cm); Pre-Op Diagnosis:   o  Left calf; with 0 8 cm firm pink to brown papule;Differential diagnosis; Dermatofibroma; Rule out Squamous cell carcinoma           Anesthesia: 1% xylocaine with epi       Topical anesthesia: None       Indications: To indicate diagnosis and management plan  Procedure Details     Patient informed of the risks (including bleeding,scaring and infection) and benefits of the procedure explained  Verbal and written informed consent obtained  The area was prepped and draped in the usual fashion  Anesthesia was obtained with 1% lidocaine with epinephrine  The skin was then stretched perpendicular to the skin tension lines and a punch biopsy to an appropriate sampling depth was obtained with a 5 mm punch with a forceps and iris scissors  Hemostasis was obtained with 4-0 Ethilon x 5 sutures       Complications:  None      Specimen has been sent for review by Dermatopathology  Plan:  1  Instructed to keep the wound dry and covered for 24-48h and clean thereafter  2  Warning signs of infection were reviewed  3  Recommended that the patient use acetaminophen as needed for pain  4  Sutures if any should be removed in 10-14 days      Standard post-procedure care has been explained and has been included in written form within the patient's copy of Informed Consent  DERMATOFIBROMA  Physical Exam:   Anatomic Location Affected:  Left leg   Morphological Description:  3 firm brown papules     Additional History of Present Condition:  Denies pain, itch, bleeding  No treatments tried         Assessment and Plan:  Based on a thorough discussion of this condition and the management approach to it (including a comprehensive discussion of the known risks, side effects and potential benefits of treatment), the patient (family) agrees to implement the following specific plan:  Benign, reassurance provided to patient   No features concerning for malignancy on both clinical and dermatoscopic exam today   Scribe Attestation    I,:  Oj Luke am acting as a scribe while in the presence of the attending physician :       I,:  Brandy Lacey MD personally performed the services described in this documentation    as scribed in my presence :          

## 2021-04-30 DIAGNOSIS — G89.29 CHRONIC MIDLINE LOW BACK PAIN WITHOUT SCIATICA: ICD-10-CM

## 2021-04-30 DIAGNOSIS — M54.50 CHRONIC MIDLINE LOW BACK PAIN WITHOUT SCIATICA: ICD-10-CM

## 2021-05-01 RX ORDER — HYDROCODONE BITARTRATE AND ACETAMINOPHEN 7.5; 325 MG/1; MG/1
1 TABLET ORAL EVERY 4 HOURS PRN
Qty: 120 TABLET | Refills: 0 | Status: SHIPPED | OUTPATIENT
Start: 2021-05-01 | End: 2021-06-07 | Stop reason: SDUPTHER

## 2021-05-06 ENCOUNTER — OFFICE VISIT (OUTPATIENT)
Dept: INTERNAL MEDICINE CLINIC | Facility: CLINIC | Age: 56
End: 2021-05-06
Payer: COMMERCIAL

## 2021-05-06 VITALS
SYSTOLIC BLOOD PRESSURE: 110 MMHG | HEART RATE: 62 BPM | HEIGHT: 65 IN | WEIGHT: 117.2 LBS | BODY MASS INDEX: 19.53 KG/M2 | DIASTOLIC BLOOD PRESSURE: 60 MMHG | TEMPERATURE: 97.6 F | OXYGEN SATURATION: 98 %

## 2021-05-06 DIAGNOSIS — L81.9 PIGMENTED SKIN LESIONS: ICD-10-CM

## 2021-05-06 DIAGNOSIS — M54.50 CHRONIC MIDLINE LOW BACK PAIN WITHOUT SCIATICA: Primary | ICD-10-CM

## 2021-05-06 DIAGNOSIS — G89.29 CHRONIC MIDLINE LOW BACK PAIN WITHOUT SCIATICA: Primary | ICD-10-CM

## 2021-05-06 PROCEDURE — 1036F TOBACCO NON-USER: CPT | Performed by: INTERNAL MEDICINE

## 2021-05-06 PROCEDURE — 99213 OFFICE O/P EST LOW 20 MIN: CPT | Performed by: INTERNAL MEDICINE

## 2021-05-06 PROCEDURE — 3008F BODY MASS INDEX DOCD: CPT | Performed by: INTERNAL MEDICINE

## 2021-05-06 NOTE — PROGRESS NOTES
Assessment/Plan:    Chronic low back pain   Patient is doing better now that she has resumed her Pilates and she purchased a type of inversion table which has helped back pain    Pigmented skin lesions   Patient had a biopsy of the lesion on her left calf pathology revealed an aneurysmal fibrous histiocytoma       Diagnoses and all orders for this visit:    Chronic midline low back pain without sciatica    Pigmented skin lesions          Subjective:      Patient ID: Clarke Amador is a 60 y o  female  Patient is seen for follow-up visit  She is doing much better as far as her low back pain is concerned  She has been able to resume her Pilates and she has purchased a form of an inversion table  Which has helped significantly  She has even gained back a little bit of her height  She had the lesion removed from her left calf which had been bothering her  Pathology returned as an aneurysmal fibrous histiocytoma and note was made of focal extension into the subcutis  She has a follow-up with dermatology for removal of the sutures next week         Family History   Problem Relation Age of Onset    Hypertension Mother         Benign Essential    Stroke Mother     Hypertension Father         Benign Essential    Other Maternal Grandfather         Cardiac disorder    Other Other         Malignant neoplasm of stomach    Diabetes type II Other     No Known Problems Sister     No Known Problems Daughter     Stomach cancer Maternal Grandmother     No Known Problems Paternal Grandmother     No Known Problems Paternal Grandfather     No Known Problems Paternal Aunt      Social History     Socioeconomic History    Marital status:      Spouse name: Not on file    Number of children: Not on file    Years of education: Not on file    Highest education level: Not on file   Occupational History    Not on file   Social Needs    Financial resource strain: Not on file    Food insecurity     Worry: Not on file     Inability: Not on file    Transportation needs     Medical: Not on file     Non-medical: Not on file   Tobacco Use    Smoking status: Former Smoker     Types: Cigarettes     Quit date: 1/12/2018     Years since quitting: 3 3    Smokeless tobacco: Never Used    Tobacco comment: vapes flavored juice with nicotine   Substance and Sexual Activity    Alcohol use: Not Currently     Comment: Rarely consumes alcohol    Drug use: No    Sexual activity: Not Currently     Partners: Male     Birth control/protection: Female Sterilization   Lifestyle    Physical activity     Days per week: Not on file     Minutes per session: Not on file    Stress: Not on file   Relationships    Social connections     Talks on phone: Not on file     Gets together: Not on file     Attends [employer] service: Not on file     Active member of club or organization: Not on file     Attends meetings of clubs or organizations: Not on file     Relationship status: Not on file    Intimate partner violence     Fear of current or ex partner: Not on file     Emotionally abused: Not on file     Physically abused: Not on file     Forced sexual activity: Not on file   Other Topics Concern    Not on file   Social History Narrative    Advance directive in chart    Caffeine Use     Past Medical History:   Diagnosis Date    Carrier of disease     Carrier or suspected carrier of other venereal diseases: Hepatitis B (Previous exposure, cannot donate blood)    Chronic low back pain     Eustachian tube dysfunction     Rotator cuff tendinitis        Current Outpatient Medications:     calcium carbonate-vitamin D (OSCAL-D) 500 mg-200 units per tablet, Take 1 tablet by mouth daily, Disp: , Rfl:     HYDROcodone-acetaminophen (NORCO) 7 5-325 mg per tablet, Take 1 tablet by mouth every 4 (four) hours as needed for painMax Daily Amount: 6 tablets, Disp: 120 tablet, Rfl: 0    multivitamin (THERAGRAN) TABS, Take 1 tablet by mouth daily, Disp: , Rfl:   Probiotic CAPS, Take 1 capsule by mouth daily  , Disp: , Rfl:     Zinc Sulfate (ZINC 15 PO), Take by mouth, Disp: , Rfl:   Allergies   Allergen Reactions    Codeine Nausea Only and Vomiting     Past Surgical History:   Procedure Laterality Date    DENTAL SURGERY      Impacted wisdom teeth    TONSILECTOMY AND ADNOIDECTOMY      TUBAL LIGATION Bilateral          Review of Systems   Constitutional: Negative  HENT: Negative  Eyes: Negative  Respiratory: Negative  Cardiovascular: Negative  Gastrointestinal: Negative  Endocrine: Negative  Genitourinary: Negative  Musculoskeletal: Negative  Skin: Positive for wound (Mildly irritated suture line the posterior left calf  )  Allergic/Immunologic: Negative  Neurological: Negative  Hematological: Negative  Psychiatric/Behavioral: Negative  Objective:      /60   Pulse 62   Temp 97 6 °F (36 4 °C) (Tympanic)   Ht 5' 4 69" (1 643 m)   Wt 53 2 kg (117 lb 3 2 oz)   SpO2 98%   BMI 19 69 kg/m²          Physical Exam  Vitals signs reviewed  Constitutional:       General: She is not in acute distress  Appearance: Normal appearance  She is normal weight  She is not ill-appearing, toxic-appearing or diaphoretic  HENT:      Head: Normocephalic and atraumatic  Nose: Nose normal    Eyes:      General: No scleral icterus  Conjunctiva/sclera: Conjunctivae normal       Pupils: Pupils are equal, round, and reactive to light  Neck:      Musculoskeletal: Neck supple  Cardiovascular:      Rate and Rhythm: Normal rate and regular rhythm  Pulmonary:      Effort: Pulmonary effort is normal  No respiratory distress  Breath sounds: Normal breath sounds  Abdominal:      General: Abdomen is flat  There is no distension  Musculoskeletal: Normal range of motion  General: No swelling or tenderness  Right lower leg: No edema  Left lower leg: No edema  Skin:     General: Skin is warm  Coloration: Skin is not jaundiced  Findings: No bruising, erythema or rash  Neurological:      General: No focal deficit present  Mental Status: She is alert and oriented to person, place, and time  Mental status is at baseline  Psychiatric:         Mood and Affect: Mood normal          Behavior: Behavior normal          Thought Content:  Thought content normal          Judgment: Judgment normal

## 2021-05-06 NOTE — ASSESSMENT & PLAN NOTE
Patient had a biopsy of the lesion on her left calf pathology revealed an aneurysmal fibrous histiocytoma

## 2021-05-06 NOTE — ASSESSMENT & PLAN NOTE
Patient is doing better now that she has resumed her Pilates and she purchased a type of inversion table which has helped back pain

## 2021-05-07 ENCOUNTER — TELEPHONE (OUTPATIENT)
Dept: DERMATOLOGY | Facility: CLINIC | Age: 56
End: 2021-05-07

## 2021-05-07 NOTE — RESULT ENCOUNTER NOTE
Telephone call to patient, In regards scheduling Excision  Left voice message to patient to please return my call at there earliest convenience

## 2021-05-07 NOTE — TELEPHONE ENCOUNTER
Telephone call from  patient in regards scheduling Excision  Pt refused to scheduled at this moment  Per pt will scheduled if it comes back  Pt aware of our recommendation  skin cancer gets treated in a timely fashion  The risks of incomplete treatment include continued local growth of the skin cancer, further destruction of local tissue, metastasis of the skin cancer to other areas in the body and rarely even death  Telephone call to patient, In regards scheduling a Excision  Left voice message to patient to please return my call at there earliest convenience

## 2021-05-10 NOTE — TELEPHONE ENCOUNTER
Patient has an aneurysmal dermatofibroma  Not a skin cancer  If she wants to hold off on the excision, I am ok with it  Please let her know   Thank you normal...

## 2021-05-11 ENCOUNTER — OFFICE VISIT (OUTPATIENT)
Dept: DERMATOLOGY | Facility: CLINIC | Age: 56
End: 2021-05-11

## 2021-05-11 DIAGNOSIS — Z48.02 VISIT FOR SUTURE REMOVAL: Primary | ICD-10-CM

## 2021-05-11 PROCEDURE — RECHECK: Performed by: STUDENT IN AN ORGANIZED HEALTH CARE EDUCATION/TRAINING PROGRAM

## 2021-05-11 NOTE — PROGRESS NOTES
Suture removal    Date/Time: 5/11/2021 10:05 AM  Performed by: Sage Sánchez  Authorized by: Suraj Cross MD   Universal Protocol:  Consent: Verbal consent obtained  Consent given by: patient  Time out: Immediately prior to procedure a "time out" was called to verify the correct patient, procedure, equipment, support staff and site/side marked as required  Timeout called at: 5/11/2021 10:06 AM   Patient understanding: patient states understanding of the procedure being performed  Patient identity confirmed: verbally with patient        Patient location:  Clinic  Location:     Laterality:  Left    Location:  Lower extremity    Lower extremity location:  Leg    Leg location:  L lower leg  Procedure details:     Nail bed suture material: Forcep, 15 Blade  Wound appearance:  No sign(s) of infection, good wound healing, clean, moist and pink    Number of sutures removed:  2  Post-procedure details:     Post-procedure assessment: Vaseline, Band-Aid  Patient tolerance of procedure:   Tolerated well, no immediate complications

## 2021-06-07 DIAGNOSIS — M54.50 CHRONIC MIDLINE LOW BACK PAIN WITHOUT SCIATICA: ICD-10-CM

## 2021-06-07 DIAGNOSIS — G89.29 CHRONIC MIDLINE LOW BACK PAIN WITHOUT SCIATICA: ICD-10-CM

## 2021-06-07 RX ORDER — HYDROCODONE BITARTRATE AND ACETAMINOPHEN 7.5; 325 MG/1; MG/1
1 TABLET ORAL EVERY 4 HOURS PRN
Qty: 120 TABLET | Refills: 0 | Status: SHIPPED | OUTPATIENT
Start: 2021-06-07 | End: 2021-07-14 | Stop reason: SDUPTHER

## 2021-07-14 DIAGNOSIS — M54.50 CHRONIC MIDLINE LOW BACK PAIN WITHOUT SCIATICA: ICD-10-CM

## 2021-07-14 DIAGNOSIS — G89.29 CHRONIC MIDLINE LOW BACK PAIN WITHOUT SCIATICA: ICD-10-CM

## 2021-07-14 RX ORDER — HYDROCODONE BITARTRATE AND ACETAMINOPHEN 7.5; 325 MG/1; MG/1
1 TABLET ORAL EVERY 4 HOURS PRN
Qty: 120 TABLET | Refills: 0 | Status: SHIPPED | OUTPATIENT
Start: 2021-07-14 | End: 2021-08-09 | Stop reason: SDUPTHER

## 2021-08-09 ENCOUNTER — OFFICE VISIT (OUTPATIENT)
Dept: INTERNAL MEDICINE CLINIC | Facility: CLINIC | Age: 56
End: 2021-08-09
Payer: COMMERCIAL

## 2021-08-09 VITALS
DIASTOLIC BLOOD PRESSURE: 60 MMHG | TEMPERATURE: 98.5 F | OXYGEN SATURATION: 98 % | SYSTOLIC BLOOD PRESSURE: 102 MMHG | BODY MASS INDEX: 19.26 KG/M2 | HEIGHT: 65 IN | WEIGHT: 115.6 LBS | HEART RATE: 70 BPM

## 2021-08-09 DIAGNOSIS — M54.50 CHRONIC MIDLINE LOW BACK PAIN WITHOUT SCIATICA: ICD-10-CM

## 2021-08-09 DIAGNOSIS — G89.29 CHRONIC MIDLINE LOW BACK PAIN WITHOUT SCIATICA: ICD-10-CM

## 2021-08-09 PROCEDURE — 99213 OFFICE O/P EST LOW 20 MIN: CPT | Performed by: INTERNAL MEDICINE

## 2021-08-09 RX ORDER — HYDROCODONE BITARTRATE AND ACETAMINOPHEN 7.5; 325 MG/1; MG/1
1 TABLET ORAL EVERY 4 HOURS PRN
Qty: 120 TABLET | Refills: 0 | Status: SHIPPED | OUTPATIENT
Start: 2021-08-09 | End: 2021-09-10 | Stop reason: SDUPTHER

## 2021-08-09 RX ORDER — MELATONIN
1000 DAILY
COMMUNITY

## 2021-08-09 NOTE — PROGRESS NOTES
Assessment/Plan:    No problem-specific Assessment & Plan notes found for this encounter  Diagnoses and all orders for this visit:    Chronic midline low back pain without sciatica  -     HYDROcodone-acetaminophen (NORCO) 7 5-325 mg per tablet; Take 1 tablet by mouth every 4 (four) hours as needed for painMax Daily Amount: 6 tablets  -     CBC and Platelet; Future  -     Comprehensive metabolic panel; Future    Other orders  -     cholecalciferol (VITAMIN D3) 1,000 units tablet; Take 1,000 Units by mouth daily  -     NON FORMULARY; Women's Hormonal vitamin          Subjective:      Patient ID: Leonard Miranda is a 60 y o  female  Patient presents for follow-up visit for chronic low back pain  She denies any sciatic radiation  No bowel or bladder dysfunction        Family History   Problem Relation Age of Onset    Hypertension Mother         Benign Essential    Stroke Mother     Hypertension Father         Benign Essential    Other Maternal Grandfather         Cardiac disorder    Other Other         Malignant neoplasm of stomach    Diabetes type II Other     No Known Problems Sister     No Known Problems Daughter     Stomach cancer Maternal Grandmother     No Known Problems Paternal Grandmother     No Known Problems Paternal Grandfather     No Known Problems Paternal Aunt      Social History     Socioeconomic History    Marital status:      Spouse name: Not on file    Number of children: Not on file    Years of education: Not on file    Highest education level: Not on file   Occupational History    Not on file   Tobacco Use    Smoking status: Former Smoker     Types: Cigarettes     Quit date: 1/12/2018     Years since quitting: 3 5    Smokeless tobacco: Never Used    Tobacco comment: vapes flavored juice with nicotine   Vaping Use    Vaping Use: Some days    Substances: Nicotine (3 grams of nicotine), Flavoring   Substance and Sexual Activity    Alcohol use: Not Currently carbonate-vitamin D (OSCAL-D) 500 mg-200 units per tablet, Take 1 tablet by mouth daily (Patient not taking: Reported on 8/9/2021), Disp: , Rfl:     Probiotic CAPS, Take 1 capsule by mouth daily   (Patient not taking: Reported on 8/9/2021), Disp: , Rfl:   Allergies   Allergen Reactions    Codeine Nausea Only and Vomiting     Past Surgical History:   Procedure Laterality Date    DENTAL SURGERY      Impacted wisdom teeth    TONSILECTOMY AND ADNOIDECTOMY      TUBAL LIGATION Bilateral          Review of Systems   Constitutional: Negative  HENT: Negative  Eyes: Negative  Respiratory: Negative  Cardiovascular: Negative  Gastrointestinal: Negative  Endocrine: Negative  Genitourinary: Negative  Musculoskeletal: Positive for back pain (Chronic)  Skin: Negative  Allergic/Immunologic: Negative  Neurological: Negative  Hematological: Negative  Psychiatric/Behavioral: Negative  Objective:      /60   Pulse 70   Temp 98 5 °F (36 9 °C) (Tympanic)   Ht 5' 5 28" (1 658 m)   Wt 52 4 kg (115 lb 9 6 oz)   SpO2 98%   BMI 19 07 kg/m²          Physical Exam  Vitals reviewed  Constitutional:       General: She is not in acute distress  Appearance: Normal appearance  She is normal weight  She is not ill-appearing, toxic-appearing or diaphoretic  HENT:      Head: Normocephalic and atraumatic  Right Ear: External ear normal       Left Ear: External ear normal    Eyes:      General: No scleral icterus  Pupils: Pupils are equal, round, and reactive to light  Cardiovascular:      Rate and Rhythm: Normal rate  Pulmonary:      Effort: Pulmonary effort is normal  No respiratory distress  Abdominal:      General: Abdomen is flat  There is no distension  Musculoskeletal:         General: No swelling  Cervical back: Neck supple  Right lower leg: No edema  Left lower leg: No edema  Skin:     Coloration: Skin is not jaundiced        Findings: No bruising, erythema or rash  Neurological:      General: No focal deficit present  Mental Status: She is alert and oriented to person, place, and time     Psychiatric:         Mood and Affect: Mood normal          Behavior: Behavior normal

## 2021-09-10 DIAGNOSIS — G89.29 CHRONIC MIDLINE LOW BACK PAIN WITHOUT SCIATICA: ICD-10-CM

## 2021-09-10 DIAGNOSIS — M54.50 CHRONIC MIDLINE LOW BACK PAIN WITHOUT SCIATICA: ICD-10-CM

## 2021-09-10 RX ORDER — HYDROCODONE BITARTRATE AND ACETAMINOPHEN 7.5; 325 MG/1; MG/1
1 TABLET ORAL EVERY 4 HOURS PRN
Qty: 120 TABLET | Refills: 0 | Status: SHIPPED | OUTPATIENT
Start: 2021-09-10 | End: 2021-10-18 | Stop reason: SDUPTHER

## 2021-10-18 DIAGNOSIS — M54.50 CHRONIC MIDLINE LOW BACK PAIN WITHOUT SCIATICA: ICD-10-CM

## 2021-10-18 DIAGNOSIS — G89.29 CHRONIC MIDLINE LOW BACK PAIN WITHOUT SCIATICA: ICD-10-CM

## 2021-10-18 RX ORDER — HYDROCODONE BITARTRATE AND ACETAMINOPHEN 7.5; 325 MG/1; MG/1
1 TABLET ORAL EVERY 4 HOURS PRN
Qty: 120 TABLET | Refills: 0 | Status: SHIPPED | OUTPATIENT
Start: 2021-10-18 | End: 2021-11-22 | Stop reason: SDUPTHER

## 2021-11-17 ENCOUNTER — TELEPHONE (OUTPATIENT)
Dept: INTERNAL MEDICINE CLINIC | Facility: CLINIC | Age: 56
End: 2021-11-17

## 2021-11-22 ENCOUNTER — OFFICE VISIT (OUTPATIENT)
Dept: INTERNAL MEDICINE CLINIC | Facility: CLINIC | Age: 56
End: 2021-11-22
Payer: COMMERCIAL

## 2021-11-22 VITALS
BODY MASS INDEX: 18.32 KG/M2 | WEIGHT: 114 LBS | DIASTOLIC BLOOD PRESSURE: 72 MMHG | SYSTOLIC BLOOD PRESSURE: 104 MMHG | TEMPERATURE: 97.8 F | OXYGEN SATURATION: 99 % | HEART RATE: 50 BPM | HEIGHT: 66 IN

## 2021-11-22 DIAGNOSIS — M54.50 CHRONIC MIDLINE LOW BACK PAIN WITHOUT SCIATICA: ICD-10-CM

## 2021-11-22 DIAGNOSIS — G89.29 CHRONIC MIDLINE LOW BACK PAIN WITHOUT SCIATICA: ICD-10-CM

## 2021-11-22 DIAGNOSIS — H00.011 HORDEOLUM EXTERNUM OF RIGHT UPPER EYELID: Primary | ICD-10-CM

## 2021-11-22 DIAGNOSIS — Z00.00 ANNUAL PHYSICAL EXAM: ICD-10-CM

## 2021-11-22 DIAGNOSIS — E55.9 VITAMIN D DEFICIENCY: ICD-10-CM

## 2021-11-22 DIAGNOSIS — Z23 NEED FOR INFLUENZA VACCINATION: ICD-10-CM

## 2021-11-22 PROBLEM — H00.019 HORDEOLUM: Status: ACTIVE | Noted: 2021-11-22

## 2021-11-22 PROCEDURE — 90682 RIV4 VACC RECOMBINANT DNA IM: CPT

## 2021-11-22 PROCEDURE — 3008F BODY MASS INDEX DOCD: CPT

## 2021-11-22 PROCEDURE — 99214 OFFICE O/P EST MOD 30 MIN: CPT

## 2021-11-22 PROCEDURE — 67700 BLEPHAROTOMY DRG ABSC EYELID: CPT | Performed by: INTERNAL MEDICINE

## 2021-11-22 PROCEDURE — 1036F TOBACCO NON-USER: CPT

## 2021-11-22 PROCEDURE — 3725F SCREEN DEPRESSION PERFORMED: CPT

## 2021-11-22 PROCEDURE — 90471 IMMUNIZATION ADMIN: CPT

## 2021-11-22 RX ORDER — HYDROCODONE BITARTRATE AND ACETAMINOPHEN 7.5; 325 MG/1; MG/1
1 TABLET ORAL EVERY 4 HOURS PRN
Qty: 120 TABLET | Refills: 0 | Status: CANCELLED | OUTPATIENT
Start: 2021-11-22

## 2021-11-22 RX ORDER — CEFUROXIME AXETIL 500 MG/1
500 TABLET ORAL EVERY 12 HOURS SCHEDULED
Qty: 14 TABLET | Refills: 0 | Status: SHIPPED | OUTPATIENT
Start: 2021-11-22 | End: 2021-11-29

## 2021-11-22 RX ORDER — HYDROCODONE BITARTRATE AND ACETAMINOPHEN 7.5; 325 MG/1; MG/1
1 TABLET ORAL EVERY 4 HOURS PRN
Qty: 120 TABLET | Refills: 0 | Status: SHIPPED | OUTPATIENT
Start: 2021-11-22 | End: 2021-12-27 | Stop reason: SDUPTHER

## 2021-11-22 RX ORDER — MULTIVIT WITH MINERALS/LUTEIN
1000 TABLET ORAL DAILY
COMMUNITY

## 2021-12-27 DIAGNOSIS — G89.29 CHRONIC MIDLINE LOW BACK PAIN WITHOUT SCIATICA: ICD-10-CM

## 2021-12-27 DIAGNOSIS — M54.50 CHRONIC MIDLINE LOW BACK PAIN WITHOUT SCIATICA: ICD-10-CM

## 2021-12-28 RX ORDER — HYDROCODONE BITARTRATE AND ACETAMINOPHEN 7.5; 325 MG/1; MG/1
1 TABLET ORAL EVERY 4 HOURS PRN
Qty: 120 TABLET | Refills: 0 | Status: SHIPPED | OUTPATIENT
Start: 2021-12-28 | End: 2022-02-01 | Stop reason: SDUPTHER

## 2022-01-06 ENCOUNTER — OFFICE VISIT (OUTPATIENT)
Dept: INTERNAL MEDICINE CLINIC | Facility: CLINIC | Age: 57
End: 2022-01-06
Payer: COMMERCIAL

## 2022-01-06 VITALS
OXYGEN SATURATION: 98 % | BODY MASS INDEX: 18.66 KG/M2 | HEIGHT: 65 IN | WEIGHT: 112 LBS | TEMPERATURE: 98 F | SYSTOLIC BLOOD PRESSURE: 110 MMHG | HEART RATE: 72 BPM | DIASTOLIC BLOOD PRESSURE: 74 MMHG

## 2022-01-06 DIAGNOSIS — Z87.42 HISTORY OF POSTMENOPAUSAL BLEEDING: ICD-10-CM

## 2022-01-06 DIAGNOSIS — M47.817 SPONDYLOSIS OF LUMBOSACRAL REGION WITHOUT MYELOPATHY OR RADICULOPATHY: ICD-10-CM

## 2022-01-06 DIAGNOSIS — Z12.31 ENCOUNTER FOR SCREENING MAMMOGRAM FOR BREAST CANCER: Primary | ICD-10-CM

## 2022-01-06 DIAGNOSIS — G89.29 CHRONIC MIDLINE LOW BACK PAIN WITHOUT SCIATICA: ICD-10-CM

## 2022-01-06 DIAGNOSIS — M54.50 CHRONIC MIDLINE LOW BACK PAIN WITHOUT SCIATICA: ICD-10-CM

## 2022-01-06 PROCEDURE — 3008F BODY MASS INDEX DOCD: CPT | Performed by: INTERNAL MEDICINE

## 2022-01-06 PROCEDURE — 1036F TOBACCO NON-USER: CPT | Performed by: INTERNAL MEDICINE

## 2022-01-06 PROCEDURE — 99213 OFFICE O/P EST LOW 20 MIN: CPT | Performed by: INTERNAL MEDICINE

## 2022-01-06 NOTE — PROGRESS NOTES
Assessment/Plan:    Arthritis, degenerative  Lumbosacral sacroiliac degenerative arthritis  Continue calcium vitamin-D supplementation, exercises and walking    Chronic low back pain  Calcium and vitamin-D, hydrocodone p r n  and multivitamins  History of postmenopausal bleeding  Postmenopausal bleeding following COVID-19 vaccinations  Uterine ultrasound normal endometrial thickness no evidence of leiomyoma or other lesions       Diagnoses and all orders for this visit:    Encounter for screening mammogram for breast cancer    Chronic midline low back pain without sciatica    History of postmenopausal bleeding    Spondylosis of lumbosacral region without myelopathy or radiculopathy          Subjective:      Patient ID: Joshua Oglesby is a 60 y o  female  Patient presents to the office for follow-up visit  Overall she is doing well  She has had some issues with postmenopausal bleeding and vasomotor symptoms following her 1st 2 doses of the COVID vaccine  She is scheduled for booster but because of the bleeding and vasomotor symptoms she does not wish to receive the booster  This apparently has been a very common issue with women reporting menstrual irregularities after COVID vaccination  In this particular patient she has been postmenopausal for several years and following the COVID vaccine, experienced onset of menstrual bleeding along with significant vasomotor symptoms  An ultrasound of the uterus was completely normal   The bleeding has gradually subsided over time  Her employer is mandating that employees receive the booster vaccine but the patient has valid concerns given her recent experience with the initial vaccines  My recommendation would be that the patient not received the booster  We will write a letter to that effect  Otherwise, her back pain is steady and stable and certainly tolerable    She is also experiencing intermittent discomfort in her left forefoot but nothing that is disabling  She is also complains of some bilateral sacroiliac pain  There are no radicular symptoms    Symptoms are improved with activity      Family History   Problem Relation Age of Onset    Hypertension Mother         Benign Essential    Stroke Mother     Hypertension Father         Benign Essential    Other Maternal Grandfather         Cardiac disorder    Other Other         Malignant neoplasm of stomach    Diabetes type II Other     No Known Problems Sister     No Known Problems Daughter     Stomach cancer Maternal Grandmother     No Known Problems Paternal Grandmother     No Known Problems Paternal Grandfather     No Known Problems Paternal Aunt      Social History     Socioeconomic History    Marital status:      Spouse name: Not on file    Number of children: Not on file    Years of education: Not on file    Highest education level: Not on file   Occupational History    Not on file   Tobacco Use    Smoking status: Former Smoker     Packs/day: 1 50     Years: 35 00     Pack years: 52 50     Types: Cigarettes     Quit date: 1/12/2018     Years since quitting: 3 9    Smokeless tobacco: Never Used    Tobacco comment: vapes flavored juice with nicotine   Vaping Use    Vaping Use: Some days    Substances: Nicotine (3 grams of nicotine), Flavoring   Substance and Sexual Activity    Alcohol use: Not Currently     Comment: Rarely consumes alcohol    Drug use: No    Sexual activity: Not Currently     Partners: Male     Birth control/protection: Female Sterilization   Other Topics Concern    Not on file   Social History Narrative    Advance directive in chart    Caffeine Use     Social Determinants of Health     Financial Resource Strain: Not on file   Food Insecurity: Not on file   Transportation Needs: Not on file   Physical Activity: Not on file   Stress: Not on file   Social Connections: Not on file   Intimate Partner Violence: Not on file   Housing Stability: Not on file Past Medical History:   Diagnosis Date   Rick Carver Carrier of disease     Carrier or suspected carrier of other venereal diseases: Hepatitis B (Previous exposure, cannot donate blood)    Chronic low back pain     Eustachian tube dysfunction     Rotator cuff tendinitis        Current Outpatient Medications:     Ascorbic Acid (vitamin C) 1000 MG tablet, Take 1,000 mg by mouth daily, Disp: , Rfl:     calcium carbonate-vitamin D (OSCAL-D) 500 mg-200 units per tablet, Take 1 tablet by mouth daily  , Disp: , Rfl:     Calcium-Magnesium-Vitamin D (CALCIUM MAGNESIUM PO), Take by mouth With potassium, Disp: , Rfl:     cholecalciferol (VITAMIN D3) 1,000 units tablet, Take 1,000 Units by mouth daily, Disp: , Rfl:     HYDROcodone-acetaminophen (NORCO) 7 5-325 mg per tablet, Take 1 tablet by mouth every 4 (four) hours as needed for pain Max Daily Amount: 6 tablets, Disp: 120 tablet, Rfl: 0    multivitamin (THERAGRAN) TABS, Take 1 tablet by mouth daily, Disp: , Rfl:     Zinc Sulfate (ZINC 15 PO), Take by mouth, Disp: , Rfl:   Allergies   Allergen Reactions    Codeine Nausea Only and Vomiting     Past Surgical History:   Procedure Laterality Date    DENTAL SURGERY      Impacted wisdom teeth    TONSILECTOMY AND ADNOIDECTOMY      TUBAL LIGATION Bilateral          Review of Systems   Constitutional: Negative  HENT: Negative  Eyes: Negative  Respiratory: Negative  Cardiovascular: Negative  Gastrointestinal: Negative  Endocrine: Negative  Vasomotor symptoms   Genitourinary: Positive for vaginal bleeding  Musculoskeletal: Positive for arthralgias (Left forefoot) and back pain  Skin: Negative  Neurological: Negative  Hematological: Negative  Psychiatric/Behavioral: Negative            Objective:      /74 (BP Location: Left arm, Patient Position: Sitting, Cuff Size: Standard)   Pulse 72   Temp 98 °F (36 7 °C) (Tympanic)   Ht 5' 4 84" (1 647 m)   Wt 50 8 kg (112 lb)   SpO2 98% Comment: room air  BMI 18 73 kg/m²          Physical Exam  Vitals reviewed  Constitutional:       General: She is not in acute distress  Appearance: Normal appearance  She is normal weight  She is not ill-appearing, toxic-appearing or diaphoretic  HENT:      Head: Normocephalic and atraumatic  Right Ear: External ear normal       Left Ear: External ear normal       Nose: Nose normal    Eyes:      Conjunctiva/sclera: Conjunctivae normal       Pupils: Pupils are equal, round, and reactive to light  Cardiovascular:      Rate and Rhythm: Normal rate and regular rhythm  Pulses: Normal pulses  Heart sounds: Normal heart sounds  No murmur heard  Pulmonary:      Effort: Pulmonary effort is normal  No respiratory distress  Breath sounds: Normal breath sounds  No wheezing or rales  Abdominal:      General: Abdomen is flat  There is no distension  Musculoskeletal:         General: No swelling  Cervical back: Neck supple  No rigidity  Right lower leg: No edema  Left lower leg: No edema  Skin:     General: Skin is warm  Capillary Refill: Capillary refill takes less than 2 seconds  Coloration: Skin is not jaundiced  Findings: No bruising, erythema or rash  Neurological:      General: No focal deficit present  Mental Status: She is alert and oriented to person, place, and time  Mental status is at baseline     Psychiatric:         Mood and Affect: Mood normal          Behavior: Behavior normal

## 2022-01-06 NOTE — ASSESSMENT & PLAN NOTE
Lumbosacral sacroiliac degenerative arthritis    Continue calcium vitamin-D supplementation, exercises and walking

## 2022-01-06 NOTE — LETTER
To Whom it May Concern:    Re: Zaid Snyder is requesting a medical exemption to the requirement for a COVID-19 booster  Following her initial vaccination with the Sal Estuardo mRNA vaccine, the patient developed abnormal uterine bleeding  Following her 2nd vaccination of the series bleeding intensified and was associated with severe vasomotor symptoms  She was seen by her gynecologist and underwent ultrasound studies which were NORMAL  It is now well established that the mRNA COVID vaccines have resulted in abnormal uterine bleeding in a multitude of women and is currently under study by the [employer]  Preliminary studies hypothesize that the immune response following vaccination may bring immune cells into the endometrium of the uterus resulting in the abnormal menstrual irregularities that women are reporting  It is my medical recommendation that further mRNA COVID vaccine not be administered due to the association of abnormal uterine bleeding which occurred in this postmenopausal patient and the likelihood that a booster vaccine would produce further bleeding abnormalities  If you require any additional information please contact me at the above address  Sincerely,            LETTY Saleh

## 2022-01-06 NOTE — ASSESSMENT & PLAN NOTE
Postmenopausal bleeding following COVID-19 vaccinations    Uterine ultrasound normal endometrial thickness no evidence of leiomyoma or other lesions

## 2022-02-01 DIAGNOSIS — M54.50 CHRONIC MIDLINE LOW BACK PAIN WITHOUT SCIATICA: ICD-10-CM

## 2022-02-01 DIAGNOSIS — G89.29 CHRONIC MIDLINE LOW BACK PAIN WITHOUT SCIATICA: ICD-10-CM

## 2022-02-01 RX ORDER — HYDROCODONE BITARTRATE AND ACETAMINOPHEN 7.5; 325 MG/1; MG/1
1 TABLET ORAL EVERY 4 HOURS PRN
Qty: 120 TABLET | Refills: 0 | Status: SHIPPED | OUTPATIENT
Start: 2022-02-01 | End: 2022-03-04 | Stop reason: SDUPTHER

## 2022-02-28 ENCOUNTER — RA CDI HCC (OUTPATIENT)
Dept: OTHER | Facility: HOSPITAL | Age: 57
End: 2022-02-28

## 2022-02-28 NOTE — PROGRESS NOTES
[facility]  coding opportunities       Chart reviewed, no opportunity found: CHART REVIEWED, NO OPPORTUNITY FOUND                        Patients insurance company: [employer] (Medicare Advantage and Commercial)

## 2022-03-04 DIAGNOSIS — M54.50 CHRONIC MIDLINE LOW BACK PAIN WITHOUT SCIATICA: ICD-10-CM

## 2022-03-04 DIAGNOSIS — G89.29 CHRONIC MIDLINE LOW BACK PAIN WITHOUT SCIATICA: ICD-10-CM

## 2022-03-04 RX ORDER — HYDROCODONE BITARTRATE AND ACETAMINOPHEN 7.5; 325 MG/1; MG/1
1 TABLET ORAL EVERY 4 HOURS PRN
Qty: 120 TABLET | Refills: 0 | Status: SHIPPED | OUTPATIENT
Start: 2022-03-04 | End: 2022-04-05 | Stop reason: SDUPTHER

## 2022-03-07 ENCOUNTER — OFFICE VISIT (OUTPATIENT)
Dept: INTERNAL MEDICINE CLINIC | Facility: CLINIC | Age: 57
End: 2022-03-07
Payer: COMMERCIAL

## 2022-03-07 VITALS
WEIGHT: 111.6 LBS | OXYGEN SATURATION: 98 % | BODY MASS INDEX: 18.59 KG/M2 | DIASTOLIC BLOOD PRESSURE: 72 MMHG | HEIGHT: 65 IN | TEMPERATURE: 97.7 F | SYSTOLIC BLOOD PRESSURE: 114 MMHG | HEART RATE: 60 BPM

## 2022-03-07 DIAGNOSIS — J06.9 VIRAL UPPER RESPIRATORY TRACT INFECTION: ICD-10-CM

## 2022-03-07 DIAGNOSIS — G89.29 CHRONIC MIDLINE LOW BACK PAIN WITHOUT SCIATICA: Primary | ICD-10-CM

## 2022-03-07 DIAGNOSIS — M54.50 CHRONIC MIDLINE LOW BACK PAIN WITHOUT SCIATICA: Primary | ICD-10-CM

## 2022-03-07 PROCEDURE — 99213 OFFICE O/P EST LOW 20 MIN: CPT | Performed by: INTERNAL MEDICINE

## 2022-03-07 PROCEDURE — 3008F BODY MASS INDEX DOCD: CPT | Performed by: INTERNAL MEDICINE

## 2022-03-07 PROCEDURE — 1036F TOBACCO NON-USER: CPT | Performed by: INTERNAL MEDICINE

## 2022-03-07 NOTE — LETTER
March 7, 2022     Patient: Dorina Hernández   YOB: 1965   Date of Visit: 3/7/2022       To Whom it May Concern:    Shmuel Edmond is under my professional care  She was seen in my office on 3/7/2022  She may return to work on 3/8/2022  If you have any questions or concerns, please don't hesitate to call           Sincerely,          Aleida Ceron MD        CC: No Recipients

## 2022-03-07 NOTE — PROGRESS NOTES
Assessment/Plan:    Viral upper respiratory tract infection  Symptoms appear to be improving  Would continue symptomatic treatment  No indication for antibiotics  Chronic low back pain  Continues on calcium and vitamin-D supplements  Scarlet Gibson as needed       Diagnoses and all orders for this visit:    Chronic midline low back pain without sciatica    Viral upper respiratory tract infection          Subjective:      Patient ID: Clarke Amador is a 60 y o  female  Patient presents for follow-up visit  She is currently experiencing some upper respiratory and sinus congestion symptoms  She complains of rhinorrhea and nasal congestion but it is improving with some homeopathic treatments  Her back pain is chronic  She has not required any increase in her pain medication dosing  There are some days where she does not require any medication and other days where she requires more  In general she is doing well  She denies any chest discomfort or significant coughing    She has had no fever or chills she denies any sinus pain, visual symptoms, earache or sore throat      Family History   Problem Relation Age of Onset    Hypertension Mother         Benign Essential    Stroke Mother     Hypertension Father         Benign Essential    Other Maternal Grandfather         Cardiac disorder    Other Other         Malignant neoplasm of stomach    Diabetes type II Other     No Known Problems Sister     No Known Problems Daughter     Stomach cancer Maternal Grandmother     No Known Problems Paternal Grandmother     No Known Problems Paternal Grandfather     No Known Problems Paternal Aunt      Social History     Socioeconomic History    Marital status:      Spouse name: Not on file    Number of children: Not on file    Years of education: Not on file    Highest education level: Not on file   Occupational History    Not on file   Tobacco Use    Smoking status: Former Smoker     Packs/day: 1 50 Years: 35 00     Pack years: 52 50     Types: Cigarettes     Quit date: 2018     Years since quittin 1    Smokeless tobacco: Never Used    Tobacco comment: vapes flavored juice with nicotine   Vaping Use    Vaping Use: Some days    Substances: Nicotine (3 grams of nicotine), Flavoring   Substance and Sexual Activity    Alcohol use: Not Currently     Comment: Rarely consumes alcohol    Drug use: No    Sexual activity: Not Currently     Partners: Male     Birth control/protection: Female Sterilization   Other Topics Concern    Not on file   Social History Narrative    Advance directive in chart    Caffeine Use     Social Determinants of Health     Financial Resource Strain: Not on file   Food Insecurity: Not on file   Transportation Needs: Not on file   Physical Activity: Not on file   Stress: Not on file   Social Connections: Not on file   Intimate Partner Violence: Not on file   Housing Stability: Not on file     Past Medical History:   Diagnosis Date    Carrier of disease     Carrier or suspected carrier of other venereal diseases: Hepatitis B (Previous exposure, cannot donate blood)    Chronic low back pain     Eustachian tube dysfunction     Rotator cuff tendinitis        Current Outpatient Medications:     Ascorbic Acid (vitamin C) 1000 MG tablet, Take 1,000 mg by mouth daily, Disp: , Rfl:     calcium carbonate-vitamin D (OSCAL-D) 500 mg-200 units per tablet, Take 1 tablet by mouth daily  , Disp: , Rfl:     Calcium-Magnesium-Vitamin D (CALCIUM MAGNESIUM PO), Take by mouth With potassium, Disp: , Rfl:     cholecalciferol (VITAMIN D3) 1,000 units tablet, Take 1,000 Units by mouth daily, Disp: , Rfl:     HYDROcodone-acetaminophen (NORCO) 7 5-325 mg per tablet, Take 1 tablet by mouth every 4 (four) hours as needed for pain Max Daily Amount: 6 tablets, Disp: 120 tablet, Rfl: 0    multivitamin (THERAGRAN) TABS, Take 1 tablet by mouth daily, Disp: , Rfl:     Zinc Sulfate (ZINC 15 PO), Take by mouth, Disp: , Rfl:   Allergies   Allergen Reactions    Codeine Nausea Only and Vomiting     Past Surgical History:   Procedure Laterality Date    DENTAL SURGERY      Impacted wisdom teeth    TONSILECTOMY AND ADNOIDECTOMY      TUBAL LIGATION Bilateral          Review of Systems   Constitutional: Positive for fatigue (Mild)  HENT: Positive for congestion, rhinorrhea, sinus pressure and sneezing  Negative for ear discharge, ear pain, mouth sores, postnasal drip, sinus pain, sore throat, tinnitus, trouble swallowing and voice change  Eyes: Negative  Respiratory: Negative  Cardiovascular: Negative  Gastrointestinal: Negative  Endocrine: Negative  Genitourinary: Negative  Musculoskeletal: Positive for back pain (Chronic)  Skin: Negative  Allergic/Immunologic: Negative  Neurological: Negative  Hematological: Negative  Psychiatric/Behavioral: Negative  Objective:      /72 (BP Location: Left arm, Patient Position: Sitting, Cuff Size: Standard)   Pulse 60   Temp 97 7 °F (36 5 °C) (Tympanic)   Ht 5' 5 24" (1 657 m)   Wt 50 6 kg (111 lb 9 6 oz)   SpO2 98%   BMI 18 44 kg/m²  BMI Counseling: Body mass index is 18 44 kg/m²  The BMI is below normal  Patient was advised to gain weight  Physical Exam  Vitals reviewed  Constitutional:       General: She is not in acute distress  Appearance: Normal appearance  She is not ill-appearing, toxic-appearing or diaphoretic  HENT:      Head: Normocephalic and atraumatic  Right Ear: Tympanic membrane, ear canal and external ear normal  There is no impacted cerumen  Left Ear: Tympanic membrane, ear canal and external ear normal  There is no impacted cerumen  Nose: Nose normal  No congestion or rhinorrhea  Mouth/Throat:      Mouth: Mucous membranes are moist       Pharynx: Oropharynx is clear  No oropharyngeal exudate or posterior oropharyngeal erythema  Eyes:      General: No scleral icterus  Right eye: No discharge  Left eye: No discharge  Extraocular Movements: Extraocular movements intact  Conjunctiva/sclera: Conjunctivae normal       Pupils: Pupils are equal, round, and reactive to light  Cardiovascular:      Rate and Rhythm: Normal rate and regular rhythm  Heart sounds: Normal heart sounds  No murmur heard  Pulmonary:      Effort: Pulmonary effort is normal  No respiratory distress  Breath sounds: Normal breath sounds  Abdominal:      General: Abdomen is flat  There is no distension  Musculoskeletal:      Cervical back: Neck supple  No rigidity  Right lower leg: No edema  Left lower leg: No edema  Lymphadenopathy:      Cervical: No cervical adenopathy  Skin:     General: Skin is warm  Coloration: Skin is not jaundiced  Findings: No bruising, erythema or rash  Neurological:      General: No focal deficit present  Mental Status: She is alert  She is disoriented     Psychiatric:         Mood and Affect: Mood normal          Behavior: Behavior normal

## 2022-03-08 NOTE — ASSESSMENT & PLAN NOTE
Symptoms appear to be improving  Would continue symptomatic treatment  No indication for antibiotics

## 2022-04-05 DIAGNOSIS — M54.50 CHRONIC MIDLINE LOW BACK PAIN WITHOUT SCIATICA: ICD-10-CM

## 2022-04-05 DIAGNOSIS — G89.29 CHRONIC MIDLINE LOW BACK PAIN WITHOUT SCIATICA: ICD-10-CM

## 2022-04-05 RX ORDER — HYDROCODONE BITARTRATE AND ACETAMINOPHEN 7.5; 325 MG/1; MG/1
1 TABLET ORAL EVERY 4 HOURS PRN
Qty: 120 TABLET | Refills: 0 | Status: SHIPPED | OUTPATIENT
Start: 2022-04-05 | End: 2022-05-06 | Stop reason: SDUPTHER

## 2022-05-06 DIAGNOSIS — M54.50 CHRONIC MIDLINE LOW BACK PAIN WITHOUT SCIATICA: ICD-10-CM

## 2022-05-06 DIAGNOSIS — G89.29 CHRONIC MIDLINE LOW BACK PAIN WITHOUT SCIATICA: ICD-10-CM

## 2022-05-09 RX ORDER — HYDROCODONE BITARTRATE AND ACETAMINOPHEN 7.5; 325 MG/1; MG/1
1 TABLET ORAL EVERY 4 HOURS PRN
Qty: 120 TABLET | Refills: 0 | Status: SHIPPED | OUTPATIENT
Start: 2022-05-09 | End: 2022-06-23 | Stop reason: SDUPTHER

## 2022-06-16 ENCOUNTER — TELEPHONE (OUTPATIENT)
Dept: OTHER | Facility: OTHER | Age: 57
End: 2022-06-16

## 2022-06-16 NOTE — TELEPHONE ENCOUNTER
Pt called, to inform she will not be able to make it to the appointment, she is stuck in [facility]  Pt mentioned she will call back to reschedule once she lands

## 2022-06-22 DIAGNOSIS — G89.29 CHRONIC MIDLINE LOW BACK PAIN WITHOUT SCIATICA: ICD-10-CM

## 2022-06-22 DIAGNOSIS — M54.50 CHRONIC MIDLINE LOW BACK PAIN WITHOUT SCIATICA: ICD-10-CM

## 2022-06-22 NOTE — TELEPHONE ENCOUNTER
Rx refill for HYDROcodone-acetaminophen (NORCO) 7 5-325 mg per tablet  Send to [address] [place] [address]      LOV- 3/7/22  NOV- 7/11/22 cbc, cmp, t&s, a1c done results pending

## 2022-06-23 RX ORDER — HYDROCODONE BITARTRATE AND ACETAMINOPHEN 7.5; 325 MG/1; MG/1
1 TABLET ORAL EVERY 4 HOURS PRN
Qty: 120 TABLET | Refills: 0 | Status: SHIPPED | OUTPATIENT
Start: 2022-06-23 | End: 2022-07-29 | Stop reason: SDUPTHER

## 2022-07-11 ENCOUNTER — OFFICE VISIT (OUTPATIENT)
Dept: INTERNAL MEDICINE CLINIC | Facility: CLINIC | Age: 57
End: 2022-07-11
Payer: COMMERCIAL

## 2022-07-11 VITALS
DIASTOLIC BLOOD PRESSURE: 60 MMHG | HEART RATE: 56 BPM | OXYGEN SATURATION: 98 % | TEMPERATURE: 99.1 F | HEIGHT: 65 IN | SYSTOLIC BLOOD PRESSURE: 100 MMHG | BODY MASS INDEX: 19.46 KG/M2 | WEIGHT: 116.8 LBS

## 2022-07-11 DIAGNOSIS — M54.50 CHRONIC MIDLINE LOW BACK PAIN WITHOUT SCIATICA: ICD-10-CM

## 2022-07-11 DIAGNOSIS — E55.9 VITAMIN D DEFICIENCY: ICD-10-CM

## 2022-07-11 DIAGNOSIS — N95.1 VASOMOTOR SYMPTOMS DUE TO MENOPAUSE: ICD-10-CM

## 2022-07-11 DIAGNOSIS — Z12.31 ENCOUNTER FOR SCREENING MAMMOGRAM FOR MALIGNANT NEOPLASM OF BREAST: Primary | ICD-10-CM

## 2022-07-11 DIAGNOSIS — G89.29 CHRONIC MIDLINE LOW BACK PAIN WITHOUT SCIATICA: ICD-10-CM

## 2022-07-11 PROBLEM — J06.9 VIRAL UPPER RESPIRATORY TRACT INFECTION: Status: RESOLVED | Noted: 2022-03-07 | Resolved: 2022-07-11

## 2022-07-11 PROCEDURE — 99213 OFFICE O/P EST LOW 20 MIN: CPT | Performed by: INTERNAL MEDICINE

## 2022-07-11 PROCEDURE — 3725F SCREEN DEPRESSION PERFORMED: CPT | Performed by: INTERNAL MEDICINE

## 2022-07-11 NOTE — PROGRESS NOTES
Assessment/Plan:    Chronic low back pain  Intermittent use of hydrocodone when back pain flares    Vasomotor symptoms due to menopause  Single episode of vasomotor hot flush while traveling    Vitamin D deficiency  Continues with supplementation       Diagnoses and all orders for this visit:    Encounter for screening mammogram for malignant neoplasm of breast  -     Mammo screening bilateral w 3d & cad; Future    Chronic midline low back pain without sciatica    Vasomotor symptoms due to menopause    Vitamin D deficiency    Other orders  -     Acetylcysteine (NAC PO); Take by mouth in the morning          Subjective:      Patient ID: Paulino Jin is a 60 y o  female  The patient presents for a f/u visit  She recently [place]  She experienced a fall in her hotel bathroom suffering some minor bruising to her hip and shoulder  She had some vaginal spotting after her flight  She had undergone an extensive workup previously after she developed some vaginal spotting following COVID vaccination  She has not experienced any additional recurrence  She continues to experience some intermittent but frequent episodes of low back pain  Recently she has been almost pain free for the past several days and has not required the use of her pain medication  Additionally, while traveling she had very little incidence of severe low back discomfort although it was always present in a mild form  She is due for follow-up mammogram as well as scheduled labs        Family History   Problem Relation Age of Onset    Hypertension Mother         Benign Essential    Stroke Mother     Hypertension Father         Benign Essential    Other Maternal Grandfather         Cardiac disorder    Other Other         Malignant neoplasm of stomach    Diabetes type II Other     No Known Problems Sister     No Known Problems Daughter     Stomach cancer Maternal Grandmother     No Known Problems Paternal Grandmother     No Known Problems Paternal Grandfather     No Known Problems Paternal Aunt      Social History     Socioeconomic History    Marital status:      Spouse name: Not on file    Number of children: Not on file    Years of education: Not on file    Highest education level: Not on file   Occupational History    Not on file   Tobacco Use    Smoking status: Former Smoker     Packs/day: 1 50     Years: 35 00     Pack years: 52 50     Types: Cigarettes     Quit date: 2018     Years since quittin 4    Smokeless tobacco: Never Used    Tobacco comment: vapes flavored juice with nicotine   Vaping Use    Vaping Use: Some days    Substances: Nicotine (3 grams of nicotine), Flavoring   Substance and Sexual Activity    Alcohol use: Not Currently     Comment: Rarely consumes alcohol    Drug use: No    Sexual activity: Not Currently     Partners: Male     Birth control/protection: Female Sterilization   Other Topics Concern    Not on file   Social History Narrative    Advance directive in chart    Caffeine Use     Social Determinants of Health     Financial Resource Strain: Not on file   Food Insecurity: Not on file   Transportation Needs: Not on file   Physical Activity: Not on file   Stress: Not on file   Social Connections: Not on file   Intimate Partner Violence: Not on file   Housing Stability: Not on file     Past Medical History:   Diagnosis Date    Carrier of disease     Carrier or suspected carrier of other venereal diseases: Hepatitis B (Previous exposure, cannot donate blood)    Chronic low back pain     Eustachian tube dysfunction     Rotator cuff tendinitis        Current Outpatient Medications:     Acetylcysteine (NAC PO), Take by mouth in the morning, Disp: , Rfl:     Ascorbic Acid (vitamin C) 1000 MG tablet, Take 1,000 mg by mouth daily, Disp: , Rfl:     calcium carbonate-vitamin D (OSCAL-D) 500 mg-200 units per tablet, Take 1 tablet by mouth daily  , Disp: , Rfl:   Calcium-Magnesium-Vitamin D (CALCIUM MAGNESIUM PO), Take by mouth With potassium, Disp: , Rfl:     cholecalciferol (VITAMIN D3) 1,000 units tablet, Take 1,000 Units by mouth daily, Disp: , Rfl:     HYDROcodone-acetaminophen (NORCO) 7 5-325 mg per tablet, Take 1 tablet by mouth every 4 (four) hours as needed for pain Max Daily Amount: 6 tablets, Disp: 120 tablet, Rfl: 0    multivitamin (THERAGRAN) TABS, Take 1 tablet by mouth daily, Disp: , Rfl:     Zinc Sulfate (ZINC 15 PO), Take by mouth, Disp: , Rfl:   Allergies   Allergen Reactions    Codeine Nausea Only and Vomiting     Past Surgical History:   Procedure Laterality Date    DENTAL SURGERY      Impacted wisdom teeth    TONSILECTOMY AND ADNOIDECTOMY      TUBAL LIGATION Bilateral          Review of Systems   Constitutional: Negative  Negative for activity change, appetite change, chills, diaphoresis, fatigue, fever and unexpected weight change  HENT: Negative  Eyes: Negative  Respiratory: Negative  Cardiovascular: Negative  Gastrointestinal: Negative  Endocrine: Negative  Genitourinary: Positive for vaginal bleeding (Spotting)  Musculoskeletal: Positive for back pain (Chronic without sciatica)  Skin: Negative  Neurological: Negative  Hematological: Negative  Psychiatric/Behavioral: The patient is not nervous/anxious  Objective:      /60 (BP Location: Left arm, Patient Position: Sitting, Cuff Size: Standard)   Pulse 56   Temp 99 1 °F (37 3 °C) (Temporal)   Ht 5' 5 12" (1 654 m)   Wt 53 kg (116 lb 12 8 oz)   SpO2 98%   BMI 19 37 kg/m²          Physical Exam  Vitals reviewed  Constitutional:       General: She is not in acute distress  Appearance: Normal appearance  She is normal weight  She is not ill-appearing, toxic-appearing or diaphoretic  HENT:      Head: Normocephalic and atraumatic        Right Ear: External ear normal       Left Ear: External ear normal       Nose: Nose normal    Eyes: Conjunctiva/sclera: Conjunctivae normal       Pupils: Pupils are equal, round, and reactive to light  Cardiovascular:      Rate and Rhythm: Normal rate and regular rhythm  Pulses: Normal pulses  Heart sounds: Normal heart sounds  No murmur heard  Pulmonary:      Effort: Pulmonary effort is normal  No respiratory distress  Breath sounds: Normal breath sounds  No wheezing or rales  Abdominal:      General: Abdomen is flat  There is no distension  Musculoskeletal:         General: No swelling  Cervical back: Neck supple  No rigidity  Right lower leg: No edema  Left lower leg: No edema  Skin:     General: Skin is warm  Capillary Refill: Capillary refill takes less than 2 seconds  Coloration: Skin is not jaundiced  Findings: No bruising, erythema or rash  Neurological:      General: No focal deficit present  Mental Status: She is alert and oriented to person, place, and time  Mental status is at baseline     Psychiatric:         Mood and Affect: Mood normal          Behavior: Behavior normal

## 2022-07-29 DIAGNOSIS — G89.29 CHRONIC MIDLINE LOW BACK PAIN WITHOUT SCIATICA: ICD-10-CM

## 2022-07-29 DIAGNOSIS — M54.50 CHRONIC MIDLINE LOW BACK PAIN WITHOUT SCIATICA: ICD-10-CM

## 2022-07-29 RX ORDER — HYDROCODONE BITARTRATE AND ACETAMINOPHEN 7.5; 325 MG/1; MG/1
1 TABLET ORAL EVERY 4 HOURS PRN
Qty: 120 TABLET | Refills: 0 | Status: SHIPPED | OUTPATIENT
Start: 2022-07-29 | End: 2022-09-02 | Stop reason: SDUPTHER

## 2022-09-02 DIAGNOSIS — M54.50 CHRONIC MIDLINE LOW BACK PAIN WITHOUT SCIATICA: ICD-10-CM

## 2022-09-02 DIAGNOSIS — G89.29 CHRONIC MIDLINE LOW BACK PAIN WITHOUT SCIATICA: ICD-10-CM

## 2022-09-02 RX ORDER — HYDROCODONE BITARTRATE AND ACETAMINOPHEN 7.5; 325 MG/1; MG/1
1 TABLET ORAL EVERY 4 HOURS PRN
Qty: 120 TABLET | Refills: 0 | Status: SHIPPED | OUTPATIENT
Start: 2022-09-02 | End: 2022-10-11 | Stop reason: SDUPTHER

## 2022-09-27 ENCOUNTER — ANNUAL EXAM (OUTPATIENT)
Dept: OBGYN CLINIC | Facility: CLINIC | Age: 57
End: 2022-09-27
Payer: COMMERCIAL

## 2022-09-27 VITALS
BODY MASS INDEX: 18.83 KG/M2 | HEIGHT: 65 IN | SYSTOLIC BLOOD PRESSURE: 106 MMHG | DIASTOLIC BLOOD PRESSURE: 66 MMHG | WEIGHT: 113 LBS

## 2022-09-27 DIAGNOSIS — Z01.419 WOMEN'S ANNUAL ROUTINE GYNECOLOGICAL EXAMINATION: Primary | ICD-10-CM

## 2022-09-27 DIAGNOSIS — N95.0 POSTMENOPAUSAL BLEEDING: ICD-10-CM

## 2022-09-27 PROCEDURE — S0612 ANNUAL GYNECOLOGICAL EXAMINA: HCPCS | Performed by: NURSE PRACTITIONER

## 2022-09-27 NOTE — PROGRESS NOTES
Subjective    HPI:     Elinor Clark is a 60 y o  postmenopausal female  She is a [place] 2 Para 2, with  x 2  She is not sexually active  She has been experiencing hot flashes/night sweats - anywhere from 2 to 9 x a week  She has also been experiencing spotting like the end of a period for about a day or two- occurred in March, April, May  August she had 4 days of spotting  This month she had 2 days of spotting  This also occurred last year - states it started after she received her Covid vaccines  Pelvic US in 2021 was normal - endometrial lining was 2 mm  She denies /GI and Gyn complaints  She feels safe at home  She denies depression/anxiety  Medical, surgical and family history reviewed  Her dental care is up-to-date  She eats a healthy diet and exercises regularly  She is happy with her weight  Gynecologic History    No LMP recorded  Patient is postmenopausal     Last Pap: 21  Results were: normal  Last mammogram: 3/11/21  Results were: normal  Colonoscopy: 20 negative    Obstetric History    OB History    Para Term  AB Living   2 2 2         SAB IAB Ectopic Multiple Live Births                  # Outcome Date GA Lbr Tariq/2nd Weight Sex Delivery Anes PTL Lv   2 Term            1 Term                The following portions of the patient's history were reviewed and updated as appropriate: allergies, current medications, past family history, past medical history, past social history, past surgical history and problem list     Review of Systems    Pertinent items are noted in HPI  Objective    Physical Exam  Constitutional:       Appearance: Normal appearance  She is well-developed  Genitourinary:      Vulva, bladder and urethral meatus normal       No lesions in the vagina  Right Labia: No rash, tenderness, lesions, skin changes or Bartholin's cyst      Left Labia: No tenderness, lesions, skin changes, Bartholin's cyst or rash  No labial fusion noted  No inguinal adenopathy present in the right or left side  No vaginal discharge, erythema, tenderness, bleeding or granulation tissue  No vaginal prolapse present  Mild vaginal atrophy present  Right Adnexa: not tender, not full and no mass present  Left Adnexa: not tender, not full and no mass present  Cervix is not parous  No cervical motion tenderness, discharge, friability, lesion, polyp or nabothian cyst       Uterus is not enlarged, tender, irregular or prolapsed  No uterine mass detected  Uterus is anteverted  Pelvic exam was performed with patient in the lithotomy position  Breasts: Breasts are symmetrical       Right: No inverted nipple, mass, nipple discharge, skin change, tenderness, axillary adenopathy or supraclavicular adenopathy  Left: No inverted nipple, mass, nipple discharge, skin change, tenderness, axillary adenopathy or supraclavicular adenopathy  HENT:      Head: Normocephalic and atraumatic  Neck:      Thyroid: No thyromegaly  Cardiovascular:      Rate and Rhythm: Normal rate and regular rhythm  Heart sounds: Normal heart sounds, S1 normal and S2 normal    Pulmonary:      Effort: Pulmonary effort is normal       Breath sounds: Normal breath sounds  Abdominal:      General: Bowel sounds are normal  There is no distension  Palpations: Abdomen is soft  There is no mass  Tenderness: There is no abdominal tenderness  There is no guarding  Hernia: There is no hernia in the left inguinal area or right inguinal area  Musculoskeletal:      Cervical back: Neck supple  Lymphadenopathy:      Cervical: No cervical adenopathy  Upper Body:      Right upper body: No supraclavicular or axillary adenopathy  Left upper body: No supraclavicular or axillary adenopathy  Lower Body: No right inguinal adenopathy  No left inguinal adenopathy  Neurological:      Mental Status: She is alert     Skin:     General: Skin is warm and dry  Findings: No rash  Psychiatric:         Attention and Perception: Attention and perception normal          Mood and Affect: Mood and affect normal          Speech: Speech normal          Behavior: Behavior is cooperative  Thought Content: Thought content normal          Cognition and Memory: Cognition and memory normal          Judgment: Judgment normal    Vitals and nursing note reviewed  Assessment and Plan    Jemima was seen today for gynecologic exam     Diagnoses and all orders for this visit:    Women's annual routine gynecological examination    Postmenopausal bleeding  -     US pelvis complete w transvaginal; Future      Patient informed of a Stable GYN exam  A pap smear was not performed due to a normal pap in 2021  A pelvic US was ordered to further evaluate the postmenopausal spotting  I have discussed the importance of exercise and healthy diet as well as adequate intake of calcium and vitamin D  The current ASCCP guidelines were reviewed  The low risk patient will receive pap smear screening every 3 years until the age of 34 and then every 3 to 5 years with HPV co-testing from the ages of 33-67  I emphasized the importance of an annual pelvic and breast exam  A yearly mammogram is due  Results will be released to [facility], if abnormal will call to review and discuss treatment plan  All questions have been answered to her satisfaction  Follow up in: 1 year or sooner if needed  Nicolasa Castillo

## 2022-10-11 DIAGNOSIS — M54.50 CHRONIC MIDLINE LOW BACK PAIN WITHOUT SCIATICA: ICD-10-CM

## 2022-10-11 DIAGNOSIS — G89.29 CHRONIC MIDLINE LOW BACK PAIN WITHOUT SCIATICA: ICD-10-CM

## 2022-10-11 RX ORDER — HYDROCODONE BITARTRATE AND ACETAMINOPHEN 7.5; 325 MG/1; MG/1
1 TABLET ORAL EVERY 4 HOURS PRN
Qty: 120 TABLET | Refills: 0 | Status: SHIPPED | OUTPATIENT
Start: 2022-10-11

## 2022-10-13 ENCOUNTER — OFFICE VISIT (OUTPATIENT)
Dept: INTERNAL MEDICINE CLINIC | Facility: CLINIC | Age: 57
End: 2022-10-13
Payer: COMMERCIAL

## 2022-10-13 VITALS
DIASTOLIC BLOOD PRESSURE: 64 MMHG | HEART RATE: 86 BPM | SYSTOLIC BLOOD PRESSURE: 110 MMHG | WEIGHT: 114.6 LBS | HEIGHT: 65 IN | BODY MASS INDEX: 19.09 KG/M2 | TEMPERATURE: 99.3 F | OXYGEN SATURATION: 98 %

## 2022-10-13 DIAGNOSIS — M54.50 CHRONIC MIDLINE LOW BACK PAIN WITHOUT SCIATICA: ICD-10-CM

## 2022-10-13 DIAGNOSIS — Z87.42 HISTORY OF POSTMENOPAUSAL BLEEDING: ICD-10-CM

## 2022-10-13 DIAGNOSIS — J06.9 VIRAL URI: Primary | ICD-10-CM

## 2022-10-13 DIAGNOSIS — G89.29 CHRONIC MIDLINE LOW BACK PAIN WITHOUT SCIATICA: ICD-10-CM

## 2022-10-13 PROCEDURE — 99213 OFFICE O/P EST LOW 20 MIN: CPT | Performed by: INTERNAL MEDICINE

## 2022-10-13 RX ORDER — FLUTICASONE PROPIONATE 50 MCG
2 SPRAY, SUSPENSION (ML) NASAL DAILY
Qty: 16 G | Refills: 1 | Status: SHIPPED | OUTPATIENT
Start: 2022-10-13 | End: 2022-11-12

## 2022-10-13 RX ORDER — CHLORPHENIRAMINE MALEATE 4 MG/1
4 TABLET ORAL EVERY 6 HOURS PRN
Qty: 30 TABLET | Refills: 0 | Status: SHIPPED | OUTPATIENT
Start: 2022-10-13

## 2022-10-13 NOTE — PROGRESS NOTES
Assessment/Plan:    Viral URI  Patient was provided with recommendations to take chlorpheniramine every 6 hours as needed Flonase nasal spray 2 sprays each nostril at bedtime  If symptoms are not improved or if the patient should develop fever she should contact the office for consideration of addition of antibiotics    Chronic low back pain  Chronic low back pain  Patient has been using Norco generally once or twice daily but occasionally has days where she needs to take it every 4 hours for several doses    History of postmenopausal bleeding  Following the gyn  She is continuing to have spotting  She is scheduled for another ultrasound  Subjective:      Patient ID: Radha Duggan is a 60 y o  female  Patient is scheduled for 3 month follow-up visit on Monday but presents now with complaints of some maxillary sinus discomfort, headache, sore throat and the postnasal drip occurring intermittently  She took a COVID test earlier today tested negative  Symptoms are also associated with a degree of fatigue  She denies fevers  She has had no chills or night sweats  She has not had any coughing  There have been no GI symptoms such as nausea, vomiting or diarrhea  She complains of pain posterior and inferior to her ears at the angle of the mandible  Review of Systems   Constitutional: Positive for fatigue  Negative for activity change, appetite change, chills, diaphoresis and fever  HENT: Positive for congestion, ear pain, rhinorrhea, sinus pressure and sore throat  Negative for postnasal drip, sinus pain, trouble swallowing and voice change  Eyes: Positive for pain (Ocular discomfort) and itching  Negative for photophobia, discharge, redness and visual disturbance  Respiratory: Negative  Cardiovascular: Negative  Gastrointestinal: Negative  Endocrine: Negative  Genitourinary: Negative  Musculoskeletal: Negative  Allergic/Immunologic: Negative      Neurological: Negative  Hematological: Negative  Psychiatric/Behavioral: Negative  Objective:      /64 (BP Location: Left arm, Patient Position: Sitting, Cuff Size: Standard)   Pulse 86   Temp 99 3 °F (37 4 °C) (Temporal)   Ht 5' 5 35" (1 66 m)   Wt 52 kg (114 lb 9 6 oz)   SpO2 98%   BMI 18 86 kg/m²          Physical Exam  Vitals reviewed  Constitutional:       General: She is not in acute distress  Appearance: Normal appearance  She is normal weight  She is not ill-appearing or toxic-appearing  HENT:      Head: Normocephalic and atraumatic  Right Ear: Tympanic membrane, ear canal and external ear normal  There is no impacted cerumen  Left Ear: Tympanic membrane, ear canal and external ear normal  There is no impacted cerumen  Nose: Congestion and rhinorrhea present  Mouth/Throat:      Mouth: Mucous membranes are moist       Pharynx: Oropharynx is clear  No oropharyngeal exudate or posterior oropharyngeal erythema  Eyes:      General: No scleral icterus  Right eye: No discharge  Left eye: No discharge  Conjunctiva/sclera: Conjunctivae normal       Pupils: Pupils are equal, round, and reactive to light  Cardiovascular:      Rate and Rhythm: Normal rate and regular rhythm  Pulses: Normal pulses  Heart sounds: Normal heart sounds  No murmur heard  Pulmonary:      Effort: Pulmonary effort is normal  No respiratory distress  Breath sounds: Normal breath sounds  Abdominal:      General: Abdomen is flat  Bowel sounds are normal  There is no distension  Musculoskeletal:      Cervical back: Neck supple  Tenderness (At the angle of the mandible below the ear) present  Right lower leg: No edema  Left lower leg: No edema  Lymphadenopathy:      Cervical: No cervical adenopathy  Skin:     General: Skin is warm  Coloration: Skin is not jaundiced  Findings: No bruising, erythema or rash     Neurological:      General: No focal deficit present  Mental Status: She is alert and oriented to person, place, and time  Mental status is at baseline  Psychiatric:         Mood and Affect: Mood normal          Behavior: Behavior normal          Thought Content:  Thought content normal          Judgment: Judgment normal

## 2022-10-13 NOTE — ASSESSMENT & PLAN NOTE
Chronic low back pain    Patient has been using Norco generally once or twice daily but occasionally has days where she needs to take it every 4 hours for several doses

## 2022-10-13 NOTE — ASSESSMENT & PLAN NOTE
Patient was provided with recommendations to take chlorpheniramine every 6 hours as needed Flonase nasal spray 2 sprays each nostril at bedtime    If symptoms are not improved or if the patient should develop fever she should contact the office for consideration of addition of antibiotics

## 2022-10-18 ENCOUNTER — TELEPHONE (OUTPATIENT)
Dept: INTERNAL MEDICINE CLINIC | Facility: CLINIC | Age: 57
End: 2022-10-18

## 2022-10-18 DIAGNOSIS — J01.90 ACUTE RHINOSINUSITIS: Primary | ICD-10-CM

## 2022-10-18 RX ORDER — AMOXICILLIN AND CLAVULANATE POTASSIUM 875; 125 MG/1; MG/1
1 TABLET, FILM COATED ORAL EVERY 12 HOURS SCHEDULED
Qty: 14 TABLET | Refills: 0 | Status: SHIPPED | OUTPATIENT
Start: 2022-10-18 | End: 2022-10-25

## 2022-10-18 NOTE — TELEPHONE ENCOUNTER
Patient was last seen on 10/13/22 and she is still experiencing symptoms of right ear pain and yellow mucus when she blows her nose  What would you advise her to do

## 2022-10-18 NOTE — TELEPHONE ENCOUNTER
I sent in a prescription to her pharmacy for antibiotic Augmentin    Twice daily with food for 7 days

## 2022-10-28 NOTE — TELEPHONE ENCOUNTER
Pt finished abx, has been taking Claritin & Flonase but her R ear feels like she is underwater  Please advise if there is anything else she can do  Pt gets a weird squeaking sound behind eye and in her head after blowing her nose

## 2022-10-28 NOTE — TELEPHONE ENCOUNTER
Spoke to patient and relayed message from Dr Elyse Dempsey to continue Claritin and Flonase every day

## 2022-11-10 ENCOUNTER — OFFICE VISIT (OUTPATIENT)
Dept: INTERNAL MEDICINE CLINIC | Age: 57
End: 2022-11-10

## 2022-11-10 VITALS
TEMPERATURE: 98.9 F | DIASTOLIC BLOOD PRESSURE: 82 MMHG | HEIGHT: 65 IN | WEIGHT: 115.8 LBS | BODY MASS INDEX: 19.29 KG/M2 | SYSTOLIC BLOOD PRESSURE: 124 MMHG | HEART RATE: 64 BPM | OXYGEN SATURATION: 98 %

## 2022-11-10 DIAGNOSIS — S60.459A FOREIGN BODY IN SKIN OF FINGER, INITIAL ENCOUNTER: ICD-10-CM

## 2022-11-10 DIAGNOSIS — H93.90 EAR LESION: ICD-10-CM

## 2022-11-10 DIAGNOSIS — N39.0 URINARY TRACT INFECTION WITH HEMATURIA, SITE UNSPECIFIED: Primary | ICD-10-CM

## 2022-11-10 DIAGNOSIS — Z23 NEED FOR TDAP VACCINATION: ICD-10-CM

## 2022-11-10 DIAGNOSIS — G89.29 CHRONIC MIDLINE LOW BACK PAIN WITHOUT SCIATICA: ICD-10-CM

## 2022-11-10 DIAGNOSIS — R31.9 URINARY TRACT INFECTION WITH HEMATURIA, SITE UNSPECIFIED: Primary | ICD-10-CM

## 2022-11-10 DIAGNOSIS — M54.50 CHRONIC MIDLINE LOW BACK PAIN WITHOUT SCIATICA: ICD-10-CM

## 2022-11-10 LAB
AMORPH URATE CRY URNS QL MICRO: ABNORMAL
BACTERIA UR QL AUTO: ABNORMAL /HPF
BILIRUB UR QL STRIP: NEGATIVE
CAOX CRY URNS QL MICRO: ABNORMAL /HPF
CLARITY UR: ABNORMAL
COLOR UR: YELLOW
GLUCOSE UR STRIP-MCNC: NEGATIVE MG/DL
HGB UR QL STRIP.AUTO: ABNORMAL
HYALINE CASTS #/AREA URNS LPF: ABNORMAL /LPF
KETONES UR STRIP-MCNC: NEGATIVE MG/DL
LEUKOCYTE ESTERASE UR QL STRIP: ABNORMAL
MUCOUS THREADS UR QL AUTO: ABNORMAL
NITRITE UR QL STRIP: NEGATIVE
NON-SQ EPI CELLS URNS QL MICRO: ABNORMAL /HPF
PH UR STRIP.AUTO: 7 [PH]
PROT UR STRIP-MCNC: ABNORMAL MG/DL
RBC #/AREA URNS AUTO: ABNORMAL /HPF
SL AMB  POCT GLUCOSE, UA: NORMAL
SL AMB LEUKOCYTE ESTERASE,UA: NORMAL
SL AMB POCT BILIRUBIN,UA: NORMAL
SL AMB POCT BLOOD,UA: NORMAL
SL AMB POCT CLARITY,UA: CLEAR
SL AMB POCT COLOR,UA: YELLOW
SL AMB POCT KETONES,UA: NORMAL
SL AMB POCT NITRITE,UA: NORMAL
SL AMB POCT PH,UA: 6.5
SL AMB POCT SPECIFIC GRAVITY,UA: 1.02
SL AMB POCT URINE PROTEIN: 30
SL AMB POCT UROBILINOGEN: 0.2
SP GR UR STRIP.AUTO: 1.02 (ref 1–1.03)
UROBILINOGEN UR STRIP-ACNC: <2 MG/DL
WBC #/AREA URNS AUTO: ABNORMAL /HPF

## 2022-11-10 RX ORDER — CEPHALEXIN 500 MG/1
500 CAPSULE ORAL EVERY 12 HOURS SCHEDULED
Qty: 10 CAPSULE | Refills: 0 | Status: SHIPPED | OUTPATIENT
Start: 2022-11-10 | End: 2022-11-15

## 2022-11-10 RX ORDER — HYDROCODONE BITARTRATE AND ACETAMINOPHEN 7.5; 325 MG/1; MG/1
1 TABLET ORAL EVERY 4 HOURS PRN
Qty: 120 TABLET | Refills: 0 | Status: SHIPPED | OUTPATIENT
Start: 2022-11-10

## 2022-11-10 NOTE — PROGRESS NOTES
Assessment/Plan:    Urinary tract infection  -point of care urine dipstick was positive for moderate blood and leukocytes but negative for nitrites  -will start patient on cephalexin 500 mg twice daily for 5 days  -will order a urinalysis with microscopy and reflex culture and sensitivity and follow up with the results and adjust antibiotics as needed  -patient was counseled to drink at least 2 L of water daily    Ear lesion  -lesion is around the right tympanic membrane  -will refer patient to ENT for proper evaluation    Foreign body in skin of finger  -finger was cleansed and splinter that measures about 2 mm was removed from the palmar aspect of the left index finger  -patient tolerated the procedure without any adverse reaction    Universal Protocol:  Consent: Verbal consent obtained  Patient understanding: patient states understanding of the procedure being performed    Foreign body removal    Date/Time: 11/10/2022 4:14 PM  Performed by: Andrews Lieberman DO  Authorized by: Andrews Lieberman DO   Body area: skin  General location: upper extremity  Location details: left index finger    Sedation:  Patient sedated: no  Patient restrained: no  Patient cooperative: yes  Localization method: visualized  Removal mechanism: scalpel  Dressing: dressing applied  Tendon involvement: none  Depth: subcutaneous  Complexity: simple  1 objects recovered  Objects recovered: Splinter  Post-procedure assessment: foreign body removed  Patient tolerance: patient tolerated the procedure well with no immediate complications        Need for Tdap vaccine  -patient is not up-to-date with her Tdap  -she will go to the [facility] office tomorrow and get the Tdap     Diagnoses and all orders for this visit:    Urinary tract infection with hematuria, site unspecified  -     cephalexin (KEFLEX) 500 mg capsule;  Take 1 capsule (500 mg total) by mouth every 12 (twelve) hours for 5 days  -     UA w Reflex to Microscopic w Reflex to Culture - Clinic Collect  -     POCT urine dip    Ear lesion  -     Ambulatory Referral to Otolaryngology; Future    Foreign body in skin of finger, initial encounter  -     TDAP VACCINE GREATER THAN OR EQUAL TO 6YO IM; Future    Need for Tdap vaccination  -     TDAP VACCINE GREATER THAN OR EQUAL TO 6YO IM; Future          Subjective:      Patient ID: Shreyas Hines is a 60 y o  female  HPI  Pt presents with complaints that she developed nocturia with small vol urine yesterday night  She also had dysuria with a feeling of incomplete emptying and urinary urgency as well as urge incontinence that occurred 1 time  She also admits to lower abdominal pressure, nausea but denies hematuria, flank pain, fever, chills, night sweats, headache, dizziness, chest pain, shortness of breath, palpitations  She denies any vaginal discharge  Last time she had a uti was over 25 year ago   She is not sexually active  She also c/o of clogged sensation in her R ear since her urti infecton that occurred about a month ago and she has been using flonase and claritin that were prescribed by her PCP but states that her symptoms have not improved  She also complains of a splinter from a aure thorn in her left index finger  She states that it occurred over a day ago while she was working in her aure bush and she has been trying to take it out without success  She would like to have the splinter removed  Of note, she cannot remember when she got her last tetanus shot  The following portions of the patient's history were reviewed and updated as appropriate:   She  has a past medical history of Carrier of disease, Chronic low back pain, Eustachian tube dysfunction, and Rotator cuff tendinitis    She   Patient Active Problem List    Diagnosis Date Noted   • Ear lesion 11/10/2022   • Urinary tract infection with hematuria 11/10/2022   • Foreign body in skin of finger 11/10/2022   • Need for Tdap vaccination 11/10/2022   • Vasomotor symptoms due to menopause 07/11/2022   • Viral URI 03/07/2022   • History of postmenopausal bleeding 01/06/2022   • Hordeolum 11/22/2021   • Pigmented skin lesions 12/02/2019   • Screening for colorectal cancer 12/02/2019   • Vitamin D deficiency 08/28/2014   • Arthritis, degenerative 11/08/2013   • Chronic low back pain 11/08/2013     She  has a past surgical history that includes Dental surgery; TONSILECTOMY AND ADNOIDECTOMY; and Tubal ligation (Bilateral)  Her family history includes Diabetes type II in her other; Hypertension in her father and mother; No Known Problems in her daughter, paternal aunt, paternal grandfather, paternal grandmother, and sister; Other in her maternal grandfather and other; Stomach cancer in her maternal grandmother; Stroke in her mother  She  reports that she quit smoking about 4 years ago  Her smoking use included cigarettes  She started smoking about 48 years ago  She has a 52 50 pack-year smoking history  She has never used smokeless tobacco  She reports previous alcohol use  She reports that she does not use drugs    Current Outpatient Medications   Medication Sig Dispense Refill   • Acetylcysteine (NAC PO) Take by mouth in the morning     • Ascorbic Acid (vitamin C) 1000 MG tablet Take 1,000 mg by mouth daily     • calcium carbonate-vitamin D (OSCAL-D) 500 mg-200 units per tablet Take 1 tablet by mouth daily       • Calcium-Magnesium-Vitamin D (CALCIUM MAGNESIUM PO) Take by mouth With potassium     • cephalexin (KEFLEX) 500 mg capsule Take 1 capsule (500 mg total) by mouth every 12 (twelve) hours for 5 days 10 capsule 0   • chlorpheniramine (CHLOR-TRIMETON) 4 MG tablet Take 1 tablet (4 mg total) by mouth every 6 (six) hours as needed for rhinitis 30 tablet 0   • cholecalciferol (VITAMIN D3) 1,000 units tablet Take 1,000 Units by mouth daily     • fluticasone (FLONASE) 50 mcg/act nasal spray 2 sprays into each nostril daily 16 g 1   • HYDROcodone-acetaminophen (NORCO) 7 5-325 mg per tablet Take 1 tablet by mouth every 4 (four) hours as needed for pain Max Daily Amount: 6 tablets 120 tablet 0   • multivitamin (THERAGRAN) TABS Take 1 tablet by mouth daily     • Zinc Sulfate (ZINC 15 PO) Take 1 tablet by mouth in the morning       No current facility-administered medications for this visit  Current Outpatient Medications on File Prior to Visit   Medication Sig   • Acetylcysteine (NAC PO) Take by mouth in the morning   • Ascorbic Acid (vitamin C) 1000 MG tablet Take 1,000 mg by mouth daily   • calcium carbonate-vitamin D (OSCAL-D) 500 mg-200 units per tablet Take 1 tablet by mouth daily     • Calcium-Magnesium-Vitamin D (CALCIUM MAGNESIUM PO) Take by mouth With potassium   • chlorpheniramine (CHLOR-TRIMETON) 4 MG tablet Take 1 tablet (4 mg total) by mouth every 6 (six) hours as needed for rhinitis   • cholecalciferol (VITAMIN D3) 1,000 units tablet Take 1,000 Units by mouth daily   • fluticasone (FLONASE) 50 mcg/act nasal spray 2 sprays into each nostril daily   • HYDROcodone-acetaminophen (NORCO) 7 5-325 mg per tablet Take 1 tablet by mouth every 4 (four) hours as needed for pain Max Daily Amount: 6 tablets   • multivitamin (THERAGRAN) TABS Take 1 tablet by mouth daily   • Zinc Sulfate (ZINC 15 PO) Take 1 tablet by mouth in the morning     No current facility-administered medications on file prior to visit  She is allergic to codeine       Review of Systems   Constitutional: Negative for activity change, chills, fatigue, fever and unexpected weight change  HENT: Positive for hearing loss (Decreased hearing on the right with a clogged sensation)  Negative for ear pain, postnasal drip, rhinorrhea, sinus pressure and sore throat  Eyes: Negative for pain  Respiratory: Negative for cough, choking, chest tightness, shortness of breath and wheezing  Cardiovascular: Negative for chest pain, palpitations and leg swelling     Gastrointestinal: Positive for abdominal pain (suprapubic abd pain and pressure) and nausea (with occasional waves of nausea )  Negative for constipation, diarrhea and vomiting  Genitourinary: Positive for decreased urine volume, dysuria (with some urge incontinence), frequency (with nocturia) and urgency (with a feeling of incomplete emptying )  Negative for hematuria  Musculoskeletal: Positive for arthralgias (Foreign body in right index finger)  Negative for back pain, gait problem, joint swelling, myalgias and neck stiffness  Skin: Negative for pallor and rash  Neurological: Negative for dizziness, tremors, seizures, syncope, light-headedness and headaches  Hematological: Negative for adenopathy  Psychiatric/Behavioral: Negative for behavioral problems  Objective:      /82 (BP Location: Left arm, Patient Position: Sitting, Cuff Size: Standard)   Pulse 64   Temp 98 9 °F (37 2 °C) (Temporal)   Ht 5' 5 35" (1 66 m)   Wt 52 5 kg (115 lb 12 8 oz)   SpO2 98%   BMI 19 06 kg/m²          Physical Exam  Constitutional:       General: She is not in acute distress  Appearance: She is well-developed  She is not diaphoretic  HENT:      Head: Normocephalic and atraumatic  Right Ear: External ear normal  Decreased hearing (Decreased hearing on the right ) noted  Tympanic membrane is scarred ( scattered tympanic membrane on the right with and abnormal appearance and loss of the light reflex)  Left Ear: External ear normal       Nose: Nose normal       Mouth/Throat:      Mouth: Mucous membranes are dry  Pharynx: Posterior oropharyngeal erythema (Oropharyngeal erythema with dry mucous membranes) present  No oropharyngeal exudate  Eyes:      General: No scleral icterus  Right eye: No discharge  Left eye: No discharge  Conjunctiva/sclera: Conjunctivae normal       Pupils: Pupils are equal, round, and reactive to light  Neck:      Thyroid: No thyromegaly  Vascular: No JVD  Trachea: No tracheal deviation  Cardiovascular:      Rate and Rhythm: Normal rate and regular rhythm  Heart sounds: Normal heart sounds  No murmur heard  No friction rub  No gallop  Pulmonary:      Effort: Pulmonary effort is normal  No respiratory distress  Breath sounds: Normal breath sounds  No wheezing or rales  Chest:      Chest wall: No tenderness  Abdominal:      General: Bowel sounds are normal  There is no distension  Palpations: Abdomen is soft  There is no mass  Tenderness: There is no abdominal tenderness  There is no guarding or rebound  Musculoskeletal:         General: No deformity  Normal range of motion  Left hand: Tenderness ( splinter in the left index finger) present  Cervical back: Normal range of motion and neck supple  Lymphadenopathy:      Cervical: No cervical adenopathy  Skin:     General: Skin is warm and dry  Coloration: Skin is not pale  Findings: No erythema or rash  Neurological:      Mental Status: She is alert and oriented to person, place, and time  Cranial Nerves: No cranial nerve deficit  Motor: No abnormal muscle tone  Coordination: Coordination normal       Deep Tendon Reflexes: Reflexes are normal and symmetric     Psychiatric:         Behavior: Behavior normal              Office Visit on 11/10/2022   Component Date Value Ref Range Status   • LEUKOCYTE ESTERASE,UA 11/10/2022 small   Final   • NITRITE,UA 11/10/2022 neg   Final   • SL AMB POCT UROBILINOGEN 11/10/2022 0 2   Final   • POCT URINE PROTEIN 11/10/2022 30   Final   •  PH,UA 11/10/2022 6 5   Final   • BLOOD,UA 11/10/2022 moderate   Final   • SPECIFIC GRAVITY,UA 11/10/2022 1 025   Final   • KETONES,UA 11/10/2022 neg   Final   • BILIRUBIN,UA 11/10/2022 neg   Final   • GLUCOSE, UA 11/10/2022 neg   Final   •  COLOR,UA 11/10/2022 yellow   Final   • CLARITY,UA 11/10/2022 clear   Final

## 2022-11-11 ENCOUNTER — CLINICAL SUPPORT (OUTPATIENT)
Dept: INTERNAL MEDICINE CLINIC | Facility: CLINIC | Age: 57
End: 2022-11-11

## 2022-11-11 DIAGNOSIS — Z23 NEED FOR DIPHTHERIA-TETANUS-PERTUSSIS (TDAP) VACCINE: Primary | ICD-10-CM

## 2022-11-11 DIAGNOSIS — S60.459A FOREIGN BODY IN SKIN OF FINGER, INITIAL ENCOUNTER: ICD-10-CM

## 2022-11-13 LAB — BACTERIA UR CULT: ABNORMAL

## 2022-11-18 ENCOUNTER — OFFICE VISIT (OUTPATIENT)
Dept: INTERNAL MEDICINE CLINIC | Facility: CLINIC | Age: 57
End: 2022-11-18

## 2022-11-18 VITALS
TEMPERATURE: 98.2 F | HEIGHT: 66 IN | OXYGEN SATURATION: 98 % | DIASTOLIC BLOOD PRESSURE: 80 MMHG | BODY MASS INDEX: 18.35 KG/M2 | SYSTOLIC BLOOD PRESSURE: 130 MMHG | HEART RATE: 65 BPM | WEIGHT: 114.2 LBS

## 2022-11-18 DIAGNOSIS — E46 PROTEIN-CALORIE MALNUTRITION, UNSPECIFIED SEVERITY (HCC): ICD-10-CM

## 2022-11-18 DIAGNOSIS — R31.9 URINARY TRACT INFECTION WITH HEMATURIA, SITE UNSPECIFIED: Primary | ICD-10-CM

## 2022-11-18 DIAGNOSIS — N39.0 URINARY TRACT INFECTION WITH HEMATURIA, SITE UNSPECIFIED: Primary | ICD-10-CM

## 2022-11-18 LAB
SL AMB  POCT GLUCOSE, UA: NEGATIVE
SL AMB LEUKOCYTE ESTERASE,UA: NEGATIVE
SL AMB POCT BILIRUBIN,UA: NEGATIVE
SL AMB POCT BLOOD,UA: NORMAL
SL AMB POCT CLARITY,UA: CLEAR
SL AMB POCT COLOR,UA: YELLOW
SL AMB POCT KETONES,UA: NEGATIVE
SL AMB POCT NITRITE,UA: NEGATIVE
SL AMB POCT PH,UA: 7
SL AMB POCT SPECIFIC GRAVITY,UA: 1.01
SL AMB POCT URINE PROTEIN: NEGATIVE
SL AMB POCT UROBILINOGEN: NORMAL

## 2022-11-18 RX ORDER — PHENAZOPYRIDINE HYDROCHLORIDE 100 MG/1
100 TABLET, FILM COATED ORAL 3 TIMES DAILY PRN
Qty: 30 TABLET | Refills: 0 | Status: SHIPPED | OUTPATIENT
Start: 2022-11-18

## 2022-11-18 NOTE — ASSESSMENT & PLAN NOTE
Will send urine for culture  Hold off on repeat antibiotic as last contraindicated susceptibilities antibiotics she was treated with  Start Pyridium  Discussed red flag warning signs of patient

## 2022-11-18 NOTE — PROGRESS NOTES
Assessment/Plan:    Urinary tract infection with hematuria  Will send urine for culture  Hold off on repeat antibiotic as last contraindicated susceptibilities antibiotics she was treated with  Start Pyridium  Discussed red flag warning signs of patient  Diagnoses and all orders for this visit:    Urinary tract infection with hematuria, site unspecified  -     Urine culture  -     POCT urine dip  -     phenazopyridine (PYRIDIUM) 100 mg tablet; Take 1 tablet (100 mg total) by mouth 3 (three) times a day as needed for bladder spasms    Protein-calorie malnutrition, unspecified severity (HCC)          Subjective:      Patient ID: Maksim Felix is a 60 y o  female  Patient presents today for ongoing UTI like symptoms  She was seen on 11/10/22 for UTI and was treated with Keflex  Urine culture showed susceptible to Keflex  Denies history of kidney stones  The following portions of the patient's history were reviewed and updated as appropriate: allergies, current medications, past family history, past medical history, past social history, past surgical history and problem list     Review of Systems   Constitutional: Negative for activity change, appetite change, chills, diaphoresis and fever  HENT: Negative for congestion, ear discharge, ear pain, postnasal drip, rhinorrhea, sinus pressure, sinus pain and sore throat  Eyes: Negative for pain, discharge, itching and visual disturbance  Respiratory: Negative for cough, chest tightness, shortness of breath and wheezing  Cardiovascular: Negative for chest pain, palpitations and leg swelling  Gastrointestinal: Negative for abdominal pain, constipation, diarrhea, nausea and vomiting  Endocrine: Negative for polydipsia, polyphagia and polyuria  Genitourinary: Positive for frequency and urgency  Negative for difficulty urinating and dysuria          Urinary flow feels off    Musculoskeletal: Negative for arthralgias, back pain and neck pain  Skin: Negative for rash and wound  Neurological: Negative for dizziness, weakness, numbness and headaches           Past Medical History:   Diagnosis Date   • Carrier of disease     Carrier or suspected carrier of other venereal diseases: Hepatitis B (Previous exposure, cannot donate blood)   • Chronic low back pain    • Eustachian tube dysfunction    • Rotator cuff tendinitis          Current Outpatient Medications:   •  Acetylcysteine (NAC PO), Take by mouth in the morning, Disp: , Rfl:   •  Ascorbic Acid (vitamin C) 1000 MG tablet, Take 1,000 mg by mouth daily, Disp: , Rfl:   •  calcium carbonate-vitamin D (OSCAL-D) 500 mg-200 units per tablet, Take 1 tablet by mouth daily  , Disp: , Rfl:   •  Calcium-Magnesium-Vitamin D (CALCIUM MAGNESIUM PO), Take by mouth With potassium, Disp: , Rfl:   •  chlorpheniramine (CHLOR-TRIMETON) 4 MG tablet, Take 1 tablet (4 mg total) by mouth every 6 (six) hours as needed for rhinitis, Disp: 30 tablet, Rfl: 0  •  cholecalciferol (VITAMIN D3) 1,000 units tablet, Take 1,000 Units by mouth daily, Disp: , Rfl:   •  fluticasone (FLONASE) 50 mcg/act nasal spray, 2 sprays into each nostril daily, Disp: 16 g, Rfl: 1  •  HYDROcodone-acetaminophen (NORCO) 7 5-325 mg per tablet, Take 1 tablet by mouth every 4 (four) hours as needed for pain Max Daily Amount: 6 tablets, Disp: 120 tablet, Rfl: 0  •  multivitamin (THERAGRAN) TABS, Take 1 tablet by mouth daily, Disp: , Rfl:   •  phenazopyridine (PYRIDIUM) 100 mg tablet, Take 1 tablet (100 mg total) by mouth 3 (three) times a day as needed for bladder spasms, Disp: 30 tablet, Rfl: 0  •  Zinc Sulfate (ZINC 15 PO), Take 1 tablet by mouth in the morning, Disp: , Rfl:     Allergies   Allergen Reactions   • Codeine Nausea Only and Vomiting       Social History   Past Surgical History:   Procedure Laterality Date   • DENTAL SURGERY      Impacted wisdom teeth   • TONSILECTOMY AND ADNOIDECTOMY     • TUBAL LIGATION Bilateral      Family History   Problem Relation Age of Onset   • Hypertension Mother         Benign Essential   • Stroke Mother    • Hypertension Father         Benign Essential   • Other Maternal Grandfather         Cardiac disorder   • Other Other         Malignant neoplasm of stomach   • Diabetes type II Other    • No Known Problems Sister    • No Known Problems Daughter    • Stomach cancer Maternal Grandmother    • No Known Problems Paternal Grandmother    • No Known Problems Paternal Grandfather    • No Known Problems Paternal Aunt        Objective:  /80 (BP Location: Left arm, Patient Position: Sitting, Cuff Size: Standard)   Pulse 65   Temp 98 2 °F (36 8 °C) (Temporal)   Ht 5' 5 75" (1 67 m)   Wt 51 8 kg (114 lb 3 2 oz)   SpO2 98%   BMI 18 57 kg/m²     Recent Results (from the past 1344 hour(s))   POCT urine dip    Collection Time: 11/10/22  5:04 PM   Result Value Ref Range    LEUKOCYTE ESTERASE,UA small     NITRITE,UA neg     SL AMB POCT UROBILINOGEN 0 2     POCT URINE PROTEIN 30      PH,UA 6 5     BLOOD,UA moderate     SPECIFIC GRAVITY,UA 1 025     KETONES,UA neg     BILIRUBIN,UA neg     GLUCOSE, UA neg      COLOR,UA yellow     CLARITY,UA clear    UA w Reflex to Microscopic w Reflex to Culture - Clinic Collect    Collection Time: 11/10/22  5:05 PM    Specimen: Urine   Result Value Ref Range    Color, UA Yellow     Clarity, UA Extra Turbid     Specific [place], UA 1 016 1 003 - 1 030    pH, UA 7 0 4 5, 5 0, 5 5, 6 0, 6 5, 7 0, 7 5, 8 0    Leukocytes, UA Moderate (A) Negative    Nitrite, UA Negative Negative    Protein, UA Trace (A) Negative mg/dl    Glucose, UA Negative Negative mg/dl    Ketones, UA Negative Negative mg/dl    Urobilinogen, UA <2 0 <2 0 mg/dl mg/dl    Bilirubin, UA Negative Negative    Occult Blood, UA Large (A) Negative   Urine Microscopic    Collection Time: 11/10/22  5:05 PM   Result Value Ref Range    RBC, UA 20-30 (A) None Seen, 1-2 /hpf    WBC, UA 30-50 (A) None Seen, 1-2 /hpf    Epithelial Cells None Seen None Seen, Occasional /hpf    Bacteria, UA Moderate (A) None Seen, Occasional /hpf    MUCUS THREADS Innumerable (A) None Seen    Hyaline Casts, UA 0-3 (A) None Seen /lpf    Amorphous Crystals, UA Moderate     Ca Oxalate Jackie, UA Moderate (A) None Seen /hpf   Urine culture    Collection Time: 11/10/22  5:05 PM    Specimen: Urine   Result Value Ref Range    Urine Culture 70,000-79,000 cfu/ml Proteus mirabilis (A)        Susceptibility    Proteus mirabilis - LORI     ZID Performed Yes       Ampicillin ($$) <=8 00 Susceptible ug/ml     Aztreonam ($$$)  <=4 Susceptible ug/ml     Cefazolin ($) <=2 00 Susceptible ug/ml     Ciprofloxacin ($)  <=0 25 Susceptible ug/ml     Gentamicin ($$) <=2 Susceptible ug/ml     Levofloxacin ($) <=0 50 Susceptible ug/ml     Nitrofurantoin >64 Resistant ug/ml     Tetracycline >8 Resistant ug/ml     Tobramycin ($) <=2 Susceptible ug/ml     Trimethoprim + Sulfamethoxazole ($$$) <=0 5/9 5 Susceptible ug/ml   POCT urine dip    Collection Time: 11/18/22  1:42 PM   Result Value Ref Range    LEUKOCYTE ESTERASE,UA Negative     NITRITE,UA Negative     SL AMB POCT UROBILINOGEN 0 2 E U      POCT URINE PROTEIN Negative      PH,UA 7 0     BLOOD,UA Trace     SPECIFIC GRAVITY,UA 1 015     KETONES,UA Negative     BILIRUBIN,UA Negative     GLUCOSE, UA Negative      COLOR,UA yellow     CLARITY,UA clear             Physical Exam  Constitutional:       General: She is not in acute distress  Appearance: She is well-developed and well-nourished  She is not diaphoretic  HENT:      Head: Normocephalic and atraumatic  Right Ear: External ear normal       Left Ear: External ear normal       Nose: Nose normal       Mouth/Throat:      Mouth: Oropharynx is clear and moist       Pharynx: No oropharyngeal exudate  Eyes:      General:         Right eye: No discharge  Left eye: No discharge        Extraocular Movements: EOM normal       Conjunctiva/sclera: Conjunctivae normal       Pupils: Pupils are equal, round, and reactive to light  Neck:      Thyroid: No thyromegaly  Cardiovascular:      Rate and Rhythm: Normal rate and regular rhythm  Pulses: Intact distal pulses  Heart sounds: Normal heart sounds  No murmur heard  No friction rub  No gallop  Pulmonary:      Effort: Pulmonary effort is normal  No respiratory distress  Breath sounds: Normal breath sounds  No stridor  No wheezing or rales  Abdominal:      General: Bowel sounds are normal  There is no distension  Palpations: Abdomen is soft  Tenderness: There is no abdominal tenderness  Musculoskeletal:      Cervical back: Normal range of motion and neck supple  Lymphadenopathy:      Cervical: No cervical adenopathy  Skin:     General: Skin is warm and dry  Findings: No erythema or rash  Neurological:      Mental Status: She is alert and oriented to person, place, and time  Psychiatric:         Mood and Affect: Mood and affect normal          Behavior: Behavior normal          Thought Content:  Thought content normal          Judgment: Judgment normal

## 2022-11-22 ENCOUNTER — TELEPHONE (OUTPATIENT)
Dept: INTERNAL MEDICINE CLINIC | Facility: CLINIC | Age: 57
End: 2022-11-22

## 2022-11-22 NOTE — TELEPHONE ENCOUNTER
Patient needs an insurance referral to ENT Dr Ballesteros's office  NPI# [id number]  The refferal is to consult and treat  Patient has [employer]/[facility] insurance

## 2022-12-12 PROBLEM — J06.9 VIRAL URI: Status: RESOLVED | Noted: 2022-03-07 | Resolved: 2022-12-12

## 2022-12-15 DIAGNOSIS — G89.29 CHRONIC MIDLINE LOW BACK PAIN WITHOUT SCIATICA: ICD-10-CM

## 2022-12-15 DIAGNOSIS — M54.50 CHRONIC MIDLINE LOW BACK PAIN WITHOUT SCIATICA: ICD-10-CM

## 2022-12-16 RX ORDER — HYDROCODONE BITARTRATE AND ACETAMINOPHEN 7.5; 325 MG/1; MG/1
1 TABLET ORAL EVERY 4 HOURS PRN
Qty: 120 TABLET | Refills: 0 | Status: SHIPPED | OUTPATIENT
Start: 2022-12-16

## 2023-01-09 PROBLEM — R31.9 URINARY TRACT INFECTION WITH HEMATURIA: Status: RESOLVED | Noted: 2022-11-10 | Resolved: 2023-01-09

## 2023-01-09 PROBLEM — N39.0 URINARY TRACT INFECTION WITH HEMATURIA: Status: RESOLVED | Noted: 2022-11-10 | Resolved: 2023-01-09

## 2023-01-13 ENCOUNTER — OFFICE VISIT (OUTPATIENT)
Dept: INTERNAL MEDICINE CLINIC | Facility: CLINIC | Age: 58
End: 2023-01-13

## 2023-01-13 VITALS
BODY MASS INDEX: 18.51 KG/M2 | OXYGEN SATURATION: 95 % | TEMPERATURE: 97.7 F | DIASTOLIC BLOOD PRESSURE: 62 MMHG | HEART RATE: 57 BPM | WEIGHT: 115.2 LBS | HEIGHT: 66 IN | SYSTOLIC BLOOD PRESSURE: 102 MMHG

## 2023-01-13 DIAGNOSIS — R31.29 MICROSCOPIC HEMATURIA: ICD-10-CM

## 2023-01-13 DIAGNOSIS — Z12.11 SCREENING FOR COLORECTAL CANCER: ICD-10-CM

## 2023-01-13 DIAGNOSIS — M47.817 SPONDYLOSIS OF LUMBOSACRAL REGION WITHOUT MYELOPATHY OR RADICULOPATHY: Primary | ICD-10-CM

## 2023-01-13 DIAGNOSIS — Z12.12 SCREENING FOR COLORECTAL CANCER: ICD-10-CM

## 2023-01-13 DIAGNOSIS — G89.29 CHRONIC MIDLINE LOW BACK PAIN WITHOUT SCIATICA: ICD-10-CM

## 2023-01-13 DIAGNOSIS — Z87.42 HISTORY OF POSTMENOPAUSAL BLEEDING: ICD-10-CM

## 2023-01-13 DIAGNOSIS — M54.50 CHRONIC MIDLINE LOW BACK PAIN WITHOUT SCIATICA: ICD-10-CM

## 2023-01-13 LAB
SL AMB  POCT GLUCOSE, UA: NORMAL
SL AMB LEUKOCYTE ESTERASE,UA: NORMAL
SL AMB POCT BILIRUBIN,UA: NORMAL
SL AMB POCT BLOOD,UA: NORMAL
SL AMB POCT CLARITY,UA: CLEAR
SL AMB POCT COLOR,UA: YELLOW
SL AMB POCT KETONES,UA: NORMAL
SL AMB POCT NITRITE,UA: NORMAL
SL AMB POCT PH,UA: 6
SL AMB POCT SPECIFIC GRAVITY,UA: 1.02
SL AMB POCT URINE PROTEIN: NORMAL
SL AMB POCT UROBILINOGEN: 0.2

## 2023-01-13 RX ORDER — HYDROCODONE BITARTRATE AND ACETAMINOPHEN 7.5; 325 MG/1; MG/1
1 TABLET ORAL EVERY 4 HOURS PRN
Qty: 120 TABLET | Refills: 0 | Status: SHIPPED | OUTPATIENT
Start: 2023-01-13

## 2023-01-13 NOTE — ASSESSMENT & PLAN NOTE
We will check a urinalysis to determine whether or not there could be an element of hematuria that is being mistaken for postmenopausal bleeding

## 2023-01-13 NOTE — ASSESSMENT & PLAN NOTE
Continues with periodic use of hydrocodone for pain    Dosage has remained stable and provides significant relief for the patient

## 2023-01-13 NOTE — PROGRESS NOTES
Name: Dolly Suárez      : 1965      MRN: [id number]  Encounter Provider: Anita Menchaca MD  Encounter Date: 2023   Encounter department: [address] [place] [address]  Spondylosis of lumbosacral region without myelopathy or radiculopathy  Assessment & Plan:  Degenerative arthritis of the lower lumbar spine  Orders:  -     HYDROcodone-acetaminophen (NORCO) 7 5-325 mg per tablet; Take 1 tablet by mouth every 4 (four) hours as needed for pain Max Daily Amount: 6 tablets  -     CBC and differential; Future  -     Comprehensive metabolic panel; Future  -     Lipid panel; Future    2  Chronic midline low back pain without sciatica  Assessment & Plan:  Continues with periodic use of hydrocodone for pain  Dosage has remained stable and provides significant relief for the patient    Orders:  -     HYDROcodone-acetaminophen (NORCO) 7 5-325 mg per tablet; Take 1 tablet by mouth every 4 (four) hours as needed for pain Max Daily Amount: 6 tablets  -     Ambulatory referral for colonoscopy; Future  -     CBC and differential; Future  -     Comprehensive metabolic panel; Future  -     Lipid panel; Future    3  Screening for colorectal cancer  Assessment & Plan:  Given a referral for colonoscopy  Orders:  -     HYDROcodone-acetaminophen (NORCO) 7 5-325 mg per tablet; Take 1 tablet by mouth every 4 (four) hours as needed for pain Max Daily Amount: 6 tablets  -     CBC and differential; Future  -     Comprehensive metabolic panel; Future  -     Lipid panel; Future    4  History of postmenopausal bleeding  Assessment & Plan: We will check a urinalysis to determine whether or not there could be an element of hematuria that is being mistaken for postmenopausal bleeding  Subjective        Patient presents to the office for follow-up visit  There have been no major changes in her status    She continues to deal with a chronic low-grade pain from mild degenerative osteoarthritis of lumbar spine  She continues to experience episodic vaginal spotting ever since receiving her most recent COVID booster  No history of fevers or chills  No history of dysuria    Review of Systems   Constitutional: Negative  HENT: Negative  Eyes: Negative  Respiratory: Negative  Cardiovascular: Negative  Gastrointestinal: Negative  Endocrine: Negative  Genitourinary: Positive for vaginal bleeding ( vaginal spotting)  Musculoskeletal: Positive for back pain  Skin: Negative  Allergic/Immunologic: Negative  Neurological: Negative  Hematological: Negative  Psychiatric/Behavioral: Negative          Current Outpatient Medications on File Prior to Visit   Medication Sig   • Acetylcysteine (NAC PO) Take by mouth in the morning   • Ascorbic Acid (vitamin C) 1000 MG tablet Take 1,000 mg by mouth daily   • Calcium-Magnesium-Vitamin D (CALCIUM MAGNESIUM PO) Take by mouth With potassium   • cholecalciferol (VITAMIN D3) 1,000 units tablet Take 1,000 Units by mouth daily   • multivitamin (THERAGRAN) TABS Take 1 tablet by mouth daily   • Zinc Sulfate (ZINC 15 PO) Take 1 tablet by mouth in the morning   • [DISCONTINUED] HYDROcodone-acetaminophen (NORCO) 7 5-325 mg per tablet Take 1 tablet by mouth every 4 (four) hours as needed for pain Max Daily Amount: 6 tablets   • [DISCONTINUED] calcium carbonate-vitamin D (OSCAL-D) 500 mg-200 units per tablet Take 1 tablet by mouth daily     • [DISCONTINUED] chlorpheniramine (CHLOR-TRIMETON) 4 MG tablet Take 1 tablet (4 mg total) by mouth every 6 (six) hours as needed for rhinitis   • [DISCONTINUED] fluticasone (FLONASE) 50 mcg/act nasal spray 2 sprays into each nostril daily   • [DISCONTINUED] phenazopyridine (PYRIDIUM) 100 mg tablet Take 1 tablet (100 mg total) by mouth 3 (three) times a day as needed for bladder spasms       Objective     /62 (BP Location: Left arm, Patient Position: Sitting, Cuff Size: Standard) Pulse 57   Temp 97 7 °F (36 5 °C) (Tympanic)   Ht 5' 6" (1 676 m)   Wt 52 3 kg (115 lb 3 2 oz)   SpO2 95%   BMI 18 59 kg/m²     Physical Exam  Vitals reviewed  Constitutional:       General: She is not in acute distress  Appearance: Normal appearance  She is normal weight  She is not ill-appearing, toxic-appearing or diaphoretic  HENT:      Head: Normocephalic and atraumatic  Right Ear: External ear normal       Left Ear: External ear normal       Nose: Nose normal    Eyes:      Conjunctiva/sclera: Conjunctivae normal       Pupils: Pupils are equal, round, and reactive to light  Cardiovascular:      Rate and Rhythm: Normal rate and regular rhythm  Pulses: Normal pulses  Heart sounds: Normal heart sounds  No murmur heard  Pulmonary:      Effort: Pulmonary effort is normal  No respiratory distress  Breath sounds: Normal breath sounds  No wheezing or rales  Abdominal:      General: Abdomen is flat  There is no distension  Musculoskeletal:         General: No swelling  Cervical back: Neck supple  No rigidity  Right lower leg: No edema  Left lower leg: No edema  Skin:     General: Skin is warm  Capillary Refill: Capillary refill takes less than 2 seconds  Coloration: Skin is not jaundiced  Findings: No bruising, erythema or rash  Neurological:      General: No focal deficit present  Mental Status: She is alert and oriented to person, place, and time  Mental status is at baseline     Psychiatric:         Mood and Affect: Mood normal          Behavior: Behavior normal        Bradford Benson MD

## 2023-01-13 NOTE — ASSESSMENT & PLAN NOTE
Degenerative arthritis of the lower lumbar spine  Symptomatic treatment  Encouraged low back exercises

## 2023-01-15 LAB
APPEARANCE UR: CLEAR
BACTERIA UR QL AUTO: ABNORMAL /HPF
BILIRUB UR QL STRIP: NEGATIVE
COLOR UR: YELLOW
GLUCOSE UR QL STRIP: NEGATIVE
HGB UR QL STRIP: NEGATIVE
HYALINE CASTS #/AREA URNS LPF: ABNORMAL /LPF
KETONES UR QL STRIP: NEGATIVE
LEUKOCYTE ESTERASE UR QL STRIP: ABNORMAL
NITRITE UR QL STRIP: NEGATIVE
PH UR STRIP: 6.5 [PH] (ref 5–8)
PROT UR QL STRIP: NEGATIVE
RBC #/AREA URNS HPF: ABNORMAL /HPF
SP GR UR STRIP: 1.01 (ref 1–1.03)
SQUAMOUS #/AREA URNS HPF: ABNORMAL /HPF
WBC #/AREA URNS HPF: ABNORMAL /HPF

## 2023-02-03 LAB
ALBUMIN SERPL-MCNC: 4.8 G/DL (ref 3.6–5.1)
ALBUMIN/GLOB SERPL: 2.2 (CALC) (ref 1–2.5)
ALP SERPL-CCNC: 53 U/L (ref 37–153)
ALT SERPL-CCNC: 14 U/L (ref 6–29)
AST SERPL-CCNC: 18 U/L (ref 10–35)
BASOPHILS # BLD AUTO: 69 CELLS/UL (ref 0–200)
BASOPHILS NFR BLD AUTO: 1.1 %
BILIRUB SERPL-MCNC: 0.6 MG/DL (ref 0.2–1.2)
BUN SERPL-MCNC: 15 MG/DL (ref 7–25)
BUN/CREAT SERPL: ABNORMAL (CALC) (ref 6–22)
CALCIUM SERPL-MCNC: 9.8 MG/DL (ref 8.6–10.4)
CHLORIDE SERPL-SCNC: 105 MMOL/L (ref 98–110)
CHOLEST SERPL-MCNC: 252 MG/DL
CHOLEST/HDLC SERPL: 3.2 (CALC)
CO2 SERPL-SCNC: 30 MMOL/L (ref 20–32)
CREAT SERPL-MCNC: 0.76 MG/DL (ref 0.5–1.03)
EOSINOPHIL # BLD AUTO: 88 CELLS/UL (ref 15–500)
EOSINOPHIL NFR BLD AUTO: 1.4 %
ERYTHROCYTE [DISTWIDTH] IN BLOOD BY AUTOMATED COUNT: 12.4 % (ref 11–15)
GFR/BSA.PRED SERPLBLD CYS-BASED-ARV: 90 ML/MIN/1.73M2
GLOBULIN SER CALC-MCNC: 2.2 G/DL (CALC) (ref 1.9–3.7)
GLUCOSE SERPL-MCNC: 109 MG/DL (ref 65–99)
HCT VFR BLD AUTO: 45.8 % (ref 35–45)
HDLC SERPL-MCNC: 80 MG/DL
HGB BLD-MCNC: 15.6 G/DL (ref 11.7–15.5)
LDLC SERPL CALC-MCNC: 147 MG/DL (CALC)
LYMPHOCYTES # BLD AUTO: 2085 CELLS/UL (ref 850–3900)
LYMPHOCYTES NFR BLD AUTO: 33.1 %
MCH RBC QN AUTO: 31.2 PG (ref 27–33)
MCHC RBC AUTO-ENTMCNC: 34.1 G/DL (ref 32–36)
MCV RBC AUTO: 91.6 FL (ref 80–100)
MONOCYTES # BLD AUTO: 536 CELLS/UL (ref 200–950)
MONOCYTES NFR BLD AUTO: 8.5 %
NEUTROPHILS # BLD AUTO: 3522 CELLS/UL (ref 1500–7800)
NEUTROPHILS NFR BLD AUTO: 55.9 %
NONHDLC SERPL-MCNC: 172 MG/DL (CALC)
PLATELET # BLD AUTO: 251 THOUSAND/UL (ref 140–400)
PMV BLD REES-ECKER: 9.9 FL (ref 7.5–12.5)
POTASSIUM SERPL-SCNC: 4.5 MMOL/L (ref 3.5–5.3)
PROT SERPL-MCNC: 7 G/DL (ref 6.1–8.1)
RBC # BLD AUTO: 5 MILLION/UL (ref 3.8–5.1)
SODIUM SERPL-SCNC: 142 MMOL/L (ref 135–146)
TRIGL SERPL-MCNC: 126 MG/DL
WBC # BLD AUTO: 6.3 THOUSAND/UL (ref 3.8–10.8)

## 2023-02-22 DIAGNOSIS — Z12.11 SCREENING FOR COLORECTAL CANCER: ICD-10-CM

## 2023-02-22 DIAGNOSIS — M54.50 CHRONIC MIDLINE LOW BACK PAIN WITHOUT SCIATICA: ICD-10-CM

## 2023-02-22 DIAGNOSIS — M47.817 SPONDYLOSIS OF LUMBOSACRAL REGION WITHOUT MYELOPATHY OR RADICULOPATHY: ICD-10-CM

## 2023-02-22 DIAGNOSIS — G89.29 CHRONIC MIDLINE LOW BACK PAIN WITHOUT SCIATICA: ICD-10-CM

## 2023-02-22 DIAGNOSIS — Z12.12 SCREENING FOR COLORECTAL CANCER: ICD-10-CM

## 2023-02-23 RX ORDER — HYDROCODONE BITARTRATE AND ACETAMINOPHEN 7.5; 325 MG/1; MG/1
1 TABLET ORAL EVERY 4 HOURS PRN
Qty: 120 TABLET | Refills: 0 | Status: SHIPPED | OUTPATIENT
Start: 2023-02-23

## 2023-03-24 DIAGNOSIS — G89.29 CHRONIC MIDLINE LOW BACK PAIN WITHOUT SCIATICA: ICD-10-CM

## 2023-03-24 DIAGNOSIS — M47.817 SPONDYLOSIS OF LUMBOSACRAL REGION WITHOUT MYELOPATHY OR RADICULOPATHY: ICD-10-CM

## 2023-03-24 DIAGNOSIS — Z12.12 SCREENING FOR COLORECTAL CANCER: ICD-10-CM

## 2023-03-24 DIAGNOSIS — Z12.11 SCREENING FOR COLORECTAL CANCER: ICD-10-CM

## 2023-03-24 DIAGNOSIS — M54.50 CHRONIC MIDLINE LOW BACK PAIN WITHOUT SCIATICA: ICD-10-CM

## 2023-03-24 RX ORDER — HYDROCODONE BITARTRATE AND ACETAMINOPHEN 7.5; 325 MG/1; MG/1
1 TABLET ORAL EVERY 4 HOURS PRN
Qty: 120 TABLET | Refills: 0 | Status: SHIPPED | OUTPATIENT
Start: 2023-03-24

## 2023-04-18 PROBLEM — H00.019 HORDEOLUM: Status: RESOLVED | Noted: 2021-11-22 | Resolved: 2023-04-18

## 2023-04-18 PROBLEM — E46 PROTEIN-CALORIE MALNUTRITION, UNSPECIFIED SEVERITY (HCC): Status: RESOLVED | Noted: 2022-11-18 | Resolved: 2023-04-18

## 2023-04-18 PROBLEM — E78.00 HYPERCHOLESTEROLEMIA: Status: ACTIVE | Noted: 2023-04-18

## 2023-05-02 DIAGNOSIS — Z12.12 SCREENING FOR COLORECTAL CANCER: ICD-10-CM

## 2023-05-02 DIAGNOSIS — M47.817 SPONDYLOSIS OF LUMBOSACRAL REGION WITHOUT MYELOPATHY OR RADICULOPATHY: ICD-10-CM

## 2023-05-02 DIAGNOSIS — Z12.11 SCREENING FOR COLORECTAL CANCER: ICD-10-CM

## 2023-05-02 DIAGNOSIS — G89.29 CHRONIC MIDLINE LOW BACK PAIN WITHOUT SCIATICA: ICD-10-CM

## 2023-05-02 DIAGNOSIS — M54.50 CHRONIC MIDLINE LOW BACK PAIN WITHOUT SCIATICA: ICD-10-CM

## 2023-05-03 RX ORDER — HYDROCODONE BITARTRATE AND ACETAMINOPHEN 7.5; 325 MG/1; MG/1
1 TABLET ORAL EVERY 4 HOURS PRN
Qty: 120 TABLET | Refills: 0 | Status: SHIPPED | OUTPATIENT
Start: 2023-05-03

## 2023-06-05 DIAGNOSIS — Z12.11 SCREENING FOR COLORECTAL CANCER: ICD-10-CM

## 2023-06-05 DIAGNOSIS — M47.817 SPONDYLOSIS OF LUMBOSACRAL REGION WITHOUT MYELOPATHY OR RADICULOPATHY: ICD-10-CM

## 2023-06-05 DIAGNOSIS — M54.50 CHRONIC MIDLINE LOW BACK PAIN WITHOUT SCIATICA: ICD-10-CM

## 2023-06-05 DIAGNOSIS — G89.29 CHRONIC MIDLINE LOW BACK PAIN WITHOUT SCIATICA: ICD-10-CM

## 2023-06-05 DIAGNOSIS — Z12.12 SCREENING FOR COLORECTAL CANCER: ICD-10-CM

## 2023-06-06 RX ORDER — HYDROCODONE BITARTRATE AND ACETAMINOPHEN 7.5; 325 MG/1; MG/1
1 TABLET ORAL EVERY 4 HOURS PRN
Qty: 120 TABLET | Refills: 0 | Status: SHIPPED | OUTPATIENT
Start: 2023-06-06

## 2023-06-17 PROBLEM — Z12.11 ENCOUNTER FOR SCREENING FOR MALIGNANT NEOPLASM OF COLON: Status: RESOLVED | Noted: 2019-12-02 | Resolved: 2023-06-17

## 2023-07-07 DIAGNOSIS — M47.817 SPONDYLOSIS OF LUMBOSACRAL REGION WITHOUT MYELOPATHY OR RADICULOPATHY: ICD-10-CM

## 2023-07-07 DIAGNOSIS — Z12.11 SCREENING FOR COLORECTAL CANCER: ICD-10-CM

## 2023-07-07 DIAGNOSIS — M54.50 CHRONIC MIDLINE LOW BACK PAIN WITHOUT SCIATICA: ICD-10-CM

## 2023-07-07 DIAGNOSIS — Z12.12 SCREENING FOR COLORECTAL CANCER: ICD-10-CM

## 2023-07-07 DIAGNOSIS — G89.29 CHRONIC MIDLINE LOW BACK PAIN WITHOUT SCIATICA: ICD-10-CM

## 2023-07-07 RX ORDER — HYDROCODONE BITARTRATE AND ACETAMINOPHEN 7.5; 325 MG/1; MG/1
1 TABLET ORAL EVERY 4 HOURS PRN
Qty: 120 TABLET | Refills: 0 | Status: SHIPPED | OUTPATIENT
Start: 2023-07-07

## 2023-07-24 ENCOUNTER — OFFICE VISIT (OUTPATIENT)
Dept: INTERNAL MEDICINE CLINIC | Facility: CLINIC | Age: 58
End: 2023-07-24
Payer: COMMERCIAL

## 2023-07-24 VITALS
SYSTOLIC BLOOD PRESSURE: 96 MMHG | WEIGHT: 115 LBS | TEMPERATURE: 98.5 F | HEIGHT: 66 IN | HEART RATE: 82 BPM | OXYGEN SATURATION: 98 % | BODY MASS INDEX: 18.48 KG/M2 | DIASTOLIC BLOOD PRESSURE: 68 MMHG

## 2023-07-24 DIAGNOSIS — M47.817 SPONDYLOSIS OF LUMBOSACRAL REGION WITHOUT MYELOPATHY OR RADICULOPATHY: ICD-10-CM

## 2023-07-24 DIAGNOSIS — E78.00 HYPERCHOLESTEROLEMIA: ICD-10-CM

## 2023-07-24 DIAGNOSIS — G89.29 CHRONIC MIDLINE LOW BACK PAIN WITHOUT SCIATICA: ICD-10-CM

## 2023-07-24 DIAGNOSIS — M54.50 CHRONIC MIDLINE LOW BACK PAIN WITHOUT SCIATICA: ICD-10-CM

## 2023-07-24 DIAGNOSIS — Z12.11 ENCOUNTER FOR SCREENING FOR MALIGNANT NEOPLASM OF COLON: ICD-10-CM

## 2023-07-24 DIAGNOSIS — Z12.31 ENCOUNTER FOR SCREENING MAMMOGRAM FOR MALIGNANT NEOPLASM OF BREAST: Primary | ICD-10-CM

## 2023-07-24 PROBLEM — S60.459A FOREIGN BODY IN SKIN OF FINGER: Status: RESOLVED | Noted: 2022-11-10 | Resolved: 2023-07-24

## 2023-07-24 PROCEDURE — 99213 OFFICE O/P EST LOW 20 MIN: CPT | Performed by: INTERNAL MEDICINE

## 2023-07-24 NOTE — PROGRESS NOTES
Name: Hildegarde Duane      : 1965      MRN: [id number]  Encounter Provider: Mel Hurt MD  Encounter Date: 2023   Encounter department: [address] [place] [address]     1. Encounter for screening mammogram for malignant neoplasm of breast  -     Mammo screening bilateral w 3d & cad; Future; Expected date: 2023    2. Encounter for screening for malignant neoplasm of colon  -     Ambulatory Referral to Gastroenterology; Future    3. 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  She does have an inversion table which she is going to begin utilizing      4. Chronic midline low back pain without sciatica  Assessment & Plan:  Patient exacerbation due to airline flight and driving on a vacation out [id number] [place] [address]      5. Hypercholesterolemia  Assessment & Plan:  Follow-up lipid profile scheduled for       Patient presents for follow-up visit after recent trip out to [place]. She is complaining of some increased back pain secondary to the airline flight and to running the area in an automobile for extended hours. She denies any sciatic type pain. She is overdue for mammography and colonoscopy    Review of Systems   Constitutional: Negative. HENT: Negative. Eyes: Negative. Respiratory: Negative. Cardiovascular: Negative. Gastrointestinal: Negative. Endocrine: Negative. Genitourinary: Negative. Musculoskeletal: Positive for back pain. Skin: Negative. Allergic/Immunologic: Negative. Neurological: Negative. Hematological: Negative. Psychiatric/Behavioral: Negative.         Current Outpatient Medications on File Prior to Visit   Medication Sig   • Ascorbic Acid (vitamin C) 1000 MG tablet Take 1,000 mg by mouth daily   • Calcium-Magnesium-Vitamin D (CALCIUM MAGNESIUM PO) Take by mouth With potassium   • cholecalciferol (VITAMIN D3) 1,000 units tablet Take 1,000 Units by mouth daily   • HYDROcodone-acetaminophen (NORCO) 7.5-325 mg per tablet Take 1 tablet by mouth every 4 (four) hours as needed for pain Max Daily Amount: 6 tablets   • multivitamin (THERAGRAN) TABS Take 1 tablet by mouth daily   • Zinc Sulfate (ZINC 15 PO) Take 1 tablet by mouth in the morning       Objective     BP 96/68 (BP Location: Left arm, Patient Position: Sitting, Cuff Size: Adult)   Pulse 82   Temp 98.5 °F (36.9 °C) (Temporal)   Ht 5' 5.75" (1.67 m)   Wt 52.2 kg (115 lb)   SpO2 98% Comment: ra  BMI 18.70 kg/m²     Physical Exam  Vitals reviewed. Constitutional:       General: She is not in acute distress. Appearance: Normal appearance. She is normal weight. She is not ill-appearing, toxic-appearing or diaphoretic. HENT:      Head: Normocephalic and atraumatic. Right Ear: External ear normal.      Left Ear: External ear normal.   Eyes:      General: No scleral icterus. Conjunctiva/sclera: Conjunctivae normal.      Pupils: Pupils are equal, round, and reactive to light. Cardiovascular:      Rate and Rhythm: Normal rate and regular rhythm. Pulses: Normal pulses. Heart sounds: Normal heart sounds. No murmur heard. Pulmonary:      Effort: Pulmonary effort is normal. No respiratory distress. Breath sounds: Normal breath sounds. No wheezing or rales. Abdominal:      General: Abdomen is flat. There is no distension. Musculoskeletal:         General: No swelling. Cervical back: Neck supple. No rigidity or tenderness. Right lower leg: No edema. Left lower leg: No edema. Skin:     General: Skin is warm. Coloration: Skin is not jaundiced. Findings: No bruising, erythema or rash. Neurological:      General: No focal deficit present. Mental Status: She is alert and oriented to person, place, and time. Mental status is at baseline.    Psychiatric:         Mood and Affect: Mood normal.         Behavior: Behavior normal.       Jose Alberto Grande MD

## 2023-07-24 NOTE — ASSESSMENT & PLAN NOTE
Chronic low back pain worsened after recent trip out [address] [place].  She does have an inversion table which she is going to begin utilizing

## 2023-08-02 ENCOUNTER — VBI (OUTPATIENT)
Dept: ADMINISTRATIVE | Facility: OTHER | Age: 58
End: 2023-08-02

## 2023-08-09 DIAGNOSIS — Z12.11 SCREENING FOR COLORECTAL CANCER: ICD-10-CM

## 2023-08-09 DIAGNOSIS — G89.29 CHRONIC MIDLINE LOW BACK PAIN WITHOUT SCIATICA: ICD-10-CM

## 2023-08-09 DIAGNOSIS — Z12.12 SCREENING FOR COLORECTAL CANCER: ICD-10-CM

## 2023-08-09 DIAGNOSIS — M47.817 SPONDYLOSIS OF LUMBOSACRAL REGION WITHOUT MYELOPATHY OR RADICULOPATHY: ICD-10-CM

## 2023-08-09 DIAGNOSIS — M54.50 CHRONIC MIDLINE LOW BACK PAIN WITHOUT SCIATICA: ICD-10-CM

## 2023-08-10 RX ORDER — HYDROCODONE BITARTRATE AND ACETAMINOPHEN 7.5; 325 MG/1; MG/1
1 TABLET ORAL EVERY 4 HOURS PRN
Qty: 120 TABLET | Refills: 0 | Status: SHIPPED | OUTPATIENT
Start: 2023-08-10

## 2023-09-14 DIAGNOSIS — Z12.12 SCREENING FOR COLORECTAL CANCER: ICD-10-CM

## 2023-09-14 DIAGNOSIS — Z12.11 SCREENING FOR COLORECTAL CANCER: ICD-10-CM

## 2023-09-14 DIAGNOSIS — M47.817 SPONDYLOSIS OF LUMBOSACRAL REGION WITHOUT MYELOPATHY OR RADICULOPATHY: ICD-10-CM

## 2023-09-14 DIAGNOSIS — M54.50 CHRONIC MIDLINE LOW BACK PAIN WITHOUT SCIATICA: ICD-10-CM

## 2023-09-14 DIAGNOSIS — G89.29 CHRONIC MIDLINE LOW BACK PAIN WITHOUT SCIATICA: ICD-10-CM

## 2023-09-14 RX ORDER — HYDROCODONE BITARTRATE AND ACETAMINOPHEN 7.5; 325 MG/1; MG/1
1 TABLET ORAL EVERY 4 HOURS PRN
Qty: 120 TABLET | Refills: 0 | Status: SHIPPED | OUTPATIENT
Start: 2023-09-14

## 2023-09-14 NOTE — TELEPHONE ENCOUNTER
9/14/23--  last seen 7/24/23, next appointment 10/23/23, last filled 8/10/23- amt 120/0- one q 4h prn, , checked the PDMP   DR TAYLOR IS OUT OF THE OFFICE ON VACATION PLEASE ADDRESS

## 2023-09-20 ENCOUNTER — VBI (OUTPATIENT)
Dept: ADMINISTRATIVE | Facility: OTHER | Age: 58
End: 2023-09-20

## 2023-09-20 NOTE — TELEPHONE ENCOUNTER
09/20/23 12:19 PM    The patient was called and a message was left to call the provider's office. Please review Cologuard completion with the patient. Thank you.   Lorena Fernandez  [facility]

## 2023-10-16 DIAGNOSIS — M47.817 SPONDYLOSIS OF LUMBOSACRAL REGION WITHOUT MYELOPATHY OR RADICULOPATHY: ICD-10-CM

## 2023-10-16 DIAGNOSIS — G89.29 CHRONIC MIDLINE LOW BACK PAIN WITHOUT SCIATICA: ICD-10-CM

## 2023-10-16 DIAGNOSIS — M54.50 CHRONIC MIDLINE LOW BACK PAIN WITHOUT SCIATICA: ICD-10-CM

## 2023-10-16 DIAGNOSIS — Z12.12 SCREENING FOR COLORECTAL CANCER: ICD-10-CM

## 2023-10-16 DIAGNOSIS — Z12.11 SCREENING FOR COLORECTAL CANCER: ICD-10-CM

## 2023-10-16 RX ORDER — HYDROCODONE BITARTRATE AND ACETAMINOPHEN 7.5; 325 MG/1; MG/1
1 TABLET ORAL EVERY 4 HOURS PRN
Qty: 120 TABLET | Refills: 0 | Status: SHIPPED | OUTPATIENT
Start: 2023-10-16

## 2023-10-23 ENCOUNTER — OFFICE VISIT (OUTPATIENT)
Age: 58
End: 2023-10-23
Payer: COMMERCIAL

## 2023-10-23 VITALS
OXYGEN SATURATION: 97 % | WEIGHT: 118.8 LBS | DIASTOLIC BLOOD PRESSURE: 82 MMHG | HEIGHT: 66 IN | TEMPERATURE: 98.9 F | SYSTOLIC BLOOD PRESSURE: 120 MMHG | HEART RATE: 56 BPM | BODY MASS INDEX: 19.09 KG/M2

## 2023-10-23 DIAGNOSIS — G89.29 CHRONIC MIDLINE LOW BACK PAIN WITHOUT SCIATICA: ICD-10-CM

## 2023-10-23 DIAGNOSIS — Z12.12 ENCOUNTER FOR COLORECTAL CANCER SCREENING: Primary | ICD-10-CM

## 2023-10-23 DIAGNOSIS — Z12.11 ENCOUNTER FOR COLORECTAL CANCER SCREENING: Primary | ICD-10-CM

## 2023-10-23 DIAGNOSIS — F11.90 CHRONIC, CONTINUOUS USE OF OPIOIDS: ICD-10-CM

## 2023-10-23 DIAGNOSIS — M54.50 CHRONIC MIDLINE LOW BACK PAIN WITHOUT SCIATICA: ICD-10-CM

## 2023-10-23 DIAGNOSIS — M47.817 SPONDYLOSIS OF LUMBOSACRAL REGION WITHOUT MYELOPATHY OR RADICULOPATHY: ICD-10-CM

## 2023-10-23 DIAGNOSIS — E78.00 HYPERCHOLESTEROLEMIA: ICD-10-CM

## 2023-10-23 PROCEDURE — 99213 OFFICE O/P EST LOW 20 MIN: CPT | Performed by: INTERNAL MEDICINE

## 2023-10-23 NOTE — ASSESSMENT & PLAN NOTE
Chronic lumbar degenerative disease managed with low-dose hydrocodone intermittently.   Intolerant to NSAIDs

## 2023-10-23 NOTE — PROGRESS NOTES
Name: Lavonna Koyanagi      : 1965      MRN: [id number]  Encounter Provider: Jason Moreau MD  Encounter Date: 10/23/2023   Encounter department: [facility]     1. Encounter for colorectal cancer screening  -     Ambulatory Referral to Gastroenterology    2. Chronic midline low back pain without sciatica  Assessment & Plan:  As noted above      3. Chronic, continuous use of opioids    4. Hypercholesterolemia  Assessment & Plan: We will repeat a lipid profile before deciding on initiation of statin therapy      5. Spondylosis of lumbosacral region without myelopathy or radiculopathy  Assessment & Plan:  Chronic lumbar degenerative disease managed with low-dose hydrocodone intermittently. Intolerant to NSAIDs          Depression Screening and Follow-up Plan: Patient was screened for depression during today's encounter. They screened negative with a PHQ-2 score of 0. Subjective      Presents for follow-up visit. No major issues. Chronic back pain persists but is controllable and managed with current medications. Patient is due for screening colonoscopy. Referral to gastroenterology has been placed. She does not wish to consider lung cancer screening as an ex-smoker. Otherwise, patient has no complaints or concerns. Review of Systems   Constitutional: Negative. HENT: Negative. Eyes: Negative. Respiratory: Negative. Cardiovascular: Negative. Gastrointestinal: Negative. Endocrine: Negative. Genitourinary: Negative. Musculoskeletal:  Positive for back pain (Lumbar back pain without radiation). Skin: Negative. Allergic/Immunologic: Negative. Neurological: Negative. Hematological: Negative. Psychiatric/Behavioral: Negative.          Current Outpatient Medications on File Prior to Visit   Medication Sig    Ascorbic Acid (vitamin C) 1000 MG tablet Take 1,000 mg by mouth daily Calcium-Magnesium-Vitamin D (CALCIUM MAGNESIUM PO) Take by mouth With potassium    cholecalciferol (VITAMIN D3) 1,000 units tablet Take 1,000 Units by mouth daily    HYDROcodone-acetaminophen (NORCO) 7.5-325 mg per tablet Take 1 tablet by mouth every 4 (four) hours as needed for pain Max Daily Amount: 6 tablets    multivitamin (THERAGRAN) TABS Take 1 tablet by mouth daily    Zinc Sulfate (ZINC 15 PO) Take 1 tablet by mouth in the morning       Objective     /82 (BP Location: Left arm, Patient Position: Sitting, Cuff Size: Standard)   Pulse 56   Temp 98.9 °F (37.2 °C) (Temporal)   Ht 5' 5.75" (1.67 m)   Wt 53.9 kg (118 lb 12.8 oz)   SpO2 97%   BMI 19.32 kg/m²     Physical Exam  Vitals reviewed. Constitutional:       General: She is not in acute distress. Appearance: Normal appearance. She is normal weight. She is not ill-appearing, toxic-appearing or diaphoretic. HENT:      Head: Normocephalic and atraumatic. Right Ear: External ear normal.      Left Ear: External ear normal.   Eyes:      Conjunctiva/sclera: Conjunctivae normal.      Pupils: Pupils are equal, round, and reactive to light. Cardiovascular:      Rate and Rhythm: Normal rate and regular rhythm. Heart sounds: Normal heart sounds. No murmur heard. Pulmonary:      Effort: Pulmonary effort is normal. No respiratory distress. Breath sounds: Normal breath sounds. No wheezing or rales. Abdominal:      General: Abdomen is flat. There is no distension. Musculoskeletal:         General: No swelling. Cervical back: Neck supple. No rigidity. Right lower leg: No edema. Left lower leg: No edema. Skin:     Coloration: Skin is not jaundiced. Findings: No bruising, erythema or rash. Neurological:      General: No focal deficit present. Mental Status: She is alert and oriented to person, place, and time. Mental status is at baseline.    Psychiatric:         Mood and Affect: Mood normal. Behavior: Behavior normal.       Dara Cast MD

## 2023-11-15 ENCOUNTER — PREP FOR PROCEDURE (OUTPATIENT)
Age: 58
End: 2023-11-15

## 2023-11-15 ENCOUNTER — TELEPHONE (OUTPATIENT)
Age: 58
End: 2023-11-15

## 2023-11-15 DIAGNOSIS — Z12.11 SCREENING FOR COLON CANCER: Primary | ICD-10-CM

## 2023-11-15 NOTE — TELEPHONE ENCOUNTER
11/15/23  Screened by: Dez Pate    Referring Provider     Pre- Screening: Body mass index is 19.32 kg/m². Has patient been referred for a routine screening Colonoscopy? yes  Is the patient between 43-73 years old? yes      Previous Colonoscopy no   If yes:    Date: N/A    Facility: N/A    Reason: N/A      SCHEDULING STAFF: If the patient is between 45yrs-49yrs, please advise patient to confirm benefits/coverage with their insurance company for a routine screening colonoscopy, some insurance carriers will only cover at [facility] or [facility]. If the patient is over 66years old, please schedule an office visit. Does the patient want to see a Gastroenterologist prior to their procedure OR are they having any GI symptoms? no    Has the patient been hospitalized or had abdominal surgery in the past 6 months? no    Does the patient use supplemental oxygen? no    Does the patient take Coumadin, Lovenox, Plavix, Elliquis, Xarelto, or other blood thinning medication? no    Has the patient had a stroke, cardiac event, or stent placed in the past year? no    SCHEDULING STAFF: If patient answers NO to above questions, then schedule procedure. If patient answers YES to above questions, then schedule office appointment. If patient is between 45yrs - 49yrs, please advise patient that we will have to confirm benefits & coverage with their insurance company for a routine screening colonoscopy.

## 2023-11-15 NOTE — TELEPHONE ENCOUNTER
Scheduled date of colonoscopy (as of today):2/20/2024  Physician performing colonoscopy:   Location of colonoscopy:[facility]  Bowel prep reviewed with patient: Rose Hinojosa  Instructions reviewed with patient by:Adriana Laboy  Clearances: N/A

## 2023-11-24 DIAGNOSIS — M47.817 SPONDYLOSIS OF LUMBOSACRAL REGION WITHOUT MYELOPATHY OR RADICULOPATHY: ICD-10-CM

## 2023-11-24 DIAGNOSIS — Z12.12 SCREENING FOR COLORECTAL CANCER: ICD-10-CM

## 2023-11-24 DIAGNOSIS — Z12.11 SCREENING FOR COLORECTAL CANCER: ICD-10-CM

## 2023-11-24 DIAGNOSIS — M54.50 CHRONIC MIDLINE LOW BACK PAIN WITHOUT SCIATICA: ICD-10-CM

## 2023-11-24 DIAGNOSIS — G89.29 CHRONIC MIDLINE LOW BACK PAIN WITHOUT SCIATICA: ICD-10-CM

## 2023-11-27 RX ORDER — HYDROCODONE BITARTRATE AND ACETAMINOPHEN 7.5; 325 MG/1; MG/1
1 TABLET ORAL EVERY 4 HOURS PRN
Qty: 120 TABLET | Refills: 0 | Status: SHIPPED | OUTPATIENT
Start: 2023-11-27

## 2023-11-28 ENCOUNTER — ANNUAL EXAM (OUTPATIENT)
Dept: OBGYN CLINIC | Facility: CLINIC | Age: 58
End: 2023-11-28
Payer: COMMERCIAL

## 2023-11-28 VITALS
WEIGHT: 119 LBS | BODY MASS INDEX: 20.32 KG/M2 | HEIGHT: 64 IN | SYSTOLIC BLOOD PRESSURE: 106 MMHG | DIASTOLIC BLOOD PRESSURE: 60 MMHG

## 2023-11-28 DIAGNOSIS — Z12.31 BREAST CANCER SCREENING BY MAMMOGRAM: ICD-10-CM

## 2023-11-28 DIAGNOSIS — Z01.419 ENCOUNTER FOR ANNUAL ROUTINE GYNECOLOGICAL EXAMINATION: Primary | ICD-10-CM

## 2023-11-28 PROCEDURE — S0612 ANNUAL GYNECOLOGICAL EXAMINA: HCPCS | Performed by: OBSTETRICS & GYNECOLOGY

## 2023-11-28 PROCEDURE — G0145 SCR C/V CYTO,THINLAYER,RESCR: HCPCS | Performed by: OBSTETRICS & GYNECOLOGY

## 2023-11-28 NOTE — PROGRESS NOTES
Assessment/Plan:  Pap done for cytology reflex HPV. Encouraged self breast examination as well as calcium supplementation. Continue annual mammogram.  Reviewed colon cancer screening, schedule 2024. She will continue to follow-up with primary care as scheduled. Return to office in 1 year or as needed  No problem-specific Assessment & Plan notes found for this encounter. Diagnoses and all orders for this visit:    Encounter for annual routine gynecological examination  -     Liquid-based pap, screening    Breast cancer screening by mammogram  -     Mammo screening bilateral w 3d & cad; Future    Other orders  -     Acetylcysteine ( PO); Take by mouth  -     LYCOPENE PO; Take by mouth          Subjective:      Patient ID: Mirna Goins is a 59 y.o. female. HPI      This is a very pleasant 66-year-old female P2 ( x 2, age  27, 27) presents for her GYN exam.  She went through menopause at age 44. She is never been on hormone replacement therapy. She denies any vaginal bleeding or spotting. No changes in bowel or bladder function. Patient is , has not been sexually active in over 8 years. Pap smears have been normal.    She is scheduled for screening colonoscopy 2024. She does follow-up with her family physician on a regular basis. The following portions of the patient's history were reviewed and updated as appropriate: allergies, current medications, past family history, past medical history, past social history, past surgical history, and problem list.    Review of Systems   Constitutional:  Negative for fatigue, fever and unexpected weight change. Respiratory:  Negative for cough, chest tightness, shortness of breath and wheezing. Cardiovascular: Negative. Negative for chest pain and palpitations. Gastrointestinal: Negative. Negative for abdominal distention, abdominal pain, blood in stool, constipation, diarrhea, nausea and vomiting. Genitourinary: Negative. Negative for difficulty urinating, dyspareunia, dysuria, flank pain, frequency, genital sores, hematuria, pelvic pain, urgency, vaginal bleeding, vaginal discharge and vaginal pain. Skin:  Negative for rash. Objective:      /60   Ht 5' 4" (1.626 m)   Wt 54 kg (119 lb)   BMI 20.43 kg/m²          Physical Exam  Constitutional:       Appearance: Normal appearance. She is well-developed. HENT:      Head: Normocephalic and atraumatic. Cardiovascular:      Rate and Rhythm: Normal rate and regular rhythm. Pulmonary:      Effort: Pulmonary effort is normal.      Breath sounds: Normal breath sounds. Chest:   Breasts:     Right: No inverted nipple, mass, nipple discharge, skin change or tenderness. Left: No inverted nipple, mass, nipple discharge, skin change or tenderness. Abdominal:      General: Bowel sounds are normal. There is no distension. Palpations: Abdomen is soft. Tenderness: There is no abdominal tenderness. There is no guarding or rebound. Genitourinary:     Labia:         Right: No rash, tenderness or lesion. Left: No rash, tenderness or lesion. Vagina: Normal. No signs of injury. No vaginal discharge or tenderness. Cervix: No cervical motion tenderness, discharge, friability, lesion or cervical bleeding. Uterus: Not enlarged and not tender. Adnexa:         Right: No mass, tenderness or fullness. Left: No mass, tenderness or fullness. Neurological:      Mental Status: She is alert. Psychiatric:         Behavior: Behavior normal.         External genitalia is within normal limits. The vagina is evident of slight estrogen deficiency. There is no pelvic floor prolapse.

## 2023-12-02 LAB
LAB AP GYN PRIMARY INTERPRETATION: NORMAL
Lab: NORMAL

## 2023-12-28 DIAGNOSIS — Z12.11 SCREENING FOR COLORECTAL CANCER: ICD-10-CM

## 2023-12-28 DIAGNOSIS — M54.50 CHRONIC MIDLINE LOW BACK PAIN WITHOUT SCIATICA: ICD-10-CM

## 2023-12-28 DIAGNOSIS — M47.817 SPONDYLOSIS OF LUMBOSACRAL REGION WITHOUT MYELOPATHY OR RADICULOPATHY: ICD-10-CM

## 2023-12-28 DIAGNOSIS — G89.29 CHRONIC MIDLINE LOW BACK PAIN WITHOUT SCIATICA: ICD-10-CM

## 2023-12-28 DIAGNOSIS — Z12.12 SCREENING FOR COLORECTAL CANCER: ICD-10-CM

## 2023-12-30 RX ORDER — HYDROCODONE BITARTRATE AND ACETAMINOPHEN 7.5; 325 MG/1; MG/1
1 TABLET ORAL EVERY 4 HOURS PRN
Qty: 120 TABLET | Refills: 0 | Status: SHIPPED | OUTPATIENT
Start: 2023-12-30

## 2024-01-23 ENCOUNTER — OFFICE VISIT (OUTPATIENT)
Age: 59
End: 2024-01-23
Payer: COMMERCIAL

## 2024-01-23 VITALS
HEART RATE: 53 BPM | WEIGHT: 120 LBS | DIASTOLIC BLOOD PRESSURE: 64 MMHG | OXYGEN SATURATION: 97 % | BODY MASS INDEX: 19.29 KG/M2 | HEIGHT: 66 IN | TEMPERATURE: 98.4 F | SYSTOLIC BLOOD PRESSURE: 94 MMHG

## 2024-01-23 DIAGNOSIS — M47.817 SPONDYLOSIS OF LUMBOSACRAL REGION WITHOUT MYELOPATHY OR RADICULOPATHY: ICD-10-CM

## 2024-01-23 DIAGNOSIS — Z12.11 SCREENING FOR COLORECTAL CANCER: ICD-10-CM

## 2024-01-23 DIAGNOSIS — G89.29 CHRONIC MIDLINE LOW BACK PAIN WITHOUT SCIATICA: ICD-10-CM

## 2024-01-23 DIAGNOSIS — Z12.12 SCREENING FOR COLORECTAL CANCER: ICD-10-CM

## 2024-01-23 DIAGNOSIS — M54.50 CHRONIC MIDLINE LOW BACK PAIN WITHOUT SCIATICA: ICD-10-CM

## 2024-01-23 DIAGNOSIS — E78.00 HYPERCHOLESTEROLEMIA: Primary | ICD-10-CM

## 2024-01-23 PROCEDURE — 99213 OFFICE O/P EST LOW 20 MIN: CPT | Performed by: INTERNAL MEDICINE

## 2024-01-23 RX ORDER — HYDROCODONE BITARTRATE AND ACETAMINOPHEN 7.5; 325 MG/1; MG/1
1 TABLET ORAL EVERY 4 HOURS PRN
Qty: 120 TABLET | Refills: 0 | Status: SHIPPED | OUTPATIENT
Start: 2024-01-29 | End: 2024-01-31 | Stop reason: SDUPTHER

## 2024-01-24 NOTE — ASSESSMENT & PLAN NOTE
Continues with various homeopathic formulations for arthritic pain and inflammation.  Prior x-rays disclose arthritic changes at L5-S1

## 2024-01-24 NOTE — PROGRESS NOTES
Name: Jemima Louis      : 1965      MRN: [id number]  Encounter Provider: Jarod Alvarez MD  Encounter Date: 2024   Encounter department: [facility] CARE [place]    Assessment & Plan     1. Hypercholesterolemia  Assessment & Plan:  Lipid profile has been requested    Orders:  -     CBC and differential; Future  -     Comprehensive metabolic panel; Future  -     Lipid panel; Future  -     CBC and differential  -     Comprehensive metabolic panel  -     Lipid panel    2. Screening for colorectal cancer  -     HYDROcodone-acetaminophen (NORCO) 7.5-325 mg per tablet; Take 1 tablet by mouth every 4 (four) hours as needed for pain Max Daily Amount: 6 tablets Do not start before 2024.    3. Spondylosis of lumbosacral region without myelopathy or radiculopathy  Assessment & Plan:  Continues with various homeopathic formulations for arthritic pain and inflammation.  Prior x-rays disclose arthritic changes at L5-S1    Orders:  -     HYDROcodone-acetaminophen (NORCO) 7.5-325 mg per tablet; Take 1 tablet by mouth every 4 (four) hours as needed for pain Max Daily Amount: 6 tablets Do not start before 2024.  -     CBC and differential; Future  -     Comprehensive metabolic panel; Future  -     CBC and differential  -     Comprehensive metabolic panel    4. Chronic midline low back pain without sciatica  Assessment & Plan:  Continues with the use of an inversion table, occasional use of hydrocodone/acetaminophen    Orders:  -     HYDROcodone-acetaminophen (NORCO) 7.5-325 mg per tablet; Take 1 tablet by mouth every 4 (four) hours as needed for pain Max Daily Amount: 6 tablets Do not start before 2024.  -     CBC and differential; Future  -     Comprehensive metabolic panel; Future  -     CBC and differential  -     Comprehensive metabolic panel           Subjective      The patient presents for a follow-up visit.  In general she has no new problems.   "She has some chronic lower back pain for which she utilizes some over-the-counter homeopathic remedies, the use of an inversion table, local heat and as needed hydrocodone.  She does not have any complaints of any sciatic type radiation of the pain..  She has gained a few pounds of weight she attributes to overeating.  Her BMI is still less than 20.  She has not experienced any additional episodes of postmenopausal bleeding.      Review of Systems   Constitutional: Negative.  Negative for activity change, appetite change, chills, diaphoresis, fatigue, fever and unexpected weight change (5 pound weight gain from baseline).   HENT: Negative.     Eyes: Negative.    Respiratory: Negative.     Cardiovascular: Negative.    Gastrointestinal: Negative.    Endocrine: Negative.    Genitourinary: Negative.    Musculoskeletal: Negative.    Skin: Negative.    Neurological: Negative.    Hematological: Negative.    Psychiatric/Behavioral:  The patient is not nervous/anxious.        Current Outpatient Medications on File Prior to Visit   Medication Sig    Acetylcysteine ( PO) Take by mouth    Ascorbic Acid (vitamin C) 1000 MG tablet Take 1,000 mg by mouth daily    Calcium-Magnesium-Vitamin D (CALCIUM MAGNESIUM PO) Take by mouth With potassium    cholecalciferol (VITAMIN D3) 1,000 units tablet Take 1,000 Units by mouth daily    LYCOPENE PO Take by mouth    multivitamin (THERAGRAN) TABS Take 1 tablet by mouth daily    Zinc Sulfate (ZINC 15 PO) Take 1 tablet by mouth in the morning    [DISCONTINUED] HYDROcodone-acetaminophen (NORCO) 7.5-325 mg per tablet Take 1 tablet by mouth every 4 (four) hours as needed for pain Max Daily Amount: 6 tablets       Objective     BP 94/64 (BP Location: Left arm, Patient Position: Sitting, Cuff Size: Standard)   Pulse (!) 53   Temp 98.4 °F (36.9 °C) (Temporal)   Ht 5' 5.83\" (1.672 m)   Wt 54.4 kg (120 lb)   SpO2 97%   BMI 19.47 kg/m²     Physical Exam  Vitals reviewed.   Constitutional:  "      General: She is not in acute distress.     Appearance: Normal appearance. She is normal weight. She is not ill-appearing, toxic-appearing or diaphoretic.   HENT:      Head: Normocephalic and atraumatic.      Right Ear: External ear normal.      Left Ear: External ear normal.      Nose: Nose normal.   Eyes:      Conjunctiva/sclera: Conjunctivae normal.      Pupils: Pupils are equal, round, and reactive to light.   Cardiovascular:      Rate and Rhythm: Normal rate and regular rhythm.      Heart sounds: Normal heart sounds. No murmur heard.  Pulmonary:      Effort: Pulmonary effort is normal. No respiratory distress.      Breath sounds: Normal breath sounds. No wheezing or rales.   Abdominal:      General: Abdomen is flat. There is no distension.   Musculoskeletal:         General: No swelling.      Cervical back: Neck supple. No rigidity.      Right lower leg: No edema.      Left lower leg: No edema.   Skin:     General: Skin is warm.      Coloration: Skin is not jaundiced.      Findings: No bruising, erythema or rash.   Neurological:      General: No focal deficit present.      Mental Status: She is alert and oriented to person, place, and time. Mental status is at baseline.   Psychiatric:         Mood and Affect: Mood normal.         Behavior: Behavior normal.       Jarod Alvarez MD

## 2024-01-31 DIAGNOSIS — G89.29 CHRONIC MIDLINE LOW BACK PAIN WITHOUT SCIATICA: ICD-10-CM

## 2024-01-31 DIAGNOSIS — M47.817 SPONDYLOSIS OF LUMBOSACRAL REGION WITHOUT MYELOPATHY OR RADICULOPATHY: ICD-10-CM

## 2024-01-31 DIAGNOSIS — M54.50 CHRONIC MIDLINE LOW BACK PAIN WITHOUT SCIATICA: ICD-10-CM

## 2024-01-31 DIAGNOSIS — Z12.11 SCREENING FOR COLORECTAL CANCER: ICD-10-CM

## 2024-01-31 DIAGNOSIS — Z12.12 SCREENING FOR COLORECTAL CANCER: ICD-10-CM

## 2024-01-31 RX ORDER — HYDROCODONE BITARTRATE AND ACETAMINOPHEN 7.5; 325 MG/1; MG/1
1 TABLET ORAL EVERY 4 HOURS PRN
Qty: 120 TABLET | Refills: 0 | Status: SHIPPED | OUTPATIENT
Start: 2024-01-31

## 2024-02-08 ENCOUNTER — PATIENT MESSAGE (OUTPATIENT)
Dept: GASTROENTEROLOGY | Facility: CLINIC | Age: 59
End: 2024-02-08

## 2024-02-22 ENCOUNTER — ANESTHESIA (OUTPATIENT)
Dept: GASTROENTEROLOGY | Facility: HOSPITAL | Age: 59
End: 2024-02-22

## 2024-02-22 ENCOUNTER — ANESTHESIA EVENT (OUTPATIENT)
Dept: GASTROENTEROLOGY | Facility: HOSPITAL | Age: 59
End: 2024-02-22

## 2024-02-22 ENCOUNTER — HOSPITAL ENCOUNTER (OUTPATIENT)
Dept: GASTROENTEROLOGY | Facility: HOSPITAL | Age: 59
Setting detail: OUTPATIENT SURGERY
End: 2024-02-22
Attending: INTERNAL MEDICINE
Payer: COMMERCIAL

## 2024-02-22 VITALS
SYSTOLIC BLOOD PRESSURE: 124 MMHG | DIASTOLIC BLOOD PRESSURE: 63 MMHG | TEMPERATURE: 96 F | RESPIRATION RATE: 18 BRPM | BODY MASS INDEX: 19.16 KG/M2 | WEIGHT: 115 LBS | HEART RATE: 57 BPM | OXYGEN SATURATION: 100 % | HEIGHT: 65 IN

## 2024-02-22 DIAGNOSIS — Z12.11 SCREENING FOR COLON CANCER: ICD-10-CM

## 2024-02-22 PROCEDURE — G0121 COLON CA SCRN NOT HI RSK IND: HCPCS | Performed by: INTERNAL MEDICINE

## 2024-02-22 RX ORDER — PROPOFOL 10 MG/ML
INJECTION, EMULSION INTRAVENOUS AS NEEDED
Status: DISCONTINUED | OUTPATIENT
Start: 2024-02-22 | End: 2024-02-22

## 2024-02-22 RX ORDER — SODIUM CHLORIDE 9 MG/ML
INJECTION, SOLUTION INTRAVENOUS CONTINUOUS PRN
Status: DISCONTINUED | OUTPATIENT
Start: 2024-02-22 | End: 2024-02-22

## 2024-02-22 RX ORDER — SODIUM CHLORIDE, SODIUM LACTATE, POTASSIUM CHLORIDE, CALCIUM CHLORIDE 600; 310; 30; 20 MG/100ML; MG/100ML; MG/100ML; MG/100ML
100 INJECTION, SOLUTION INTRAVENOUS CONTINUOUS
Status: DISCONTINUED | OUTPATIENT
Start: 2024-02-22 | End: 2024-02-26 | Stop reason: HOSPADM

## 2024-02-22 RX ADMIN — PROPOFOL 70 MG: 10 INJECTION, EMULSION INTRAVENOUS at 07:42

## 2024-02-22 RX ADMIN — PROPOFOL 70 MG: 10 INJECTION, EMULSION INTRAVENOUS at 07:44

## 2024-02-22 RX ADMIN — SODIUM CHLORIDE: 9 INJECTION, SOLUTION INTRAVENOUS at 07:44

## 2024-02-22 RX ADMIN — PROPOFOL 130 MCG/KG/MIN: 10 INJECTION, EMULSION INTRAVENOUS at 07:43

## 2024-02-22 NOTE — H&P
History and Physical - [facility]  Jemima Louis 58 y.o. female MRN: [id number]                  HPI: Jemima Louis is a 58 y.o. year old female who presents for colonoscopy      REVIEW OF SYSTEMS: Per the HPI, and otherwise unremarkable.    Historical Information   Past Medical History:   Diagnosis Date    Allergic     mild - meds usually not req.    Arthritis 2009    Carrier of disease     Carrier or suspected carrier of other venereal diseases: Hepatitis B (Previous exposure, cannot donate blood)    Chronic low back pain     Environmental allergies     Eustachian tube dysfunction     Foreign body in skin of finger 11/10/2022    Hordeolum 2021    Infectious viral hepatitis     Never knew-no symp. type B    Pneumonia 2006    somewhere around there    Rotator cuff tendinitis     Urinary tract infection      Past Surgical History:   Procedure Laterality Date    DENTAL SURGERY      Impacted wisdom teeth    TONSILECTOMY AND ADNOIDECTOMY      TUBAL LIGATION Bilateral      Social History   Social History     Substance and Sexual Activity   Alcohol Use Yes    Comment: socially     Social History     Substance and Sexual Activity   Drug Use No     Social History     Tobacco Use   Smoking Status Former    Current packs/day: 0.00    Average packs/day: 1.5 packs/day for 46.0 years (69.0 ttl pk-yrs)    Types: Cigarettes    Start date:     Quit date:     Years since quittin.1   Smokeless Tobacco Never   Tobacco Comments    vapes flavored juice with nicotine     Family History   Problem Relation Age of Onset    Diabetes Mother     Hypertension Mother         Benign Essential    Stroke Mother     Hypertension Father         Benign Essential    No Known Problems Sister     No Known Problems Paternal Aunt     Stomach cancer Maternal Grandmother     Diabetes Maternal Grandmother         mild-pill form    Cancer Maternal Grandmother         Stomach    Other Maternal Grandfather   "       Cardiac disorder    Arthritis Paternal Grandmother     No Known Problems Paternal Grandfather     No Known Problems Daughter     Other Other         Malignant neoplasm of stomach    Diabetes type II Other        Meds/Allergies       Current Outpatient Medications:     Ascorbic Acid (vitamin C) 1000 MG tablet    Calcium-Magnesium-Vitamin D (CALCIUM MAGNESIUM PO)    cholecalciferol (VITAMIN D3) 1,000 units tablet    HYDROcodone-acetaminophen (NORCO) 7.5-325 mg per tablet    LYCOPENE PO    multivitamin (THERAGRAN) TABS    Zinc Sulfate (ZINC 15 PO)    Acetylcysteine ( PO)    Current Facility-Administered Medications:     lactated ringers infusion, 100 mL/hr, Intravenous, Continuous    Allergies   Allergen Reactions    Codeine Nausea Only and Vomiting       Objective     /86   Pulse 66   Temp (!) 97 °F (36.1 °C) (Tympanic)   Resp 20   Ht 5' 5\" (1.651 m)   Wt 52.2 kg (115 lb)   SpO2 99%   BMI 19.14 kg/m²       PHYSICAL EXAM    Gen: NAD  Head: NCAT  CV: RRR  CHEST: Clear  ABD: soft, NT/ND  EXT: no edema      ASSESSMENT/PLAN:  This is a 58 y.o. year old female here for colonoscopy, and she is stable and optimized for her procedure.       "

## 2024-02-22 NOTE — ANESTHESIA PREPROCEDURE EVALUATION
Procedure:  COLONOSCOPY    Relevant Problems   CARDIO   (+) Hypercholesterolemia      MUSCULOSKELETAL   (+) Arthritis, degenerative   (+) Chronic low back pain      NEURO/PSYCH   (+) Chronic low back pain        Physical Exam    Airway    Mallampati score: II  TM Distance: >3 FB  Neck ROM: full     Dental       Cardiovascular  Rate: normal    Pulmonary  Pulmonary exam normal     Other Findings  post-pubertal.      Anesthesia Plan  ASA Score- 2     Anesthesia Type- IV sedation with anesthesia with ASA Monitors.         Additional Monitors:     Airway Plan:            Plan Factors-Exercise tolerance (METS): >4 METS.    Chart reviewed.    Patient summary reviewed.    Patient is not a current smoker.              Induction- intravenous.    Postoperative Plan-     Informed Consent- Anesthetic plan and risks discussed with patient.  I personally reviewed this patient with the CRNA. Discussed and agreed on the Anesthesia Plan with the CRNA..

## 2024-02-22 NOTE — ANESTHESIA POSTPROCEDURE EVALUATION
Post-Op Assessment Note    CV Status:  Stable  Pain Score: 0    Pain management: adequate       Mental Status:  Alert and awake   Hydration Status:  Euvolemic   PONV Controlled:  Controlled   Airway Patency:  Patent  Two or more mitigation strategies used for obstructive sleep apnea   Post Op Vitals Reviewed: Yes    No anethesia notable event occurred.    Staff: CLAUDIA               /75 (02/22/24 0820)    Temp (!) 96 °F (35.6 °C) (02/22/24 0820)    Pulse 63 (02/22/24 0820)   Resp 18 (02/22/24 0820)    SpO2 100 % (02/22/24 0820)

## 2024-03-08 DIAGNOSIS — Z12.12 SCREENING FOR COLORECTAL CANCER: ICD-10-CM

## 2024-03-08 DIAGNOSIS — G89.29 CHRONIC MIDLINE LOW BACK PAIN WITHOUT SCIATICA: ICD-10-CM

## 2024-03-08 DIAGNOSIS — M47.817 SPONDYLOSIS OF LUMBOSACRAL REGION WITHOUT MYELOPATHY OR RADICULOPATHY: ICD-10-CM

## 2024-03-08 DIAGNOSIS — M54.50 CHRONIC MIDLINE LOW BACK PAIN WITHOUT SCIATICA: ICD-10-CM

## 2024-03-08 DIAGNOSIS — Z12.11 SCREENING FOR COLORECTAL CANCER: ICD-10-CM

## 2024-03-08 RX ORDER — HYDROCODONE BITARTRATE AND ACETAMINOPHEN 7.5; 325 MG/1; MG/1
1 TABLET ORAL EVERY 4 HOURS PRN
Qty: 120 TABLET | Refills: 0 | Status: SHIPPED | OUTPATIENT
Start: 2024-03-08

## 2024-04-01 NOTE — TELEPHONE ENCOUNTER
In an effort to ensure that our patients [employer], a Team Member has reviewed your chart and identified an opportunity to provide the best care possible. An attempt was made to discuss or schedule overdue Preventive or Disease Management screening.     The Outcome was Contact was not made, no answer/busyIf you have any questions or need help with scheduling, contact your primary care provider.. Care Gaps include Cervical Cancer Screening.     Last ov 6/10/19  Next ov not scheduled    PDMP - Hydrocodone #120 last refilled on 8/9/19

## 2024-04-04 ENCOUNTER — OFFICE VISIT (OUTPATIENT)
Age: 59
End: 2024-04-04
Payer: COMMERCIAL

## 2024-04-04 VITALS
TEMPERATURE: 99.3 F | HEART RATE: 58 BPM | SYSTOLIC BLOOD PRESSURE: 102 MMHG | HEIGHT: 65 IN | WEIGHT: 118.4 LBS | OXYGEN SATURATION: 95 % | BODY MASS INDEX: 19.73 KG/M2 | DIASTOLIC BLOOD PRESSURE: 64 MMHG

## 2024-04-04 DIAGNOSIS — G89.29 CHRONIC MIDLINE LOW BACK PAIN WITHOUT SCIATICA: Primary | ICD-10-CM

## 2024-04-04 DIAGNOSIS — Z12.11 SCREENING FOR COLORECTAL CANCER: ICD-10-CM

## 2024-04-04 DIAGNOSIS — F11.90 CHRONIC, CONTINUOUS USE OF OPIOIDS: ICD-10-CM

## 2024-04-04 DIAGNOSIS — Z12.12 SCREENING FOR COLORECTAL CANCER: ICD-10-CM

## 2024-04-04 DIAGNOSIS — M54.50 CHRONIC MIDLINE LOW BACK PAIN WITHOUT SCIATICA: Primary | ICD-10-CM

## 2024-04-04 DIAGNOSIS — M47.817 SPONDYLOSIS OF LUMBOSACRAL REGION WITHOUT MYELOPATHY OR RADICULOPATHY: ICD-10-CM

## 2024-04-04 DIAGNOSIS — E78.00 HYPERCHOLESTEROLEMIA: ICD-10-CM

## 2024-04-04 PROCEDURE — 99213 OFFICE O/P EST LOW 20 MIN: CPT | Performed by: INTERNAL MEDICINE

## 2024-04-04 RX ORDER — HYDROCODONE BITARTRATE AND ACETAMINOPHEN 7.5; 325 MG/1; MG/1
1 TABLET ORAL EVERY 4 HOURS PRN
Qty: 120 TABLET | Refills: 0 | Status: SHIPPED | OUTPATIENT
Start: 2024-04-04

## 2024-04-04 NOTE — PROGRESS NOTES
Name: Jemima Louis      : 1965      MRN: [id number]  Encounter Provider: Jarod Alvarez MD  Encounter Date: 2024   Encounter department: [facility] CARE [place]    Assessment & Plan     1. Chronic midline low back pain without sciatica    2. Spondylosis of lumbosacral region without myelopathy or radiculopathy    3. Hypercholesterolemia           Subjective      Ends for follow-up visit.  Persistent chronic low back pain without sciatica.  X-rays are consistent with degenerative changes.  Her current dosing of hydrocodone along with over-the-counter homeopathic products is usually sufficient to maintain good control and good functional capacity despite her ongoing back pain.  She has not had any labs drawn in quite some time and will be given prescriptions to her labs.  She denies any major issues or complaints.      Review of Systems   Constitutional: Negative.  Negative for activity change, appetite change, chills, diaphoresis, fatigue, fever and unexpected weight change.   HENT: Negative.     Eyes: Negative.    Respiratory: Negative.     Cardiovascular: Negative.    Gastrointestinal: Negative.    Endocrine: Negative.    Genitourinary: Negative.    Musculoskeletal:  Positive for back pain.   Skin: Negative.    Neurological: Negative.    Hematological: Negative.    Psychiatric/Behavioral:  The patient is not nervous/anxious.        Current Outpatient Medications on File Prior to Visit   Medication Sig    Acetylcysteine ( PO) Take 1 capsule by mouth in the morning    Ascorbic Acid (vitamin C) 1000 MG tablet Take 1,000 mg by mouth daily    Calcium-Magnesium-Vitamin D (CALCIUM MAGNESIUM PO) Take by mouth With potassium    cholecalciferol (VITAMIN D3) 1,000 units tablet Take 1,000 Units by mouth daily    HYDROcodone-acetaminophen (NORCO) 7.5-325 mg per tablet Take 1 tablet by mouth every 4 (four) hours as needed for pain Max Daily Amount: 6 tablets    LYCOPENE PO  "Take by mouth    multivitamin (THERAGRAN) TABS Take 1 tablet by mouth daily    Zinc Sulfate (ZINC 15 PO) Take 1 tablet by mouth in the morning       Objective     /64 (BP Location: Left arm, Patient Position: Sitting, Cuff Size: Standard)   Pulse 58   Temp 99.3 °F (37.4 °C) (Temporal)   Ht 5' 4.57\" (1.64 m)   Wt 53.7 kg (118 lb 6.4 oz)   SpO2 95%   BMI 19.97 kg/m²     Physical Exam  Vitals reviewed.   Constitutional:       General: She is not in acute distress.     Appearance: Normal appearance. She is normal weight. She is not ill-appearing, toxic-appearing or diaphoretic.   HENT:      Head: Normocephalic and atraumatic.      Nose: Nose normal.      Mouth/Throat:      Mouth: Mucous membranes are moist.   Eyes:      General: No scleral icterus.     Conjunctiva/sclera: Conjunctivae normal.      Pupils: Pupils are equal, round, and reactive to light.   Cardiovascular:      Rate and Rhythm: Normal rate.   Pulmonary:      Effort: Pulmonary effort is normal. No respiratory distress.   Abdominal:      General: Abdomen is flat. There is no distension.   Musculoskeletal:      Cervical back: Neck supple.      Right lower leg: No edema.      Left lower leg: No edema.   Skin:     General: Skin is warm.      Coloration: Skin is not jaundiced.      Findings: No erythema or rash.   Neurological:      General: No focal deficit present.      Mental Status: She is alert and oriented to person, place, and time. Mental status is at baseline.   Psychiatric:         Mood and Affect: Mood normal.         Behavior: Behavior normal.       Jarod Alvarez MD    "

## 2024-04-05 NOTE — ASSESSMENT & PLAN NOTE
Patient's dosage has been stable without elevation she does not take her medication 4 times a day as prescribed some days she only takes it once or twice daily when her back pain is not as severe

## 2024-04-05 NOTE — ASSESSMENT & PLAN NOTE
Intermittent but regular use of low strength opioids along with homeopathic anti-inflammatory medications

## 2024-05-03 DIAGNOSIS — M47.817 SPONDYLOSIS OF LUMBOSACRAL REGION WITHOUT MYELOPATHY OR RADICULOPATHY: ICD-10-CM

## 2024-05-03 DIAGNOSIS — M54.50 CHRONIC MIDLINE LOW BACK PAIN WITHOUT SCIATICA: ICD-10-CM

## 2024-05-03 DIAGNOSIS — Z12.11 SCREENING FOR COLORECTAL CANCER: ICD-10-CM

## 2024-05-03 DIAGNOSIS — G89.29 CHRONIC MIDLINE LOW BACK PAIN WITHOUT SCIATICA: ICD-10-CM

## 2024-05-03 DIAGNOSIS — Z12.12 SCREENING FOR COLORECTAL CANCER: ICD-10-CM

## 2024-05-06 RX ORDER — HYDROCODONE BITARTRATE AND ACETAMINOPHEN 7.5; 325 MG/1; MG/1
1 TABLET ORAL EVERY 4 HOURS PRN
Qty: 120 TABLET | Refills: 0 | Status: SHIPPED | OUTPATIENT
Start: 2024-05-06

## 2024-06-07 DIAGNOSIS — Z12.12 SCREENING FOR COLORECTAL CANCER: ICD-10-CM

## 2024-06-07 DIAGNOSIS — G89.29 CHRONIC MIDLINE LOW BACK PAIN WITHOUT SCIATICA: ICD-10-CM

## 2024-06-07 DIAGNOSIS — Z12.11 SCREENING FOR COLORECTAL CANCER: ICD-10-CM

## 2024-06-07 DIAGNOSIS — M47.817 SPONDYLOSIS OF LUMBOSACRAL REGION WITHOUT MYELOPATHY OR RADICULOPATHY: ICD-10-CM

## 2024-06-07 DIAGNOSIS — M54.50 CHRONIC MIDLINE LOW BACK PAIN WITHOUT SCIATICA: ICD-10-CM

## 2024-06-07 NOTE — TELEPHONE ENCOUNTER
Reason for call:   [x] Refill   [] Prior Auth  [] Other:     Office:   [x] PCP/Provider -  SASCHA Bradley   [] Specialty/Provider -     Medication: HYDROcodone-acetaminophen (NORCO) 7.5-325 mg per tablet     Dose/Frequency: 1 tablet, Oral, Every 4 hours PRN     Quantity: 120    Pharmacy: [facility] [address] [place] [address]     Does the patient have enough for 3 days?   [x] Yes   [] No - Send as HP to POD

## 2024-06-08 RX ORDER — HYDROCODONE BITARTRATE AND ACETAMINOPHEN 7.5; 325 MG/1; MG/1
1 TABLET ORAL EVERY 4 HOURS PRN
Qty: 120 TABLET | Refills: 0 | Status: SHIPPED | OUTPATIENT
Start: 2024-06-08

## 2024-07-10 DIAGNOSIS — G89.29 CHRONIC MIDLINE LOW BACK PAIN WITHOUT SCIATICA: ICD-10-CM

## 2024-07-10 DIAGNOSIS — Z12.11 SCREENING FOR COLORECTAL CANCER: ICD-10-CM

## 2024-07-10 DIAGNOSIS — Z12.12 SCREENING FOR COLORECTAL CANCER: ICD-10-CM

## 2024-07-10 DIAGNOSIS — M54.50 CHRONIC MIDLINE LOW BACK PAIN WITHOUT SCIATICA: ICD-10-CM

## 2024-07-10 DIAGNOSIS — M47.817 SPONDYLOSIS OF LUMBOSACRAL REGION WITHOUT MYELOPATHY OR RADICULOPATHY: ICD-10-CM

## 2024-07-10 RX ORDER — HYDROCODONE BITARTRATE AND ACETAMINOPHEN 7.5; 325 MG/1; MG/1
1 TABLET ORAL EVERY 4 HOURS PRN
Qty: 120 TABLET | Refills: 0 | Status: SHIPPED | OUTPATIENT
Start: 2024-07-10

## 2024-07-10 NOTE — TELEPHONE ENCOUNTER
Reason for call:   [x] Refill   [] Prior Auth  [] Other:     Office:   [x] PCP/Provider -   [] Specialty/Provider -     Medication:   Hydrocodone-acetaminophen 7.5-325 mg- take 1 tablet by mouth every 4 hours as needed      Pharmacy: [facility] [address] [place]    Does the patient have enough for 3 days?   [x] Yes   [] No - Send as HP to POD

## 2024-07-11 ENCOUNTER — TELEPHONE (OUTPATIENT)
Age: 59
End: 2024-07-11

## 2024-07-11 NOTE — TELEPHONE ENCOUNTER
Lucila from [facility] calling regarding the script for Hydrocodone-acetaminophen. She wanted to double check when the last office visit was and the next upcoming visit.

## 2024-07-25 ENCOUNTER — OFFICE VISIT (OUTPATIENT)
Age: 59
End: 2024-07-25
Payer: COMMERCIAL

## 2024-07-25 VITALS
SYSTOLIC BLOOD PRESSURE: 130 MMHG | BODY MASS INDEX: 20.14 KG/M2 | HEIGHT: 64 IN | TEMPERATURE: 98 F | WEIGHT: 118 LBS | OXYGEN SATURATION: 99 % | HEART RATE: 72 BPM | DIASTOLIC BLOOD PRESSURE: 80 MMHG

## 2024-07-25 DIAGNOSIS — M72.0 PALMAR FASCIAL FIBROMATOSIS (DUPUYTREN): ICD-10-CM

## 2024-07-25 DIAGNOSIS — M54.50 CHRONIC MIDLINE LOW BACK PAIN WITHOUT SCIATICA: ICD-10-CM

## 2024-07-25 DIAGNOSIS — M47.817 SPONDYLOSIS OF LUMBOSACRAL REGION WITHOUT MYELOPATHY OR RADICULOPATHY: ICD-10-CM

## 2024-07-25 DIAGNOSIS — E78.00 HYPERCHOLESTEROLEMIA: ICD-10-CM

## 2024-07-25 DIAGNOSIS — G89.29 CHRONIC MIDLINE LOW BACK PAIN WITHOUT SCIATICA: ICD-10-CM

## 2024-07-25 DIAGNOSIS — Z12.2 SCREENING FOR LUNG CANCER: Primary | ICD-10-CM

## 2024-07-25 PROCEDURE — 99213 OFFICE O/P EST LOW 20 MIN: CPT | Performed by: INTERNAL MEDICINE

## 2024-07-26 NOTE — PROGRESS NOTES
"Ambulatory Visit  Name: Jemima Louis      : 1965      MRN: [id number]  Encounter Provider: Jarod Alvarez MD  Encounter Date: 2024   Encounter department: [facility] CARE [place]    Assessment & Plan   1. Screening for lung cancer  2. Chronic midline low back pain without sciatica  3. Hypercholesterolemia  4. Spondylosis of lumbosacral region without myelopathy or radiculopathy  5. Palmar fascial fibromatosis (dupuytren)  -     Ambulatory Referral to Orthopedic Surgery; Future       History of Present Illness     Patient presents for a f/u visit. Continues to experience chronic low back pain. She does not wish to have lung cancer screening if there is any involved cost. Her most recent lipid panel was borderline  regarding need for statin therapy. Otherwise, she has no major problems or complaints.         Review of Systems   Constitutional: Negative.    HENT: Negative.     Eyes: Negative.    Respiratory: Negative.     Cardiovascular: Negative.    Gastrointestinal: Negative.    Endocrine: Negative.    Genitourinary: Negative.    Musculoskeletal:  Positive for back pain.   Skin: Negative.    Allergic/Immunologic: Negative.    Neurological: Negative.    Hematological: Negative.    Psychiatric/Behavioral: Negative.         Objective     /80 (BP Location: Left arm, Patient Position: Sitting, Cuff Size: Adult)   Pulse 72   Temp 98 °F (36.7 °C) (Temporal)   Ht 5' 4\" (1.626 m)   Wt 53.5 kg (118 lb)   SpO2 99%   BMI 20.25 kg/m²     Physical Exam  Vitals reviewed.   Constitutional:       General: She is not in acute distress.     Appearance: Normal appearance. She is normal weight. She is not ill-appearing, toxic-appearing or diaphoretic.   HENT:      Head: Normocephalic and atraumatic.      Right Ear: External ear normal.      Left Ear: External ear normal.      Nose: Nose normal.      Mouth/Throat:      Mouth: Mucous membranes are moist.   Eyes:      General: No " scleral icterus.     Conjunctiva/sclera: Conjunctivae normal.      Pupils: Pupils are equal, round, and reactive to light.   Neck:      Vascular: No carotid bruit.   Cardiovascular:      Rate and Rhythm: Normal rate and regular rhythm.      Heart sounds: Normal heart sounds. No murmur heard.  Pulmonary:      Effort: Pulmonary effort is normal. No respiratory distress.      Breath sounds: Normal breath sounds.   Abdominal:      General: Abdomen is flat. There is no distension.      Tenderness: There is no abdominal tenderness.   Musculoskeletal:         General: No swelling.      Cervical back: Normal range of motion. No rigidity or tenderness.      Right lower leg: No edema.      Left lower leg: No edema.   Skin:     General: Skin is warm.      Coloration: Skin is not jaundiced.      Findings: No bruising, erythema or rash.   Neurological:      General: No focal deficit present.      Mental Status: She is alert and oriented to person, place, and time. Mental status is at baseline.   Psychiatric:         Mood and Affect: Mood normal.         Behavior: Behavior normal.         Thought Content: Thought content normal.         Judgment: Judgment normal.       Administrative Statements

## 2024-08-08 DIAGNOSIS — M54.50 CHRONIC MIDLINE LOW BACK PAIN WITHOUT SCIATICA: ICD-10-CM

## 2024-08-08 DIAGNOSIS — Z12.11 SCREENING FOR COLORECTAL CANCER: ICD-10-CM

## 2024-08-08 DIAGNOSIS — G89.29 CHRONIC MIDLINE LOW BACK PAIN WITHOUT SCIATICA: ICD-10-CM

## 2024-08-08 DIAGNOSIS — Z12.12 SCREENING FOR COLORECTAL CANCER: ICD-10-CM

## 2024-08-08 DIAGNOSIS — M47.817 SPONDYLOSIS OF LUMBOSACRAL REGION WITHOUT MYELOPATHY OR RADICULOPATHY: ICD-10-CM

## 2024-08-08 RX ORDER — HYDROCODONE BITARTRATE AND ACETAMINOPHEN 7.5; 325 MG/1; MG/1
1 TABLET ORAL EVERY 4 HOURS PRN
Qty: 120 TABLET | Refills: 0 | Status: SHIPPED | OUTPATIENT
Start: 2024-08-08

## 2024-08-08 NOTE — TELEPHONE ENCOUNTER
Reason for call:   [x] Refill   [] Prior Auth  [] Other:     Office:   [x] PCP/Provider - Jarod Alvarez MD  [] Specialty/Provider -     Medication: HYDROcodone-acetaminophen (NORCO) 7.5-325 mg per tablet     Dose/Frequency: 1 tablet, Oral, Every 4 hours PRN     Quantity: 120    Pharmacy: [address]     Does the patient have enough for 3 days?   [x] Yes   [] No - Send as HP to POD

## 2024-08-12 ENCOUNTER — OFFICE VISIT (OUTPATIENT)
Dept: OBGYN CLINIC | Facility: HOSPITAL | Age: 59
End: 2024-08-12
Attending: INTERNAL MEDICINE
Payer: COMMERCIAL

## 2024-08-12 VITALS — BODY MASS INDEX: 19.49 KG/M2 | WEIGHT: 117 LBS | HEIGHT: 65 IN

## 2024-08-12 DIAGNOSIS — M72.0 PALMAR FASCIAL FIBROMATOSIS (DUPUYTREN): ICD-10-CM

## 2024-08-12 PROCEDURE — 99243 OFF/OP CNSLTJ NEW/EST LOW 30: CPT | Performed by: SURGERY

## 2024-08-12 NOTE — PROGRESS NOTES
Gladys Victoria M.D.  Attending, Orthopaedic Surgery  Hand, Wrist, and Elbow Surgery  [facility]      ORTHOPAEDIC HAND, WRIST, AND ELBOW OFFICE  VISIT       ASSESSMENT/PLAN:      58 year old female here for her Dupuytren's cord with no contracture of the right ring finger. Currently does not have any pain. The anatomy and physiology of Dupuytren's disease were discussed with the patient today in the office.  Increased scar formation within the interval between the skin volarly and the flexor tendons dorsally can result in pit formation, nodular formation, or eventual cord formation.  These pathologic cords can cause contracture at either the metacarpophalangeal joint, proximal interphalangeal joint, or both.  As the cords progress towards the proximal interphalangeal joint, the neurovascular structures of the finger may become involved within the disease process.  While this is a genetic condition, variable penetrance occurs within family trees.  Conservative treatment options including therapy to maintain joint mobility and a tabletop test were discussed.  Other treatments include Xiaflex and possible surgical intervention.  Patient shows understanding and agrees with this plan of care.      The patient verbalized understanding of exam findings and treatment plan. We engaged in the shared decision-making process and treatment options were discussed at length with the patient. Surgical and conservative management discussed today along with risks and benefits.    Diagnoses and all orders for this visit:    Palmar fascial fibromatosis (dupuytren)  -     Ambulatory Referral to Orthopedic Surgery        Follow Up:  Return if symptoms worsen or fail to improve.    To Do Next Visit:  Re-evaluation of current issue      General Discussions:  Dupuytren's Disease:  The anatomy and physiology of Dupuytren's disease were discussed with the patient today in the office.  Increased collagen formation  (similar to scar tissue formation but without injury) within the interval between the skin volarly and the flexor tendons dorsally can result in pit formation, nodular formation, or eventual cord formation.  These pathologic cords can cause contracture at either the metacarpophalangeal joint, proximal interphalangeal joint, or both.  As the cords progress towards the proximal interphalangeal joint, the neurovascular structures of the finger may become involved within the disease process.  While this is a genetic condition with variable penetrance, repetitive micro trauma may trigger the development of cord formation and thus contracture.  Conservative treatment options including therapy to maintain joint mobility and tabletop testing were discussed.  Other treatments include Xiaflex (collagenase) injection and surgical excision of abnormal cords.       ____________________________________________________________________________________________________________________________________________      CHIEF COMPLAINT:  Chief Complaint   Patient presents with    Right Hand - Swelling     Dupuytren right hand       SUBJECTIVE:  Jemima Louis is a 58 y.o. year old RHD female who presents today for an consultation for her right hand dupuytren's referred by her PCP, Dr. Alvarez. She reports that she noticed a lump on the parnell aspect of the right ring finger in January of 2024, she noticed more occur by May. She has no pain with it.        Pain/symptom timing:  Worse during the day when active  Pain/symptom context:  Worse with activites and work  Pain/symptom modifying factors:  Rest makes better, activities make worse  Pain/symptom associated signs/symptoms: none    Prior treatment   NSAIDsNo   Injections No   Bracing/Orthotics No    Physical Therapy No     I have personally reviewed all the relevant PMH, PSH, SH, FH, Medications and allergies      PAST MEDICAL HISTORY:  Past Medical History:   Diagnosis Date     Allergic 1997    mild - meds usually not req.    Arthritis     Carrier of disease     Carrier or suspected carrier of other venereal diseases: Hepatitis B (Previous exposure, cannot donate blood)    Chronic low back pain     Environmental allergies     Eustachian tube dysfunction     Foreign body in skin of finger 11/10/2022    Hordeolum 2021    Infectious viral hepatitis     Never knew-no symp. type B    Pneumonia 2006    somewhere around there    Rotator cuff tendinitis     Urinary tract infection        PAST SURGICAL HISTORY:  Past Surgical History:   Procedure Laterality Date    DENTAL SURGERY      Impacted wisdom teeth    TONSILECTOMY AND ADNOIDECTOMY      TUBAL LIGATION Bilateral        FAMILY HISTORY:  Family History   Problem Relation Age of Onset    Diabetes Mother     Hypertension Mother         Benign Essential    Stroke Mother     Hypertension Father         Benign Essential    No Known Problems Sister     No Known Problems Paternal Aunt     Stomach cancer Maternal Grandmother     Diabetes Maternal Grandmother         mild-pill form    Cancer Maternal Grandmother         Stomach    Other Maternal Grandfather         Cardiac disorder    Arthritis Paternal Grandmother     No Known Problems Paternal Grandfather     No Known Problems Daughter     Other Other         Malignant neoplasm of stomach    Diabetes type II Other        SOCIAL HISTORY:  Social History     Tobacco Use    Smoking status: Former     Current packs/day: 0.00     Average packs/day: 1.5 packs/day for 46.0 years (69.0 ttl pk-yrs)     Types: Cigarettes     Start date:      Quit date: 2020     Years since quittin.6    Smokeless tobacco: Never    Tobacco comments:     vapes flavored juice with nicotine   Vaping Use    Vaping status: Every Day    Substances: Nicotine (3 grams of nicotine), Flavoring   Substance Use Topics    Alcohol use: Yes     Comment: socially    Drug use: No       MEDICATIONS:    Current Outpatient  Medications:     Acetylcysteine ( PO), Take 1 capsule by mouth in the morning, Disp: , Rfl:     Ascorbic Acid (vitamin C) 1000 MG tablet, Take 1,000 mg by mouth daily, Disp: , Rfl:     Calcium-Magnesium-Vitamin D (CALCIUM MAGNESIUM PO), Take by mouth With potassium, Disp: , Rfl:     cholecalciferol (VITAMIN D3) 1,000 units tablet, Take 1,000 Units by mouth daily, Disp: , Rfl:     HYDROcodone-acetaminophen (NORCO) 7.5-325 mg per tablet, Take 1 tablet by mouth every 4 (four) hours as needed for pain Max Daily Amount: 6 tablets, Disp: 120 tablet, Rfl: 0    LYCOPENE PO, Take by mouth, Disp: , Rfl:     multivitamin (THERAGRAN) TABS, Take 1 tablet by mouth daily, Disp: , Rfl:     Zinc Sulfate (ZINC 15 PO), Take 1 tablet by mouth in the morning, Disp: , Rfl:     ALLERGIES:  Allergies   Allergen Reactions    Codeine Nausea Only and Vomiting           REVIEW OF SYSTEMS:  Review of Systems   Constitutional:  Negative for chills, fever and unexpected weight change.   HENT:  Negative for hearing loss, nosebleeds and sore throat.    Eyes:  Negative for pain, redness and visual disturbance.   Respiratory:  Negative for cough, shortness of breath and wheezing.    Cardiovascular:  Negative for chest pain, palpitations and leg swelling.   Gastrointestinal:  Negative for abdominal pain and nausea.   Genitourinary:  Negative for dyspareunia, dysuria and frequency.   Skin:  Negative for rash and wound.   Neurological:  Negative for dizziness, numbness and headaches.   Psychiatric/Behavioral:  Negative for decreased concentration and suicidal ideas. The patient is not nervous/anxious.        VITALS:  There were no vitals filed for this visit.    LABS:      _____________________________________________________  PHYSICAL EXAMINATION:  General: well developed and well nourished, alert, oriented times 3, and appears comfortable  Psychiatric: Normal  HEENT: Normocephalic, Atraumatic Trachea Midline, No torticollis  Pulmonary: No  "audible wheezing or respiratory distress   Abdomen/GI: Non tender, non distended   Cardiovascular: No pitting edema, 2+ radial pulse   Skin: No masses, erythema, lacerations, fluctation, ulcerations  Neurovascular: Sensation Intact to the Median, Ulnar, Radial Nerve, Motor Intact to the Median, Ulnar, Radial Nerve, and Pulses Intact  Musculoskeletal: Normal, except as noted in detailed exam and in HPI.      MUSCULOSKELETAL EXAMINATION:    Right hand:     + Dupuytren's cord with no contracture along the right finger finger  -Table top test  Full composite fist  Opposition intact  Sensation intact to light touch distally  Brisk capillary refill     Left hand:     Small dupuytren's nodule at the left ring     ___________________________________________________  STUDIES REVIEWED:  No images warranted for today      LABS REVIEWED:    HgA1c: No results found for: \"HGBA1C\"  BMP:   Lab Results   Component Value Date    CALCIUM 9.8 02/03/2023    K 4.5 02/03/2023    CO2 30 02/03/2023     02/03/2023    BUN 15 02/03/2023    CREATININE 0.76 02/03/2023           PROCEDURES PERFORMED:  Procedures  No Procedures performed today    _____________________________________________________      Scribe Attestation      I,:  Danika Hendricks am acting as a scribe while in the presence of the attending physician.:       I,:  Gladys Victoria MD personally performed the services described in this documentation    as scribed in my presence.:                    "

## 2024-08-12 NOTE — PATIENT INSTRUCTIONS
What is Dupuytren's Disease?  Dupuytren's disease is an abnormal thickening of the tissue just beneath the skin. This thickening occurs in the palm and can extend into the fingers (see Figure 1). Firm pits, nodules, and cords may develop that can cause the fingers to bend into the palm (see Figure 2), which is a condition described as Dupuytren's contracture. Although the skin may become involved in the process, the deeper structures--such as the tendons--are not directly involved. Occasionally, the disease will cause thickening on top of the finger knuckles (knuckle pads), or lumps or cords within the soles of the feet (plantar fibromatosis).     Causes  The cause of Dupuytren's disease is unknown but may be associated with certain biochemical factors within the involved tissue. The problem is more common in men over age 40 and in people of northern  descent. There is no proven evidence that hand injuries or specific occupational exposures lead to a higher risk of developing Dupuytren's disease.    Signs and Symptoms  Symptoms of Dupuytren's disease usually include lumps and pits within the palm. The lumps are generally firm and adherent to the skin. Thick cords may develop, extending from the palm into one or more fingers, with the ring and little fingers most commonly affected. These cords may be mistaken for tendons, but they actually lie between the skin and the tendons. These cords cause bending or contractures of the fingers. In many cases, both hands are affected, although the degree of involvement may vary. The initial lumps may produce discomfort that usually resolves, but Dupuytren's disease is not typically painful. The disease may first be noticed because of difficulty placing the hand flat on an even surface, such as a tabletop (see Figure 3). As the tissue thickens and fingers are drawn into the palm, one may notice increasing difficulty with activities such as washing, wearing gloves, shaking  hands, and putting hands into pockets. Progression is unpredictable. Some individuals will have only small lumps or cords while others will develop severely bent fingers. More severe disease often occurs with an earlier age of onset.      Treatment  In mild cases, especially if hand function is not affected, only observation is needed. For more severe cases, various treatment options are available in order to straighten the finger(s). These options may include needles or open surgery. Your treating surgeon will discuss the method most appropriate for your condition based upon the stage and pattern of the disease and the joints involved. The goal of treatment is to improve finger position and thereby hand function. Despite treatment, the disease process may recur. Before treatment, your treating surgeon will discuss realistic goals, possible risks and results. Specific surgical considerations include the following:   The presence of a lump in the palm does not mean that surgery is required or that the disease will progress.   Correction of finger position is best accomplished with milder contractures or contractures that affect the base of the finger.  Complete correction sometimes cannot be attained, especially of the middle and end joints in the finger.   Skin grafts are sometimes required to cover open areas in the fingers if the skin is deficient.   The nerves that provide feeling to the fingertips are often intertwined with the cords.   Splinting and hand therapy are often required after surgery in order to maximize and maintain the improvement in finger position and function.    © 2012 American Society for [employer] of the Hand  www.handcare.org

## 2024-09-06 DIAGNOSIS — G89.29 CHRONIC MIDLINE LOW BACK PAIN WITHOUT SCIATICA: ICD-10-CM

## 2024-09-06 DIAGNOSIS — Z12.12 SCREENING FOR COLORECTAL CANCER: ICD-10-CM

## 2024-09-06 DIAGNOSIS — M47.817 SPONDYLOSIS OF LUMBOSACRAL REGION WITHOUT MYELOPATHY OR RADICULOPATHY: ICD-10-CM

## 2024-09-06 DIAGNOSIS — M54.50 CHRONIC MIDLINE LOW BACK PAIN WITHOUT SCIATICA: ICD-10-CM

## 2024-09-06 DIAGNOSIS — Z12.11 SCREENING FOR COLORECTAL CANCER: ICD-10-CM

## 2024-09-06 RX ORDER — HYDROCODONE BITARTRATE AND ACETAMINOPHEN 7.5; 325 MG/1; MG/1
1 TABLET ORAL EVERY 4 HOURS PRN
Qty: 120 TABLET | Refills: 0 | Status: SHIPPED | OUTPATIENT
Start: 2024-09-06

## 2024-10-04 DIAGNOSIS — Z12.12 SCREENING FOR COLORECTAL CANCER: ICD-10-CM

## 2024-10-04 DIAGNOSIS — M47.817 SPONDYLOSIS OF LUMBOSACRAL REGION WITHOUT MYELOPATHY OR RADICULOPATHY: ICD-10-CM

## 2024-10-04 DIAGNOSIS — M54.50 CHRONIC MIDLINE LOW BACK PAIN WITHOUT SCIATICA: ICD-10-CM

## 2024-10-04 DIAGNOSIS — G89.29 CHRONIC MIDLINE LOW BACK PAIN WITHOUT SCIATICA: ICD-10-CM

## 2024-10-04 DIAGNOSIS — Z12.11 SCREENING FOR COLORECTAL CANCER: ICD-10-CM

## 2024-10-04 RX ORDER — HYDROCODONE BITARTRATE AND ACETAMINOPHEN 7.5; 325 MG/1; MG/1
1 TABLET ORAL EVERY 4 HOURS PRN
Qty: 120 TABLET | Refills: 0 | Status: SHIPPED | OUTPATIENT
Start: 2024-10-04

## 2024-10-04 NOTE — TELEPHONE ENCOUNTER
Reason for call:   [x] Refill   [] Prior Auth  [] Other:     Office:   [x] PCP/Provider -  Jarod Alvarez MD   [] Specialty/Provider -     Medication:     HYDROcodone-acetaminophen (NORCO) 7.5-325 mg per tablet 1 tablet, Every 4 hours PRN         Pharmacy: [facility] [address]     Does the patient have enough for 3 days?   [] Yes   [x] No - Send as HP to POD

## 2024-10-28 ENCOUNTER — OFFICE VISIT (OUTPATIENT)
Age: 59
End: 2024-10-28
Payer: COMMERCIAL

## 2024-10-28 VITALS
DIASTOLIC BLOOD PRESSURE: 58 MMHG | OXYGEN SATURATION: 97 % | BODY MASS INDEX: 19.33 KG/M2 | HEIGHT: 65 IN | WEIGHT: 116 LBS | SYSTOLIC BLOOD PRESSURE: 96 MMHG | TEMPERATURE: 99.4 F | HEART RATE: 73 BPM

## 2024-10-28 DIAGNOSIS — M54.50 CHRONIC MIDLINE LOW BACK PAIN WITHOUT SCIATICA: ICD-10-CM

## 2024-10-28 DIAGNOSIS — G89.29 CHRONIC MIDLINE LOW BACK PAIN WITHOUT SCIATICA: ICD-10-CM

## 2024-10-28 DIAGNOSIS — F17.211 NICOTINE DEPENDENCE, CIGARETTES, IN REMISSION: ICD-10-CM

## 2024-10-28 DIAGNOSIS — M19.041 OSTEOARTHRITIS OF METACARPOPHALANGEAL (MCP) JOINT OF RIGHT INDEX FINGER: ICD-10-CM

## 2024-10-28 DIAGNOSIS — E78.00 HYPERCHOLESTEROLEMIA: Primary | ICD-10-CM

## 2024-10-28 DIAGNOSIS — F11.90 CHRONIC, CONTINUOUS USE OF OPIOIDS: ICD-10-CM

## 2024-10-28 PROCEDURE — 99213 OFFICE O/P EST LOW 20 MIN: CPT | Performed by: INTERNAL MEDICINE

## 2024-10-28 NOTE — ASSESSMENT & PLAN NOTE
Recommended topical anti-inflammatory medications along with over-the-counter NSAIDs such as ibuprofen or Aleve.

## 2024-10-28 NOTE — ASSESSMENT & PLAN NOTE
Opioid use has been stable and unchanged.  Patient not experiencing any significant symptoms suggestive of habituation.

## 2024-10-28 NOTE — PROGRESS NOTES
Ambulatory Visit  Name: Jemima Louis      : 1965      MRN: [id number]  Encounter Provider: Jarod Alvarez MD  Encounter Date: 10/28/2024   Encounter department: [facility] [place]    Assessment & Plan  Nicotine dependence, cigarettes, in remission  Continue to encourage abstinence.       Hypercholesterolemia  Lipid profile to be done.  Patient not currently in need of statin therapy.       Chronic, continuous use of opioids  Opioid use has been stable and unchanged.  Patient not experiencing any significant symptoms suggestive of habituation.       Chronic midline low back pain without sciatica  Current dosing of opioids and intermittent anti-inflammatory medications are sufficient to control her back pain.       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      History of Present Illness     Patient presents to the office for follow-up visit.  She continues to experience chronic low back pain.  Previous x-rays disclosed some mild facet arthropathy of L4-5 and L5-S1.  Pain has not improved any significant degree over the years.  She does have some intermittent periods of minimal back discomfort.  She does obtain some relief from intermittent but regular use of low-dose hydrocodone.  She has not had any significant dose adjustments and her usage has been very stable.  She has also been experiencing some pain in her right thumb and metacarpophalangeal joint of her index finger.  She does not wish to have any mammography studies.  No recent labs.          Review of Systems   Constitutional: Negative.    HENT: Negative.     Eyes: Negative.    Respiratory: Negative.     Cardiovascular: Negative.    Gastrointestinal: Negative.    Endocrine: Negative.    Genitourinary: Negative.    Musculoskeletal:  Positive for arthralgias (Of the first  "and second fingers of the right hand involving the metacarpophalangeal joints of the index finger) and back pain (Chronic).   Skin: Negative.    Allergic/Immunologic: Negative.    Neurological: Negative.    Psychiatric/Behavioral: Negative.             Objective     BP 96/58 (BP Location: Left arm, Patient Position: Sitting, Cuff Size: Standard)   Pulse 73   Temp 99.4 °F (37.4 °C) (Temporal)   Ht 5' 5\" (1.651 m)   Wt 52.6 kg (116 lb)   SpO2 97%   BMI 19.30 kg/m²     Physical Exam  Vitals reviewed.   Constitutional:       General: She is not in acute distress.     Appearance: Normal appearance. She is normal weight. She is not ill-appearing, toxic-appearing or diaphoretic.   HENT:      Head: Normocephalic and atraumatic.      Right Ear: External ear normal.      Left Ear: External ear normal.      Nose: Nose normal.      Mouth/Throat:      Mouth: Mucous membranes are moist.   Eyes:      General: No scleral icterus.     Conjunctiva/sclera: Conjunctivae normal.      Pupils: Pupils are equal, round, and reactive to light.   Cardiovascular:      Rate and Rhythm: Normal rate and regular rhythm.      Heart sounds: Normal heart sounds. No murmur heard.  Pulmonary:      Effort: Pulmonary effort is normal. No respiratory distress.   Abdominal:      General: Abdomen is flat. There is no distension.   Musculoskeletal:      Cervical back: Normal range of motion and neck supple. No rigidity.      Right lower leg: No edema.      Left lower leg: No edema.   Skin:     General: Skin is warm.      Coloration: Skin is not jaundiced.      Findings: No bruising, erythema or rash.   Neurological:      General: No focal deficit present.      Mental Status: She is alert and oriented to person, place, and time. Mental status is at baseline.   Psychiatric:         Mood and Affect: Mood normal.         Behavior: Behavior normal.         "

## 2024-10-28 NOTE — ASSESSMENT & PLAN NOTE
Current dosing of opioids and intermittent anti-inflammatory medications are sufficient to control her back pain.

## 2024-11-07 DIAGNOSIS — M54.50 CHRONIC MIDLINE LOW BACK PAIN WITHOUT SCIATICA: ICD-10-CM

## 2024-11-07 DIAGNOSIS — G89.29 CHRONIC MIDLINE LOW BACK PAIN WITHOUT SCIATICA: ICD-10-CM

## 2024-11-07 DIAGNOSIS — Z12.12 SCREENING FOR COLORECTAL CANCER: ICD-10-CM

## 2024-11-07 DIAGNOSIS — Z12.11 SCREENING FOR COLORECTAL CANCER: ICD-10-CM

## 2024-11-07 DIAGNOSIS — M47.817 SPONDYLOSIS OF LUMBOSACRAL REGION WITHOUT MYELOPATHY OR RADICULOPATHY: ICD-10-CM

## 2024-11-07 RX ORDER — HYDROCODONE BITARTRATE AND ACETAMINOPHEN 7.5; 325 MG/1; MG/1
1 TABLET ORAL EVERY 4 HOURS PRN
Qty: 120 TABLET | Refills: 0 | Status: SHIPPED | OUTPATIENT
Start: 2024-11-07

## 2024-11-07 NOTE — TELEPHONE ENCOUNTER
Reason for call:   [x] Refill   [] Prior Auth  [] Other:     Office:   [x] PCP/Provider - Jarod Alvarez MD   [] Specialty/Provider -     Medication: HYDROcodone-acetaminophen (NORCO) 7.5-325 mg per tablet     Dose/Frequency:  Take 1 tablet by mouth every 4 (four) hours as needed for pain     Quantity: 120 tab    Pharmacy: [address]     Does the patient have enough for 3 days?   [x] Yes   [] No - Send as HP to POD

## 2024-12-04 ENCOUNTER — ANNUAL EXAM (OUTPATIENT)
Dept: OBGYN CLINIC | Facility: CLINIC | Age: 59
End: 2024-12-04
Payer: COMMERCIAL

## 2024-12-04 VITALS
BODY MASS INDEX: 19.89 KG/M2 | SYSTOLIC BLOOD PRESSURE: 122 MMHG | WEIGHT: 119.4 LBS | DIASTOLIC BLOOD PRESSURE: 76 MMHG | HEIGHT: 65 IN

## 2024-12-04 DIAGNOSIS — Z01.419 ENCOUNTER FOR ANNUAL ROUTINE GYNECOLOGICAL EXAMINATION: Primary | ICD-10-CM

## 2024-12-04 DIAGNOSIS — Z12.31 BREAST CANCER SCREENING BY MAMMOGRAM: ICD-10-CM

## 2024-12-04 PROCEDURE — S0612 ANNUAL GYNECOLOGICAL EXAMINA: HCPCS | Performed by: OBSTETRICS & GYNECOLOGY

## 2024-12-04 NOTE — PROGRESS NOTES
Name: Jemima Louis      : 1965      MRN: [id number]  Encounter Provider: Ronna Mcpherson DO  Encounter Date: 2024   Encounter department: [facility]  :  Assessment & Plan  Encounter for annual routine gynecological examination         Breast cancer screening by mammogram    Orders:    Mammo screening bilateral w 3d and cad; Future    Pap smear deferred due to low risk status.  Encouraged self breast examination as well as calcium supplementation.  Recommend annual mammogram.  She prefers to get mammograms every 5 years.  Reviewed colon cancer screening, up to date.  She will continue to follow-up with primary care as scheduled.  Return to office in 1 year or as needed    History of Present Illness     HPI  Jemima Louis is a 59 y.o. female who presents     This is a pleasant 59-year-old female P2 ( x 2, age 33, 27) presents for her GYN exam.  She went through menopause at age 49.  She has never been on hormone replacement therapy.  She continues to get hot flashes and night sweats.  There is been no changes in bowel or bladder function.  Patient is  has not been sexually active in over 10 years.  Pap smears have been normal.  Last Pap .  There is been no vaginal discharge.  No vaginal bleeding.    She does follow-up with her family physician on a regular basis.    Colonoscopy 2023 f/u 10 yrs    Mammogram 2021    Pap     History obtained from: patient    Review of Systems   Constitutional:  Negative for fatigue, fever and unexpected weight change.   Respiratory:  Negative for cough, chest tightness, shortness of breath and wheezing.    Cardiovascular: Negative.  Negative for chest pain and palpitations.   Gastrointestinal: Negative.  Negative for abdominal distention, abdominal pain, blood in stool, constipation, diarrhea, nausea and vomiting.   Genitourinary: Negative.  Negative for difficulty urinating, dyspareunia, dysuria, flank pain, frequency, genital  "sores, hematuria, pelvic pain, urgency, vaginal bleeding, vaginal discharge and vaginal pain.   Skin:  Negative for rash.     Current Outpatient Medications on File Prior to Visit   Medication Sig Dispense Refill    Ascorbic Acid (vitamin C) 1000 MG tablet Take 1,000 mg by mouth daily      Calcium-Magnesium-Vitamin D (CALCIUM MAGNESIUM PO) Take by mouth With potassium      cholecalciferol (VITAMIN D3) 1,000 units tablet Take 1,000 Units by mouth daily      HYDROcodone-acetaminophen (NORCO) 7.5-325 mg per tablet Take 1 tablet by mouth every 4 (four) hours as needed for pain Max Daily Amount: 6 tablets 120 tablet 0    LYCOPENE PO Take by mouth      multivitamin (THERAGRAN) TABS Take 1 tablet by mouth daily      Zinc Sulfate (ZINC 15 PO) Take 1 tablet by mouth in the morning       No current facility-administered medications on file prior to visit.         Objective   /76   Ht 5' 5\" (1.651 m)   Wt 54.2 kg (119 lb 6.4 oz)   BMI 19.87 kg/m²      Physical Exam  Constitutional:       Appearance: Normal appearance. She is well-developed.   HENT:      Head: Normocephalic and atraumatic.   Cardiovascular:      Rate and Rhythm: Normal rate and regular rhythm.   Pulmonary:      Effort: Pulmonary effort is normal.      Breath sounds: Normal breath sounds.   Chest:   Breasts:     Right: No inverted nipple, mass, nipple discharge, skin change or tenderness.      Left: No inverted nipple, mass, nipple discharge, skin change or tenderness.   Abdominal:      General: Bowel sounds are normal. There is no distension.      Palpations: Abdomen is soft.      Tenderness: There is no abdominal tenderness. There is no guarding or rebound.   Genitourinary:     Labia:         Right: No rash, tenderness or lesion.         Left: No rash, tenderness or lesion.       Vagina: Normal. No signs of injury. No vaginal discharge or tenderness.      Cervix: No cervical motion tenderness, discharge, friability, lesion or cervical bleeding.      " Uterus: Not enlarged and not tender.       Adnexa:         Right: No mass, tenderness or fullness.          Left: No mass, tenderness or fullness.     Neurological:      Mental Status: She is alert.   Psychiatric:         Behavior: Behavior normal.         Administrative Statements   I have spent a total time of 25 minutes in caring for this patient on the day of the visit/encounter including Diagnostic results, Impressions, Counseling / Coordination of care, Documenting in the medical record, Reviewing / ordering tests, medicine, procedures  , and Obtaining or reviewing history  .

## 2024-12-09 DIAGNOSIS — M47.817 SPONDYLOSIS OF LUMBOSACRAL REGION WITHOUT MYELOPATHY OR RADICULOPATHY: ICD-10-CM

## 2024-12-09 DIAGNOSIS — Z12.12 SCREENING FOR COLORECTAL CANCER: ICD-10-CM

## 2024-12-09 DIAGNOSIS — M54.50 CHRONIC MIDLINE LOW BACK PAIN WITHOUT SCIATICA: ICD-10-CM

## 2024-12-09 DIAGNOSIS — Z12.11 SCREENING FOR COLORECTAL CANCER: ICD-10-CM

## 2024-12-09 DIAGNOSIS — G89.29 CHRONIC MIDLINE LOW BACK PAIN WITHOUT SCIATICA: ICD-10-CM

## 2024-12-09 NOTE — TELEPHONE ENCOUNTER
Reason for call:   [x] Refill   [] Prior Auth  [] Other:     Office:   [x] PCP/Provider -   [] Specialty/Provider -     Medication: Hydrocodone-Acetaminophen    Dose/Frequency: 7.5-325 mg    Quantity: 120 tablets    Pharmacy:   [address] [place], [place] [address] [phone]     Does the patient have enough for 3 days?   [x] Yes   [] No - Send as HP to POD

## 2024-12-11 RX ORDER — HYDROCODONE BITARTRATE AND ACETAMINOPHEN 7.5; 325 MG/1; MG/1
1 TABLET ORAL EVERY 4 HOURS PRN
Qty: 120 TABLET | Refills: 0 | Status: SHIPPED | OUTPATIENT
Start: 2024-12-11

## 2025-01-09 DIAGNOSIS — M47.817 SPONDYLOSIS OF LUMBOSACRAL REGION WITHOUT MYELOPATHY OR RADICULOPATHY: ICD-10-CM

## 2025-01-09 DIAGNOSIS — Z12.11 SCREENING FOR COLORECTAL CANCER: ICD-10-CM

## 2025-01-09 DIAGNOSIS — Z12.12 SCREENING FOR COLORECTAL CANCER: ICD-10-CM

## 2025-01-09 DIAGNOSIS — M54.50 CHRONIC MIDLINE LOW BACK PAIN WITHOUT SCIATICA: ICD-10-CM

## 2025-01-09 DIAGNOSIS — G89.29 CHRONIC MIDLINE LOW BACK PAIN WITHOUT SCIATICA: ICD-10-CM

## 2025-01-09 NOTE — TELEPHONE ENCOUNTER
Reason for call:   [x] Refill   [] Prior Auth  [] Other:     Office:   [x] PCP/Provider -   [] Specialty/Provider -     Medication: Hydrocodone-Acetaminophen     Dose/Frequency: 7.5-325 mg per tablet taken by mouth every 4 hours PRN for pain     Quantity: 120    Pharmacy: [facility] [place] [place] [phone]     Does the patient have enough for 3 days?   [x] Yes   [] No - Send as HP to POD

## 2025-01-10 RX ORDER — HYDROCODONE BITARTRATE AND ACETAMINOPHEN 7.5; 325 MG/1; MG/1
1 TABLET ORAL EVERY 4 HOURS PRN
Qty: 120 TABLET | Refills: 0 | Status: SHIPPED | OUTPATIENT
Start: 2025-01-10

## 2025-01-28 ENCOUNTER — OFFICE VISIT (OUTPATIENT)
Age: 60
End: 2025-01-28
Payer: COMMERCIAL

## 2025-01-28 VITALS
SYSTOLIC BLOOD PRESSURE: 116 MMHG | OXYGEN SATURATION: 97 % | HEIGHT: 65 IN | WEIGHT: 115.6 LBS | BODY MASS INDEX: 19.26 KG/M2 | TEMPERATURE: 99.6 F | HEART RATE: 57 BPM | DIASTOLIC BLOOD PRESSURE: 74 MMHG

## 2025-01-28 DIAGNOSIS — E78.00 HYPERCHOLESTEROLEMIA: ICD-10-CM

## 2025-01-28 DIAGNOSIS — F11.90 CHRONIC, CONTINUOUS USE OF OPIOIDS: ICD-10-CM

## 2025-01-28 DIAGNOSIS — G89.29 CHRONIC MIDLINE LOW BACK PAIN WITHOUT SCIATICA: ICD-10-CM

## 2025-01-28 DIAGNOSIS — H66.90 SUBACUTE OTITIS MEDIA, UNSPECIFIED OTITIS MEDIA TYPE: ICD-10-CM

## 2025-01-28 DIAGNOSIS — H40.9 GLAUCOMA OF LEFT EYE, UNSPECIFIED GLAUCOMA TYPE: Primary | ICD-10-CM

## 2025-01-28 DIAGNOSIS — M54.50 CHRONIC MIDLINE LOW BACK PAIN WITHOUT SCIATICA: ICD-10-CM

## 2025-01-28 PROBLEM — H40.89 OTHER SPECIFIED GLAUCOMA: Status: ACTIVE | Noted: 2025-01-28

## 2025-01-28 PROCEDURE — 99213 OFFICE O/P EST LOW 20 MIN: CPT | Performed by: INTERNAL MEDICINE

## 2025-01-28 RX ORDER — CEFUROXIME AXETIL 500 MG/1
500 TABLET ORAL EVERY 12 HOURS SCHEDULED
Qty: 28 TABLET | Refills: 0 | Status: SHIPPED | OUTPATIENT
Start: 2025-01-28 | End: 2025-02-11

## 2025-01-28 RX ORDER — FLUTICASONE PROPIONATE 50 MCG
2 SPRAY, SUSPENSION (ML) NASAL DAILY
Qty: 16 G | Refills: 1 | Status: SHIPPED | OUTPATIENT
Start: 2025-01-28

## 2025-01-28 RX ORDER — PREDNISONE 20 MG/1
20 TABLET ORAL DAILY
Qty: 14 TABLET | Refills: 0 | Status: SHIPPED | OUTPATIENT
Start: 2025-01-28 | End: 2025-02-11

## 2025-01-28 NOTE — ASSESSMENT & PLAN NOTE
Continues on hydrocodone as needed  Orders:    CBC and differential    Comprehensive metabolic panel

## 2025-01-28 NOTE — ASSESSMENT & PLAN NOTE
Follow-up lipid profile.  Orders:    CBC and differential    Comprehensive metabolic panel    Lipid panel; Future

## 2025-01-28 NOTE — ASSESSMENT & PLAN NOTE
We will initiate antibiotic therapy along with some anti-inflammatory medication with prednisone and Flonase to facilitate drainage  Orders:    cefuroxime (CEFTIN) 500 mg tablet; Take 1 tablet (500 mg total) by mouth every 12 (twelve) hours for 14 days    predniSONE 20 mg tablet; Take 1 tablet (20 mg total) by mouth daily for 14 days    fluticasone (FLONASE) 50 mcg/act nasal spray; 2 sprays into each nostril daily

## 2025-01-28 NOTE — PROGRESS NOTES
Name: Jemima Louis      : 1965      MRN: [id number]  Encounter Provider: Jarod Alvarez MD  Encounter Date: 2025   Encounter department: [facility]  :  Assessment & Plan  Glaucoma of left eye, unspecified glaucoma type  Referred to ophthalmology Dr. Belcher for follow-up  Orders:    Ambulatory Referral to Ophthalmology; Future    Subacute otitis media, unspecified otitis media type  We will initiate antibiotic therapy along with some anti-inflammatory medication with prednisone and Flonase to facilitate drainage  Orders:    cefuroxime (CEFTIN) 500 mg tablet; Take 1 tablet (500 mg total) by mouth every 12 (twelve) hours for 14 days    predniSONE 20 mg tablet; Take 1 tablet (20 mg total) by mouth daily for 14 days    fluticasone (FLONASE) 50 mcg/act nasal spray; 2 sprays into each nostril daily    Chronic midline low back pain without sciatica  Continues on hydrocodone as needed  Orders:    CBC and differential    Comprehensive metabolic panel    Chronic, continuous use of opioids  Hydrocodone chronically.       Hypercholesterolemia  Follow-up lipid profile.  Orders:    CBC and differential    Comprehensive metabolic panel    Lipid panel; Future           History of Present Illness   Patient presents to the office for follow-up visit.  In general she is doing well problems remain chronic for the past several weeks she has had issues with a right ear infection which has been present since before the holidays.  She had some relief taking some allergy medications but for the most part she complains of some intermittent hearing loss and some mild discomfort in her right ear.  She was seen by an optometrist for vision screening and was noted to have some elevated intraocular pressures in her left eye and mildly high normal in her right eye.  She has had no fevers or chills she has had an occasional sore throat but nothing that has been persistent.  Chronic back  "pain persists.  No recent labs      Review of Systems   Constitutional: Negative.    HENT:  Positive for ear pain (Right ear) and hearing loss (Right ear). Negative for congestion, ear discharge, nosebleeds, postnasal drip, rhinorrhea and sore throat.    Eyes: Negative.    Respiratory: Negative.     Cardiovascular: Negative.    Gastrointestinal: Negative.    Endocrine: Negative.    Genitourinary: Negative.    Musculoskeletal:  Positive for back pain.   Skin: Negative.    Allergic/Immunologic: Negative.    Neurological: Negative.    Hematological: Negative.    Psychiatric/Behavioral: Negative.         Objective   /74 (BP Location: Left arm, Patient Position: Sitting, Cuff Size: Standard)   Pulse 57   Temp 99.6 °F (37.6 °C) (Temporal)   Ht 5' 5.47\" (1.663 m)   Wt 52.4 kg (115 lb 9.6 oz)   SpO2 97%   BMI 18.96 kg/m²      Physical Exam  Vitals reviewed.   Constitutional:       General: She is not in acute distress.     Appearance: Normal appearance. She is normal weight. She is not ill-appearing, toxic-appearing or diaphoretic.   HENT:      Head: Normocephalic and atraumatic.      Right Ear: Ear canal and external ear normal. There is no impacted cerumen.      Left Ear: Tympanic membrane, ear canal and external ear normal. There is no impacted cerumen.      Ears:      Comments: Right eardrum appears chronically infected.  There is some irregular debris behind the right tympanic membrane.  No bulging.  No erythema     Nose: No congestion or rhinorrhea.      Mouth/Throat:      Mouth: Mucous membranes are moist.      Pharynx: Oropharynx is clear. No oropharyngeal exudate or posterior oropharyngeal erythema.   Eyes:      General: No scleral icterus.     Conjunctiva/sclera: Conjunctivae normal.      Pupils: Pupils are equal, round, and reactive to light.   Neck:      Vascular: No carotid bruit.   Cardiovascular:      Rate and Rhythm: Normal rate and regular rhythm.      Heart sounds: Normal heart sounds. No " murmur heard.  Pulmonary:      Effort: Pulmonary effort is normal. No respiratory distress.      Breath sounds: Normal breath sounds.   Abdominal:      General: Abdomen is flat. There is no distension.      Tenderness: There is no abdominal tenderness.   Musculoskeletal:         General: No swelling.      Cervical back: Normal range of motion. No rigidity or tenderness.      Right lower leg: No edema.      Left lower leg: No edema.   Skin:     General: Skin is warm.      Coloration: Skin is not jaundiced.      Findings: No bruising, erythema or rash.   Neurological:      General: No focal deficit present.      Mental Status: She is alert and oriented to person, place, and time. Mental status is at baseline.   Psychiatric:         Mood and Affect: Mood normal.         Behavior: Behavior normal.         Thought Content: Thought content normal.         Judgment: Judgment normal.

## 2025-02-07 ENCOUNTER — TELEPHONE (OUTPATIENT)
Age: 60
End: 2025-02-07

## 2025-02-07 NOTE — TELEPHONE ENCOUNTER
Patient called to request lab orders be faxed to [facility] to  [phone] (faxed 2/7/25 @9:18). She also requested for them to be printed and ready for her to . Please advise

## 2025-02-10 DIAGNOSIS — Z12.12 SCREENING FOR COLORECTAL CANCER: ICD-10-CM

## 2025-02-10 DIAGNOSIS — M47.817 SPONDYLOSIS OF LUMBOSACRAL REGION WITHOUT MYELOPATHY OR RADICULOPATHY: ICD-10-CM

## 2025-02-10 DIAGNOSIS — Z12.11 SCREENING FOR COLORECTAL CANCER: ICD-10-CM

## 2025-02-10 DIAGNOSIS — M54.50 CHRONIC MIDLINE LOW BACK PAIN WITHOUT SCIATICA: ICD-10-CM

## 2025-02-10 DIAGNOSIS — G89.29 CHRONIC MIDLINE LOW BACK PAIN WITHOUT SCIATICA: ICD-10-CM

## 2025-02-10 NOTE — TELEPHONE ENCOUNTER
Not sure if this was faxed and patient picked it up.  Will print out and put in the brown folder to  for the patient

## 2025-02-10 NOTE — TELEPHONE ENCOUNTER
Reason for call:   [x] Refill   [] Prior Auth  [] Other:     Office:   [x] PCP/Provider -   [] Specialty/Provider -     Medication: HYDROcodone-acetaminophen (NORCO) 7.5-325 mg per tablet    Dose/Frequency:  Take 1 tablet by mouth every 4 (four) hours as needed for pain     Quantity: 120 tablet    Pharmacy: [id number] - [place], [place]      Does the patient have enough for 3 days?   [x] Yes   [] No - Send as HP to POD

## 2025-02-11 RX ORDER — HYDROCODONE BITARTRATE AND ACETAMINOPHEN 7.5; 325 MG/1; MG/1
1 TABLET ORAL EVERY 4 HOURS PRN
Qty: 120 TABLET | Refills: 0 | Status: SHIPPED | OUTPATIENT
Start: 2025-02-11

## 2025-02-12 NOTE — TELEPHONE ENCOUNTER
Patient called requesting refill for hydrocodone. Patient made aware medication was refilled on 2/11 for 120 with 0 refills to [facility] pharmacy. Patient instructed to contact the pharmacy to obtain refills of medication. Patient verbalized understanding.

## 2025-03-10 DIAGNOSIS — G89.29 CHRONIC MIDLINE LOW BACK PAIN WITHOUT SCIATICA: ICD-10-CM

## 2025-03-10 DIAGNOSIS — Z12.11 SCREENING FOR COLORECTAL CANCER: ICD-10-CM

## 2025-03-10 DIAGNOSIS — M47.817 SPONDYLOSIS OF LUMBOSACRAL REGION WITHOUT MYELOPATHY OR RADICULOPATHY: ICD-10-CM

## 2025-03-10 DIAGNOSIS — M54.50 CHRONIC MIDLINE LOW BACK PAIN WITHOUT SCIATICA: ICD-10-CM

## 2025-03-10 DIAGNOSIS — Z12.12 SCREENING FOR COLORECTAL CANCER: ICD-10-CM

## 2025-03-10 NOTE — TELEPHONE ENCOUNTER
Reason for call:   [x] Refill   [] Prior Auth  [] Other:     Office:   [x] PCP/Provider -   [] Specialty/Provider -     Medication: hydrocodone     Dose/Frequency: 7.5-325 mg Q4H PRN     Quantity:     Pharmacy: [facility] [place] on file     Local Pharmacy   Does the patient have enough for 3 days?   [x] Yes   [] No - Send as HP to POD    Mail Away Pharmacy   Does the patient have enough for 10 days?   [] Yes   [] No - Send as HP to POD

## 2025-03-11 RX ORDER — HYDROCODONE BITARTRATE AND ACETAMINOPHEN 7.5; 325 MG/1; MG/1
1 TABLET ORAL EVERY 4 HOURS PRN
Qty: 120 TABLET | Refills: 0 | Status: SHIPPED | OUTPATIENT
Start: 2025-03-11

## 2025-04-10 DIAGNOSIS — M47.817 SPONDYLOSIS OF LUMBOSACRAL REGION WITHOUT MYELOPATHY OR RADICULOPATHY: ICD-10-CM

## 2025-04-10 DIAGNOSIS — Z12.12 SCREENING FOR COLORECTAL CANCER: ICD-10-CM

## 2025-04-10 DIAGNOSIS — G89.29 CHRONIC MIDLINE LOW BACK PAIN WITHOUT SCIATICA: ICD-10-CM

## 2025-04-10 DIAGNOSIS — Z12.11 SCREENING FOR COLORECTAL CANCER: ICD-10-CM

## 2025-04-10 DIAGNOSIS — M54.50 CHRONIC MIDLINE LOW BACK PAIN WITHOUT SCIATICA: ICD-10-CM

## 2025-04-10 RX ORDER — HYDROCODONE BITARTRATE AND ACETAMINOPHEN 7.5; 325 MG/1; MG/1
1 TABLET ORAL EVERY 4 HOURS PRN
Qty: 120 TABLET | Refills: 0 | Status: SHIPPED | OUTPATIENT
Start: 2025-04-10

## 2025-04-10 NOTE — TELEPHONE ENCOUNTER
Reason for call:   [x] Refill   [] Prior Auth  [] Other:     Office:   [x] PCP/Provider -   [] Specialty/Provider -     Medication:   - Hydrocodone-acetaminophen 7.5-325mg- take 1 tablet by mouth every 4 hours as needed      Pharmacy: [facility] [address] [place]    Local Pharmacy   Does the patient have enough for 3 days?   [x] Yes   [] No - Send as HP to POD    Mail Away Pharmacy   Does the patient have enough for 10 days?   [] Yes   [] No - Send as HP to POD

## 2025-05-05 NOTE — ASSESSMENT & PLAN NOTE
Continue Mucinex tablets    We will prescribe azithromycin as well as chlorpheniramine as decongestant On 5/2/25 an order to ENT was placed for the diagnosis of     Dx: Tinnitus of right ear [H93.11] .      [facility] do require a hearing test to be done prior to being seen for the diagnosis. Can the order to audiology also be placed.

## 2025-05-14 DIAGNOSIS — Z12.11 SCREENING FOR COLORECTAL CANCER: ICD-10-CM

## 2025-05-14 DIAGNOSIS — G89.29 CHRONIC MIDLINE LOW BACK PAIN WITHOUT SCIATICA: ICD-10-CM

## 2025-05-14 DIAGNOSIS — M54.50 CHRONIC MIDLINE LOW BACK PAIN WITHOUT SCIATICA: ICD-10-CM

## 2025-05-14 DIAGNOSIS — Z12.12 SCREENING FOR COLORECTAL CANCER: ICD-10-CM

## 2025-05-14 DIAGNOSIS — M47.817 SPONDYLOSIS OF LUMBOSACRAL REGION WITHOUT MYELOPATHY OR RADICULOPATHY: ICD-10-CM

## 2025-05-14 RX ORDER — HYDROCODONE BITARTRATE AND ACETAMINOPHEN 7.5; 325 MG/1; MG/1
1 TABLET ORAL EVERY 4 HOURS PRN
Qty: 120 TABLET | Refills: 0 | Status: CANCELLED | OUTPATIENT
Start: 2025-05-14

## 2025-05-14 NOTE — TELEPHONE ENCOUNTER
Reason for call:   [x] Refill   [] Prior Auth  [] Other:     Office:   [x] PCP/Provider -   [] Specialty/Provider -     Medication:   HYDROcodone-acetaminophen (NORCO) 7.5-325 mg per tablet [    Dose/Frequency: Sig: Take 1 tablet by mouth every 4 (four) hours as needed for pain Max Daily Amount: 6 tablets      Quantity: 120    Pharmacy:   [address] [address]  Phone: [phone]  Fax: [phone]    Local Pharmacy   Does the patient have enough for 3 days?   [x] Yes   [] No - Send as HP to POD    Mail Away Pharmacy   Does the patient have enough for 10 days?   [] Yes   [] No - Send as HP to POD

## 2025-05-19 RX ORDER — HYDROCODONE BITARTRATE AND ACETAMINOPHEN 7.5; 325 MG/1; MG/1
1 TABLET ORAL EVERY 4 HOURS PRN
Qty: 120 TABLET | Refills: 0 | Status: SHIPPED | OUTPATIENT
Start: 2025-05-19

## 2025-05-30 ENCOUNTER — VBI (OUTPATIENT)
Dept: ADMINISTRATIVE | Facility: OTHER | Age: 60
End: 2025-05-30

## 2025-05-30 NOTE — TELEPHONE ENCOUNTER
05/30/25 2:42 PM     Chart reviewed for Mammogram ; nothing is submitted to the patient's insurance at this time.     Divina Crisostomo MA   [facility]

## 2025-06-18 DIAGNOSIS — M54.50 CHRONIC MIDLINE LOW BACK PAIN WITHOUT SCIATICA: ICD-10-CM

## 2025-06-18 DIAGNOSIS — Z12.11 SCREENING FOR COLORECTAL CANCER: ICD-10-CM

## 2025-06-18 DIAGNOSIS — M47.817 SPONDYLOSIS OF LUMBOSACRAL REGION WITHOUT MYELOPATHY OR RADICULOPATHY: ICD-10-CM

## 2025-06-18 DIAGNOSIS — Z12.12 SCREENING FOR COLORECTAL CANCER: ICD-10-CM

## 2025-06-18 DIAGNOSIS — G89.29 CHRONIC MIDLINE LOW BACK PAIN WITHOUT SCIATICA: ICD-10-CM

## 2025-06-18 NOTE — TELEPHONE ENCOUNTER
Reason for call:   [x] Refill   [] Prior Auth  [] Other:     Office:   [x] PCP/Provider -   [] Specialty/Provider -     Medication: HYDROcodone-acetaminophen (NORCO) 7.5-325 mg per tablet- Take 1 tablet by mouth every 4 (four) hours as needed for pain       Pharmacy: [facility]- [place], [place]     Local Pharmacy   Does the patient have enough for 3 days?   [x] Yes   [] No - Send as HP to POD    Mail Away Pharmacy   Does the patient have enough for 10 days?   [] Yes   [] No - Send as HP to POD

## 2025-06-19 RX ORDER — HYDROCODONE BITARTRATE AND ACETAMINOPHEN 7.5; 325 MG/1; MG/1
1 TABLET ORAL EVERY 4 HOURS PRN
Qty: 120 TABLET | Refills: 0 | Status: SHIPPED | OUTPATIENT
Start: 2025-06-19

## 2025-07-22 ENCOUNTER — OFFICE VISIT (OUTPATIENT)
Age: 60
End: 2025-07-22
Payer: COMMERCIAL

## 2025-07-22 VITALS
SYSTOLIC BLOOD PRESSURE: 96 MMHG | TEMPERATURE: 98.9 F | BODY MASS INDEX: 19.66 KG/M2 | HEART RATE: 71 BPM | WEIGHT: 118 LBS | DIASTOLIC BLOOD PRESSURE: 58 MMHG | HEIGHT: 65 IN | OXYGEN SATURATION: 100 %

## 2025-07-22 DIAGNOSIS — G89.29 CHRONIC MIDLINE LOW BACK PAIN WITHOUT SCIATICA: ICD-10-CM

## 2025-07-22 DIAGNOSIS — F11.90 CHRONIC, CONTINUOUS USE OF OPIOIDS: ICD-10-CM

## 2025-07-22 DIAGNOSIS — Z00.00 ANNUAL PHYSICAL EXAM: Primary | ICD-10-CM

## 2025-07-22 DIAGNOSIS — Z12.12 SCREENING FOR COLORECTAL CANCER: ICD-10-CM

## 2025-07-22 DIAGNOSIS — M47.817 SPONDYLOSIS OF LUMBOSACRAL REGION WITHOUT MYELOPATHY OR RADICULOPATHY: ICD-10-CM

## 2025-07-22 DIAGNOSIS — Z12.11 SCREENING FOR COLORECTAL CANCER: ICD-10-CM

## 2025-07-22 DIAGNOSIS — M54.50 CHRONIC MIDLINE LOW BACK PAIN WITHOUT SCIATICA: ICD-10-CM

## 2025-07-22 PROCEDURE — 99396 PREV VISIT EST AGE 40-64: CPT | Performed by: INTERNAL MEDICINE

## 2025-07-22 RX ORDER — PERPHENAZINE 8 MG
1 TABLET ORAL DAILY
COMMUNITY

## 2025-07-22 RX ORDER — HYDROCODONE BITARTRATE AND ACETAMINOPHEN 7.5; 325 MG/1; MG/1
1 TABLET ORAL EVERY 4 HOURS PRN
Qty: 120 TABLET | Refills: 0 | Status: SHIPPED | OUTPATIENT
Start: 2025-07-22

## 2025-07-22 NOTE — PROGRESS NOTES
Adult Annual Physical  Name: Jemima Louis      : 1965      MRN: [id number]  Encounter Provider: Jarod Alvarez MD  Encounter Date: 2025   Encounter department: [facility] [place]    :  Assessment & Plan  Chronic midline low back pain without sciatica  This is a chronic condition which is stable.       Annual physical exam  No significant issues.  Patient is in a good general state of health.  Some minor skin issues and chronic low back pain.           Preventive Screenings:  - Diabetes Screening: risks/benefits discussed  - Cholesterol Screening: screening not indicated, has hyperlipidemia and risks/benefits discussed   - Hepatitis C screening: screening up-to-date   - HIV screening: risks/benefits discussed and patient declines   - Cervical cancer screening: screening up-to-date   - Breast cancer screening: risks/benefits discussed   - Colon cancer screening: screening up-to-date     Immunizations:  - Immunizations due: Prevnar 20 and Zoster (Shingrix)         History of Present Illness     Adult Annual Physical:  Patient presents for annual physical.     Diet and Physical Activity:  - Diet/Nutrition: well balanced diet, limited junk food, consuming 3-5 servings of fruits/vegetables daily and adequate fiber intake.  - Exercise: walking and moderate cardiovascular exercise.    Depression Screening:  - PHQ-2 Score: 0    General Health:  - Sleep: 4-6 hours of sleep on average.  - Hearing: normal hearing bilateral ears.  - Vision: most recent eye exam < 1 year ago and wears glasses.  - Dental: regular dental visits, brushes teeth once daily and floss regularly.    /GYN Health:  - Follows with GYN: yes.   - Menopause: postmenopausal.   - History of STDs: yes    Advanced Care Planning:  - Has an advanced directive?: no    - Has a durable medical POA?: no    - ACP document given to patient?: no      Review of Systems   Constitutional: Negative.    HENT: Negative.    "  Eyes: Negative.    Respiratory: Negative.     Cardiovascular: Negative.    Gastrointestinal: Negative.    Endocrine: Negative.    Genitourinary: Negative.    Musculoskeletal:  Positive for arthralgias (fingers and low back).   Skin:  Positive for rash.   Allergic/Immunologic: Negative.    Neurological: Negative.    Hematological: Negative.    Psychiatric/Behavioral: Negative.           Objective   BP 96/58 (BP Location: Left arm, Patient Position: Sitting, Cuff Size: Standard)   Pulse 71   Temp 98.9 °F (37.2 °C) (Temporal)   Ht 5' 5\" (1.651 m)   Wt 53.5 kg (118 lb)   SpO2 100%   BMI 19.64 kg/m²     Physical Exam  Vitals reviewed.   Constitutional:       General: She is not in acute distress.     Appearance: Normal appearance. She is normal weight. She is not ill-appearing, toxic-appearing or diaphoretic.   HENT:      Head: Normocephalic and atraumatic.      Right Ear: Tympanic membrane, ear canal and external ear normal.      Left Ear: Tympanic membrane and external ear normal.      Nose: Nose normal.      Mouth/Throat:      Mouth: Mucous membranes are moist.     Eyes:      General: No scleral icterus.     Conjunctiva/sclera: Conjunctivae normal.      Pupils: Pupils are equal, round, and reactive to light.       Cardiovascular:      Rate and Rhythm: Normal rate and regular rhythm.      Heart sounds: Normal heart sounds. No murmur heard.  Pulmonary:      Effort: Pulmonary effort is normal. No respiratory distress.      Breath sounds: Normal breath sounds.   Abdominal:      General: Abdomen is flat. There is no distension.     Musculoskeletal:      Cervical back: Neck supple. No rigidity.      Right lower leg: No edema.      Left lower leg: No edema.     Skin:     General: Skin is warm.      Coloration: Skin is not jaundiced.      Findings: No bruising, erythema or rash.     Neurological:      General: No focal deficit present.      Mental Status: She is alert and oriented to person, place, and time. Mental " status is at baseline.     Psychiatric:         Mood and Affect: Mood normal.         Behavior: Behavior normal.

## 2025-07-22 NOTE — ASSESSMENT & PLAN NOTE
No significant issues.  Patient is in a good general state of health.  Some minor skin issues and chronic low back pain.